# Patient Record
Sex: FEMALE | Race: BLACK OR AFRICAN AMERICAN | NOT HISPANIC OR LATINO
[De-identification: names, ages, dates, MRNs, and addresses within clinical notes are randomized per-mention and may not be internally consistent; named-entity substitution may affect disease eponyms.]

---

## 2017-06-22 ENCOUNTER — APPOINTMENT (OUTPATIENT)
Dept: UROLOGY | Facility: CLINIC | Age: 47
End: 2017-06-22

## 2017-06-22 VITALS
WEIGHT: 200 LBS | BODY MASS INDEX: 30.31 KG/M2 | HEIGHT: 68 IN | SYSTOLIC BLOOD PRESSURE: 122 MMHG | DIASTOLIC BLOOD PRESSURE: 88 MMHG | HEART RATE: 60 BPM

## 2017-06-22 DIAGNOSIS — Z78.9 OTHER SPECIFIED HEALTH STATUS: ICD-10-CM

## 2017-06-22 LAB
BILIRUB UR QL STRIP: NORMAL
CLARITY UR: CLEAR
COLLECTION METHOD: NORMAL
GLUCOSE UR-MCNC: NORMAL
HCG UR QL: 0.2 EU/DL
HGB UR QL STRIP.AUTO: NORMAL
KETONES UR-MCNC: NORMAL
LEUKOCYTE ESTERASE UR QL STRIP: NORMAL
NITRITE UR QL STRIP: NORMAL
PH UR STRIP: 7
PROT UR STRIP-MCNC: NORMAL
SP GR UR STRIP: 1.02

## 2017-06-28 ENCOUNTER — OUTPATIENT (OUTPATIENT)
Dept: OUTPATIENT SERVICES | Facility: HOSPITAL | Age: 47
LOS: 1 days | End: 2017-06-28
Payer: MEDICAID

## 2017-06-28 ENCOUNTER — APPOINTMENT (OUTPATIENT)
Dept: CT IMAGING | Facility: CLINIC | Age: 47
End: 2017-06-28

## 2017-06-28 DIAGNOSIS — Z00.8 ENCOUNTER FOR OTHER GENERAL EXAMINATION: ICD-10-CM

## 2017-06-28 PROCEDURE — 74178 CT ABD&PLV WO CNTR FLWD CNTR: CPT

## 2017-07-10 ENCOUNTER — APPOINTMENT (OUTPATIENT)
Dept: UROLOGY | Facility: CLINIC | Age: 47
End: 2017-07-10

## 2017-07-10 VITALS
HEIGHT: 68 IN | DIASTOLIC BLOOD PRESSURE: 84 MMHG | HEART RATE: 64 BPM | SYSTOLIC BLOOD PRESSURE: 131 MMHG | TEMPERATURE: 98.7 F | WEIGHT: 200 LBS | BODY MASS INDEX: 30.31 KG/M2

## 2017-07-10 DIAGNOSIS — N28.1 CYST OF KIDNEY, ACQUIRED: ICD-10-CM

## 2017-07-10 DIAGNOSIS — R10.9 UNSPECIFIED ABDOMINAL PAIN: ICD-10-CM

## 2017-08-09 ENCOUNTER — OUTPATIENT (OUTPATIENT)
Dept: OUTPATIENT SERVICES | Facility: HOSPITAL | Age: 47
LOS: 1 days | End: 2017-08-09
Payer: COMMERCIAL

## 2017-08-09 VITALS
DIASTOLIC BLOOD PRESSURE: 91 MMHG | SYSTOLIC BLOOD PRESSURE: 133 MMHG | HEIGHT: 68 IN | WEIGHT: 205.03 LBS | TEMPERATURE: 98 F | HEART RATE: 70 BPM | RESPIRATION RATE: 16 BRPM

## 2017-08-09 DIAGNOSIS — Z01.818 ENCOUNTER FOR OTHER PREPROCEDURAL EXAMINATION: ICD-10-CM

## 2017-08-09 DIAGNOSIS — J45.909 UNSPECIFIED ASTHMA, UNCOMPLICATED: ICD-10-CM

## 2017-08-09 DIAGNOSIS — D25.9 LEIOMYOMA OF UTERUS, UNSPECIFIED: ICD-10-CM

## 2017-08-09 DIAGNOSIS — D32.0 BENIGN NEOPLASM OF CEREBRAL MENINGES: Chronic | ICD-10-CM

## 2017-08-09 DIAGNOSIS — N94.89 OTHER SPECIFIED CONDITIONS ASSOCIATED WITH FEMALE GENITAL ORGANS AND MENSTRUAL CYCLE: ICD-10-CM

## 2017-08-09 DIAGNOSIS — Z98.890 OTHER SPECIFIED POSTPROCEDURAL STATES: Chronic | ICD-10-CM

## 2017-08-09 LAB
ANION GAP SERPL CALC-SCNC: 15 MMOL/L — SIGNIFICANT CHANGE UP (ref 5–17)
APTT BLD: 33.1 SEC — SIGNIFICANT CHANGE UP (ref 27.5–37.4)
BASOPHILS # BLD AUTO: 0 K/UL — SIGNIFICANT CHANGE UP (ref 0–0.2)
BASOPHILS NFR BLD AUTO: 0.8 % — SIGNIFICANT CHANGE UP (ref 0–2)
BLD GP AB SCN SERPL QL: SIGNIFICANT CHANGE UP
BUN SERPL-MCNC: 11 MG/DL — SIGNIFICANT CHANGE UP (ref 8–20)
CALCIUM SERPL-MCNC: 8.9 MG/DL — SIGNIFICANT CHANGE UP (ref 8.6–10.2)
CHLORIDE SERPL-SCNC: 103 MMOL/L — SIGNIFICANT CHANGE UP (ref 98–107)
CO2 SERPL-SCNC: 24 MMOL/L — SIGNIFICANT CHANGE UP (ref 22–29)
CREAT SERPL-MCNC: 0.74 MG/DL — SIGNIFICANT CHANGE UP (ref 0.5–1.3)
EOSINOPHIL # BLD AUTO: 0.1 K/UL — SIGNIFICANT CHANGE UP (ref 0–0.5)
EOSINOPHIL NFR BLD AUTO: 2.3 % — SIGNIFICANT CHANGE UP (ref 0–6)
GLUCOSE SERPL-MCNC: 77 MG/DL — SIGNIFICANT CHANGE UP (ref 70–115)
HCT VFR BLD CALC: 37.5 % — SIGNIFICANT CHANGE UP (ref 37–47)
HGB BLD-MCNC: 12.1 G/DL — SIGNIFICANT CHANGE UP (ref 12–16)
INR BLD: 1.03 RATIO — SIGNIFICANT CHANGE UP (ref 0.88–1.16)
LYMPHOCYTES # BLD AUTO: 1 K/UL — SIGNIFICANT CHANGE UP (ref 1–4.8)
LYMPHOCYTES # BLD AUTO: 25.3 % — SIGNIFICANT CHANGE UP (ref 20–55)
MCHC RBC-ENTMCNC: 26.1 PG — LOW (ref 27–31)
MCHC RBC-ENTMCNC: 32.3 G/DL — SIGNIFICANT CHANGE UP (ref 32–36)
MCV RBC AUTO: 80.8 FL — LOW (ref 81–99)
MONOCYTES # BLD AUTO: 0.4 K/UL — SIGNIFICANT CHANGE UP (ref 0–0.8)
MONOCYTES NFR BLD AUTO: 10 % — SIGNIFICANT CHANGE UP (ref 3–10)
NEUTROPHILS # BLD AUTO: 2.5 K/UL — SIGNIFICANT CHANGE UP (ref 1.8–8)
NEUTROPHILS NFR BLD AUTO: 61.6 % — SIGNIFICANT CHANGE UP (ref 37–73)
PLATELET # BLD AUTO: 285 K/UL — SIGNIFICANT CHANGE UP (ref 150–400)
POTASSIUM SERPL-MCNC: 3.8 MMOL/L — SIGNIFICANT CHANGE UP (ref 3.5–5.3)
POTASSIUM SERPL-SCNC: 3.8 MMOL/L — SIGNIFICANT CHANGE UP (ref 3.5–5.3)
PROTHROM AB SERPL-ACNC: 11.3 SEC — SIGNIFICANT CHANGE UP (ref 9.8–12.7)
RBC # BLD: 4.64 M/UL — SIGNIFICANT CHANGE UP (ref 4.4–5.2)
RBC # FLD: 14.4 % — SIGNIFICANT CHANGE UP (ref 11–15.6)
SODIUM SERPL-SCNC: 142 MMOL/L — SIGNIFICANT CHANGE UP (ref 135–145)
TYPE + AB SCN PNL BLD: SIGNIFICANT CHANGE UP
WBC # BLD: 4 K/UL — LOW (ref 4.8–10.8)
WBC # FLD AUTO: 4 K/UL — LOW (ref 4.8–10.8)

## 2017-08-09 PROCEDURE — G0463: CPT

## 2017-08-09 PROCEDURE — 86850 RBC ANTIBODY SCREEN: CPT

## 2017-08-09 PROCEDURE — 36415 COLL VENOUS BLD VENIPUNCTURE: CPT

## 2017-08-09 PROCEDURE — 86900 BLOOD TYPING SEROLOGIC ABO: CPT

## 2017-08-09 PROCEDURE — 85027 COMPLETE CBC AUTOMATED: CPT

## 2017-08-09 PROCEDURE — 85730 THROMBOPLASTIN TIME PARTIAL: CPT

## 2017-08-09 PROCEDURE — 86901 BLOOD TYPING SEROLOGIC RH(D): CPT

## 2017-08-09 PROCEDURE — 85610 PROTHROMBIN TIME: CPT

## 2017-08-09 PROCEDURE — 80048 BASIC METABOLIC PNL TOTAL CA: CPT

## 2017-08-09 RX ORDER — SODIUM CHLORIDE 9 MG/ML
3 INJECTION INTRAMUSCULAR; INTRAVENOUS; SUBCUTANEOUS EVERY 8 HOURS
Qty: 0 | Refills: 0 | Status: DISCONTINUED | OUTPATIENT
Start: 2017-08-22 | End: 2017-08-24

## 2017-08-09 RX ORDER — SODIUM CHLORIDE 9 MG/ML
1000 INJECTION, SOLUTION INTRAVENOUS
Qty: 0 | Refills: 0 | Status: DISCONTINUED | OUTPATIENT
Start: 2017-08-22 | End: 2017-08-24

## 2017-08-09 NOTE — H&P PST ADULT - NSANTHOSAYNRD_GEN_A_CORE
No. BEN screening performed.  STOP BANG Legend: 0-2 = LOW Risk; 3-4 = INTERMEDIATE Risk; 5-8 = HIGH Risk

## 2017-08-22 ENCOUNTER — RESULT REVIEW (OUTPATIENT)
Age: 47
End: 2017-08-22

## 2017-08-22 ENCOUNTER — TRANSCRIPTION ENCOUNTER (OUTPATIENT)
Age: 47
End: 2017-08-22

## 2017-08-22 ENCOUNTER — INPATIENT (INPATIENT)
Facility: HOSPITAL | Age: 47
LOS: 1 days | Discharge: HOME | DRG: 743 | End: 2017-08-24
Attending: OBSTETRICS & GYNECOLOGY | Admitting: OBSTETRICS & GYNECOLOGY
Payer: COMMERCIAL

## 2017-08-22 VITALS — HEIGHT: 68 IN | WEIGHT: 205.03 LBS

## 2017-08-22 DIAGNOSIS — D32.0 BENIGN NEOPLASM OF CEREBRAL MENINGES: Chronic | ICD-10-CM

## 2017-08-22 DIAGNOSIS — Z98.890 OTHER SPECIFIED POSTPROCEDURAL STATES: Chronic | ICD-10-CM

## 2017-08-22 DIAGNOSIS — D25.9 LEIOMYOMA OF UTERUS, UNSPECIFIED: ICD-10-CM

## 2017-08-22 PROCEDURE — 88307 TISSUE EXAM BY PATHOLOGIST: CPT | Mod: 26

## 2017-08-22 PROCEDURE — 88302 TISSUE EXAM BY PATHOLOGIST: CPT | Mod: 26

## 2017-08-22 RX ORDER — OXYCODONE AND ACETAMINOPHEN 5; 325 MG/1; MG/1
2 TABLET ORAL EVERY 6 HOURS
Qty: 0 | Refills: 0 | Status: DISCONTINUED | OUTPATIENT
Start: 2017-08-22 | End: 2017-08-24

## 2017-08-22 RX ORDER — MORPHINE SULFATE 50 MG/1
4 CAPSULE, EXTENDED RELEASE ORAL EVERY 4 HOURS
Qty: 0 | Refills: 0 | Status: DISCONTINUED | OUTPATIENT
Start: 2017-08-22 | End: 2017-08-24

## 2017-08-22 RX ORDER — HYDROMORPHONE HYDROCHLORIDE 2 MG/ML
0.5 INJECTION INTRAMUSCULAR; INTRAVENOUS; SUBCUTANEOUS
Qty: 0 | Refills: 0 | Status: DISCONTINUED | OUTPATIENT
Start: 2017-08-22 | End: 2017-08-22

## 2017-08-22 RX ORDER — CEFAZOLIN SODIUM 1 G
2000 VIAL (EA) INJECTION ONCE
Qty: 0 | Refills: 0 | Status: DISCONTINUED | OUTPATIENT
Start: 2017-08-22 | End: 2017-08-24

## 2017-08-22 RX ORDER — OXYCODONE AND ACETAMINOPHEN 5; 325 MG/1; MG/1
1 TABLET ORAL EVERY 4 HOURS
Qty: 0 | Refills: 0 | Status: DISCONTINUED | OUTPATIENT
Start: 2017-08-22 | End: 2017-08-24

## 2017-08-22 RX ORDER — SODIUM CHLORIDE 9 MG/ML
1000 INJECTION, SOLUTION INTRAVENOUS
Qty: 0 | Refills: 0 | Status: DISCONTINUED | OUTPATIENT
Start: 2017-08-22 | End: 2017-08-24

## 2017-08-22 RX ORDER — PREGABALIN 225 MG/1
1 CAPSULE ORAL
Qty: 0 | Refills: 0 | COMMUNITY

## 2017-08-22 RX ORDER — CEFAZOLIN SODIUM 1 G
3000 VIAL (EA) INJECTION ONCE
Qty: 0 | Refills: 0 | Status: DISCONTINUED | OUTPATIENT
Start: 2017-08-22 | End: 2017-08-22

## 2017-08-22 RX ORDER — ONDANSETRON 8 MG/1
4 TABLET, FILM COATED ORAL ONCE
Qty: 0 | Refills: 0 | Status: DISCONTINUED | OUTPATIENT
Start: 2017-08-22 | End: 2017-08-22

## 2017-08-22 RX ORDER — MORPHINE SULFATE 50 MG/1
2 CAPSULE, EXTENDED RELEASE ORAL EVERY 4 HOURS
Qty: 0 | Refills: 0 | Status: DISCONTINUED | OUTPATIENT
Start: 2017-08-22 | End: 2017-08-24

## 2017-08-22 RX ORDER — SODIUM CHLORIDE 9 MG/ML
1000 INJECTION, SOLUTION INTRAVENOUS
Qty: 0 | Refills: 0 | Status: DISCONTINUED | OUTPATIENT
Start: 2017-08-22 | End: 2017-08-22

## 2017-08-22 RX ADMIN — MORPHINE SULFATE 2 MILLIGRAM(S): 50 CAPSULE, EXTENDED RELEASE ORAL at 15:45

## 2017-08-22 RX ADMIN — SODIUM CHLORIDE 3 MILLILITER(S): 9 INJECTION INTRAMUSCULAR; INTRAVENOUS; SUBCUTANEOUS at 14:04

## 2017-08-22 RX ADMIN — SODIUM CHLORIDE 150 MILLILITER(S): 9 INJECTION, SOLUTION INTRAVENOUS at 14:05

## 2017-08-22 RX ADMIN — OXYCODONE AND ACETAMINOPHEN 2 TABLET(S): 5; 325 TABLET ORAL at 18:10

## 2017-08-22 RX ADMIN — SODIUM CHLORIDE 125 MILLILITER(S): 9 INJECTION, SOLUTION INTRAVENOUS at 15:23

## 2017-08-22 RX ADMIN — HYDROMORPHONE HYDROCHLORIDE 0.5 MILLIGRAM(S): 2 INJECTION INTRAMUSCULAR; INTRAVENOUS; SUBCUTANEOUS at 14:45

## 2017-08-22 RX ADMIN — HYDROMORPHONE HYDROCHLORIDE 0.5 MILLIGRAM(S): 2 INJECTION INTRAMUSCULAR; INTRAVENOUS; SUBCUTANEOUS at 13:45

## 2017-08-22 RX ADMIN — MORPHINE SULFATE 2 MILLIGRAM(S): 50 CAPSULE, EXTENDED RELEASE ORAL at 15:20

## 2017-08-22 RX ADMIN — HYDROMORPHONE HYDROCHLORIDE 0.5 MILLIGRAM(S): 2 INJECTION INTRAMUSCULAR; INTRAVENOUS; SUBCUTANEOUS at 14:00

## 2017-08-22 RX ADMIN — OXYCODONE AND ACETAMINOPHEN 2 TABLET(S): 5; 325 TABLET ORAL at 17:18

## 2017-08-22 RX ADMIN — HYDROMORPHONE HYDROCHLORIDE 0.5 MILLIGRAM(S): 2 INJECTION INTRAMUSCULAR; INTRAVENOUS; SUBCUTANEOUS at 15:00

## 2017-08-22 NOTE — DISCHARGE NOTE ADULT - PATIENT PORTAL LINK FT
“You can access the Follow[employer] Patient Portal, offered by [facility], by registering with the following website: [email or web address]”

## 2017-08-22 NOTE — DISCHARGE NOTE ADULT - PLAN OF CARE
pain free Please maintain regular diet and activity as tolerated. To follow up with Dr. Herrera in 2 weeks, with instruction to call if any concerns.

## 2017-08-22 NOTE — BRIEF OPERATIVE NOTE - PRE-OP DX
Intramural and submucous leiomyoma of uterus  08/22/2017  pelvic adhesions  Active  Bandar Herrera  Pelvic pain in female  08/22/2017    Active  Bandar Herrera

## 2017-08-22 NOTE — DISCHARGE NOTE ADULT - HOSPITAL COURSE
Patient is a 46 yo female s/p total abdominal hysterectomy without complications. Postoperative course was unremarkable. Upon discharge, patient is voiding, tolerating PO, ambulating, and passing gas. She is in stable condition.

## 2017-08-22 NOTE — DISCHARGE NOTE ADULT - NS AS ACTIVITY OBS
Showering allowed/Do not make important decisions/Walking-Indoors allowed/Bathing allowed/Walking-Outdoors allowed/Stairs allowed/Do not drive or operate machinery

## 2017-08-22 NOTE — BRIEF OPERATIVE NOTE - PROCEDURE
Hysterectomy, abdominal, total, with bilateral salpingectomy  08/22/2017  lysis of pelvic adhesions  Active  MICHELLE

## 2017-08-22 NOTE — DISCHARGE NOTE ADULT - CARE PROVIDER_API CALL
Bandar Herrera), Obstetrics and Gynecology  [address] [place] [address]  Phone: [phone]  Fax: [phone] Bandar Herrera), Obstetrics and Gynecology  [address] [place] [address]  Phone: [phone]  Fax: [phone]

## 2017-08-22 NOTE — BRIEF OPERATIVE NOTE - POST-OP DX
Intramural and submucous leiomyoma of uterus  08/22/2017  pelvic adhesions  Active  Bandar Herrera  Pelvic pain in female  08/22/2017    Active  Bandar Herrrea

## 2017-08-23 DIAGNOSIS — Z98.890 OTHER SPECIFIED POSTPROCEDURAL STATES: ICD-10-CM

## 2017-08-23 LAB
ANISOCYTOSIS BLD QL: SLIGHT — SIGNIFICANT CHANGE UP
ELLIPTOCYTES BLD QL SMEAR: SLIGHT — SIGNIFICANT CHANGE UP
HCT VFR BLD CALC: 33 % — LOW (ref 37–47)
HGB BLD-MCNC: 11.1 G/DL — LOW (ref 12–16)
LYMPHOCYTES # BLD AUTO: 6 % — LOW (ref 20–55)
MACROCYTES BLD QL: SLIGHT — SIGNIFICANT CHANGE UP
MCHC RBC-ENTMCNC: 26.3 PG — LOW (ref 27–31)
MCHC RBC-ENTMCNC: 33.6 G/DL — SIGNIFICANT CHANGE UP (ref 32–36)
MCV RBC AUTO: 78.2 FL — LOW (ref 81–99)
MICROCYTES BLD QL: SLIGHT — SIGNIFICANT CHANGE UP
MONOCYTES NFR BLD AUTO: 3 % — SIGNIFICANT CHANGE UP (ref 3–10)
NEUTROPHILS NFR BLD AUTO: 91 % — HIGH (ref 37–73)
OVALOCYTES BLD QL SMEAR: SLIGHT — SIGNIFICANT CHANGE UP
PLAT MORPH BLD: NORMAL — SIGNIFICANT CHANGE UP
PLATELET # BLD AUTO: 207 K/UL — SIGNIFICANT CHANGE UP (ref 150–400)
POIKILOCYTOSIS BLD QL AUTO: SLIGHT — SIGNIFICANT CHANGE UP
POLYCHROMASIA BLD QL SMEAR: SLIGHT — SIGNIFICANT CHANGE UP
RBC # BLD: 4.22 M/UL — LOW (ref 4.4–5.2)
RBC # FLD: 13.9 % — SIGNIFICANT CHANGE UP (ref 11–15.6)
RBC BLD AUTO: ABNORMAL
WBC # BLD: 9.4 K/UL — SIGNIFICANT CHANGE UP (ref 4.8–10.8)
WBC # FLD AUTO: 9.4 K/UL — SIGNIFICANT CHANGE UP (ref 4.8–10.8)

## 2017-08-23 RX ORDER — SIMETHICONE 80 MG/1
80 TABLET, CHEWABLE ORAL ONCE
Qty: 0 | Refills: 0 | Status: COMPLETED | OUTPATIENT
Start: 2017-08-23 | End: 2017-08-23

## 2017-08-23 RX ORDER — ERGOCALCIFEROL 1.25 MG/1
1 CAPSULE ORAL
Qty: 0 | Refills: 0 | COMMUNITY

## 2017-08-23 RX ORDER — PREGABALIN 225 MG/1
1 CAPSULE ORAL
Qty: 0 | Refills: 0 | COMMUNITY

## 2017-08-23 RX ORDER — FOLIC ACID 0.8 MG
1 TABLET ORAL
Qty: 0 | Refills: 0 | COMMUNITY

## 2017-08-23 RX ADMIN — OXYCODONE AND ACETAMINOPHEN 2 TABLET(S): 5; 325 TABLET ORAL at 14:12

## 2017-08-23 RX ADMIN — OXYCODONE AND ACETAMINOPHEN 2 TABLET(S): 5; 325 TABLET ORAL at 09:01

## 2017-08-23 RX ADMIN — OXYCODONE AND ACETAMINOPHEN 2 TABLET(S): 5; 325 TABLET ORAL at 08:15

## 2017-08-23 RX ADMIN — OXYCODONE AND ACETAMINOPHEN 2 TABLET(S): 5; 325 TABLET ORAL at 21:30

## 2017-08-23 RX ADMIN — OXYCODONE AND ACETAMINOPHEN 2 TABLET(S): 5; 325 TABLET ORAL at 20:38

## 2017-08-23 RX ADMIN — SIMETHICONE 80 MILLIGRAM(S): 80 TABLET, CHEWABLE ORAL at 20:35

## 2017-08-23 RX ADMIN — OXYCODONE AND ACETAMINOPHEN 2 TABLET(S): 5; 325 TABLET ORAL at 15:00

## 2017-08-23 RX ADMIN — OXYCODONE AND ACETAMINOPHEN 2 TABLET(S): 5; 325 TABLET ORAL at 00:17

## 2017-08-23 NOTE — PROGRESS NOTE ADULT - SUBJECTIVE AND OBJECTIVE BOX
This is a 47y woman who is s/p: DOMINGUEZ     She is now postoperative day:1    S:   The patient feels well, has no complaints   She is ambulating and tolerating a diet  +flatus/-BM    O:   Patient examined at bedside.   She generally looks and feels well    Vital Signs Last 24 Hrs  T(C): 37.2 (23 Aug 2017 04:41), Max: 37.2 (23 Aug 2017 04:41)  T(F): 99 (23 Aug 2017 04:41), Max: 99 (23 Aug 2017 04:41)  HR: 76 (23 Aug 2017 04:41) (62 - 105)  BP: 103/63 (23 Aug 2017 04:41) (99/52 - 155/89)  RR: 18 (23 Aug 2017 04:41) (9 - 20)  SpO2: 98% (22 Aug 2017 18:00) (98% - 100%)    08-22-17 @ 07:01  -  08-23-17 @ 06:55  --------------------------------------------------------  IN: 625 mL / OUT: 3300 mL / NET: -2675 mL      Lungs: Normal  Heart: Regular rate and rhythm  Abdomen: Soft, nontender, no distension  Incision is clean, dry, and intact  Pelvic: Normal vaginal spotting noted  Ext: No calf tenderness    LABS:                        11.1   9.4   )-----------( 207      ( 23 Aug 2017 00:48 )             33.0     Allergies    No Known Allergies      MEDICATIONS  (STANDING):  sodium chloride 0.9% lock flush 3 milliLiter(s) IV Push every 8 hours  lactated ringers. 1000 milliLiter(s) (150 mL/Hr) IV Continuous <Continuous>  ceFAZolin   IVPB 2000 milliGRAM(s) IV Intermittent once  lactated ringers. 1000 milliLiter(s) (125 mL/Hr) IV Continuous <Continuous>    MEDICATIONS  (PRN):  oxyCODONE    5 mG/acetaminophen 325 mG 1 Tablet(s) Oral every 4 hours PRN Moderate Pain  oxyCODONE    5 mG/acetaminophen 325 mG 2 Tablet(s) Oral every 6 hours PRN Severe Pain  morphine  - Injectable 2 milliGRAM(s) IV Push every 4 hours PRN Moderate Pain  morphine  - Injectable 4 milliGRAM(s) IV Push every 4 hours PRN Severe Pain

## 2017-08-23 NOTE — PROGRESS NOTE ADULT - ASSESSMENT
A/P:   Day: 1  -She is stable, tolerates a diet and has normal flatus and bowel movements  -Continue the current pain medication  -Encourage ambulation and regular diet  -DVT ppx: SCDs when not ambulating    She will be discharged on Day 2  according to the normal criteria.

## 2017-08-24 VITALS
TEMPERATURE: 98 F | SYSTOLIC BLOOD PRESSURE: 120 MMHG | DIASTOLIC BLOOD PRESSURE: 79 MMHG | HEART RATE: 92 BPM | RESPIRATION RATE: 18 BRPM

## 2017-08-24 LAB — SURGICAL PATHOLOGY FINAL REPORT - CH: SIGNIFICANT CHANGE UP

## 2017-08-24 PROCEDURE — 88302 TISSUE EXAM BY PATHOLOGIST: CPT

## 2017-08-24 PROCEDURE — 88307 TISSUE EXAM BY PATHOLOGIST: CPT

## 2017-08-24 PROCEDURE — 85027 COMPLETE CBC AUTOMATED: CPT

## 2017-08-24 RX ADMIN — OXYCODONE AND ACETAMINOPHEN 2 TABLET(S): 5; 325 TABLET ORAL at 10:37

## 2017-08-24 RX ADMIN — OXYCODONE AND ACETAMINOPHEN 2 TABLET(S): 5; 325 TABLET ORAL at 03:40

## 2017-08-24 RX ADMIN — OXYCODONE AND ACETAMINOPHEN 2 TABLET(S): 5; 325 TABLET ORAL at 09:54

## 2017-08-24 RX ADMIN — OXYCODONE AND ACETAMINOPHEN 2 TABLET(S): 5; 325 TABLET ORAL at 02:46

## 2017-08-24 NOTE — PROGRESS NOTE ADULT - ASSESSMENT
A/P: This is a 47y woman who is s/p: total abdominal hysterectomy   POD#2  -She is stable, tolerates a diet and has normal flatus and bowel movements  -Continue the current pain medication  -Encourage ambulation and regular diet  -DVT ppx: SCDs when not ambulating    She will be discharged today according to the normal criteria.

## 2017-12-07 ENCOUNTER — RECORD ABSTRACTING (OUTPATIENT)
Age: 47
End: 2017-12-07

## 2017-12-07 DIAGNOSIS — Z92.89 PERSONAL HISTORY OF OTHER MEDICAL TREATMENT: ICD-10-CM

## 2017-12-07 DIAGNOSIS — Z87.09 PERSONAL HISTORY OF OTHER DISEASES OF THE RESPIRATORY SYSTEM: ICD-10-CM

## 2018-01-04 ENCOUNTER — APPOINTMENT (OUTPATIENT)
Dept: OBGYN | Facility: CLINIC | Age: 48
End: 2018-01-04

## 2018-02-06 ENCOUNTER — OUTPATIENT (OUTPATIENT)
Dept: OUTPATIENT SERVICES | Facility: HOSPITAL | Age: 48
LOS: 1 days | End: 2018-02-06
Payer: COMMERCIAL

## 2018-02-06 DIAGNOSIS — Z98.890 OTHER SPECIFIED POSTPROCEDURAL STATES: Chronic | ICD-10-CM

## 2018-02-06 DIAGNOSIS — R93.1 ABNORMAL FINDINGS ON DIAGNOSTIC IMAGING OF HEART AND CORONARY CIRCULATION: ICD-10-CM

## 2018-02-06 DIAGNOSIS — D32.0 BENIGN NEOPLASM OF CEREBRAL MENINGES: Chronic | ICD-10-CM

## 2018-02-06 PROCEDURE — 93016 CV STRESS TEST SUPVJ ONLY: CPT

## 2018-02-06 PROCEDURE — 93017 CV STRESS TEST TRACING ONLY: CPT

## 2018-02-06 PROCEDURE — 93018 CV STRESS TEST I&R ONLY: CPT

## 2018-03-05 ENCOUNTER — NON-APPOINTMENT (OUTPATIENT)
Age: 48
End: 2018-03-05

## 2018-03-05 ENCOUNTER — APPOINTMENT (OUTPATIENT)
Dept: ELECTROPHYSIOLOGY | Facility: CLINIC | Age: 48
End: 2018-03-05
Payer: MEDICAID

## 2018-03-05 VITALS — SYSTOLIC BLOOD PRESSURE: 118 MMHG | WEIGHT: 193 LBS | DIASTOLIC BLOOD PRESSURE: 80 MMHG | BODY MASS INDEX: 29.35 KG/M2

## 2018-03-05 VITALS — HEART RATE: 51 BPM | OXYGEN SATURATION: 98 % | SYSTOLIC BLOOD PRESSURE: 120 MMHG | DIASTOLIC BLOOD PRESSURE: 80 MMHG

## 2018-03-05 PROCEDURE — 93000 ELECTROCARDIOGRAM COMPLETE: CPT

## 2018-03-05 PROCEDURE — 99204 OFFICE O/P NEW MOD 45 MIN: CPT | Mod: 25

## 2018-03-05 RX ORDER — FOLIC ACID 20 MG
CAPSULE ORAL
Refills: 0 | Status: DISCONTINUED | COMMUNITY

## 2018-03-05 RX ORDER — UBIDECARENONE/VIT E ACET 100MG-5
CAPSULE ORAL
Refills: 0 | Status: DISCONTINUED | COMMUNITY

## 2018-03-05 RX ORDER — B-COMPLEX WITH VITAMIN C
TABLET ORAL
Refills: 0 | Status: DISCONTINUED | COMMUNITY

## 2018-06-20 ENCOUNTER — APPOINTMENT (OUTPATIENT)
Dept: OBGYN | Facility: CLINIC | Age: 48
End: 2018-06-20
Payer: MEDICAID

## 2018-06-20 VITALS — BODY MASS INDEX: 29.25 KG/M2 | HEIGHT: 68 IN | WEIGHT: 193 LBS

## 2018-06-20 DIAGNOSIS — Z00.00 ENCOUNTER FOR GENERAL ADULT MEDICAL EXAMINATION W/OUT ABNORMAL FINDINGS: ICD-10-CM

## 2018-06-20 PROCEDURE — 99214 OFFICE O/P EST MOD 30 MIN: CPT

## 2018-06-21 LAB
C TRACH RRNA SPEC QL NAA+PROBE: NOT DETECTED
HPV HIGH+LOW RISK DNA PNL CVX: NOT DETECTED
N GONORRHOEA RRNA SPEC QL NAA+PROBE: NOT DETECTED
SOURCE TP AMPLIFICATION: NORMAL

## 2018-06-25 LAB — CYTOLOGY CVX/VAG DOC THIN PREP: NORMAL

## 2018-11-05 ENCOUNTER — APPOINTMENT (OUTPATIENT)
Dept: ELECTROPHYSIOLOGY | Facility: CLINIC | Age: 48
End: 2018-11-05

## 2018-11-26 ENCOUNTER — APPOINTMENT (OUTPATIENT)
Dept: ELECTROPHYSIOLOGY | Facility: CLINIC | Age: 48
End: 2018-11-26
Payer: COMMERCIAL

## 2018-11-26 ENCOUNTER — NON-APPOINTMENT (OUTPATIENT)
Age: 48
End: 2018-11-26

## 2018-11-26 VITALS
WEIGHT: 190 LBS | HEART RATE: 59 BPM | DIASTOLIC BLOOD PRESSURE: 68 MMHG | BODY MASS INDEX: 28.89 KG/M2 | OXYGEN SATURATION: 100 % | SYSTOLIC BLOOD PRESSURE: 107 MMHG

## 2018-11-26 PROCEDURE — 93000 ELECTROCARDIOGRAM COMPLETE: CPT

## 2018-11-26 PROCEDURE — 99214 OFFICE O/P EST MOD 30 MIN: CPT

## 2018-11-26 NOTE — PHYSICAL EXAM
[General Appearance - Well Developed] : well developed [General Appearance - In No Acute Distress] : no acute distress [Normal Conjunctiva] : the conjunctiva exhibited no abnormalities [No Oral Pallor] : no oral pallor [Normal Jugular Venous V Waves Present] : normal jugular venous V waves present [Respiration, Rhythm And Depth] : normal respiratory rhythm and effort [Auscultation Breath Sounds / Voice Sounds] : lungs were clear to auscultation bilaterally [Heart Rate And Rhythm] : heart rate and rhythm were normal [Heart Sounds] : normal S1 and S2 [Murmurs] : no murmurs present [Arterial Pulses Normal] : the arterial pulses were normal [Edema] : no peripheral edema present [Abdomen Tenderness] : non-tender [Abnormal Walk] : normal gait [Nail Clubbing] : no clubbing of the fingernails [Cyanosis, Localized] : no localized cyanosis [Impaired Insight] : insight and judgment were intact

## 2018-12-03 NOTE — H&P PST ADULT - VENOUS THROMBOEMBOLISM
"Telephone Encounter by Jah English RN at 09/10/18 02:22 PM     Author:  Jah English RN Service:  (none) Author Type:  Registered Nurse     Filed:  09/10/18 02:26 PM Encounter Date:  9/10/2018 Status:  Signed     :  Jah English RN (Registered Nurse)              Robert Friday    Patient Age: 33year old   Refill request by:[KR1.1T] Pharmacy fax[KR1.1M]  Refill to be:[KR1.1T] ePrescribed [id number] [address] [place] [id number]    Phone: [phone][KR1.2T]          Medication requested to be refilled:[KR1.1T]   Requested Prescriptions     Pending Prescriptions Disp Refills   â¢ Norethin Ace-Eth Estrad-FE 1-20 MG-MCG(24) TABS 3 Each 3     Sig: Take 1 Tab by mouth daily. [KR1.2T]           Next and Last Visit with Provider and Department  Next visit with Maria Isabel Ayala. is on No match found  Next visit with OBSTETRICS/GYNECOLOGY is on No match found   Last visit with Maria Isabel Ayala. was on 03/10/2018 at 10:00 AM in OBSTETRICS/GYN [facility]  Last visit with OBSTETRICS/GYNECOLOGY was on 03/10/2018 at 10:00 AM in OBSTETRICS/GYN [facility]      WEIGHT AND HEIGHT: As of 08/17/2018 weight is 150 lbs. (68.040 kg). Height is 5' 5""(1.651 m). BMI is 24.96 kg/(m^2) calculated from:     Height 5' 5\"" (1.651 m) as of 8/17/18     Weight 150 lb (68.04 kg) as of 8/17/18[KR1.1T]      Allergies      Allergen   Reactions   â¢ Sulfa Antibiotics       HIVES[KR1.2T]      Current outpatient prescriptions       Medication  Sig Dispense Refill   â¢ metoprolol (TOPROL-XL) 25 MG 24 hr tablet TAKE 1 TABLET BY MOUTH DAILY 90 Tab 3   â¢ Sertraline HCl (ZOLOFT) 25 MG tablet Take 1 Tab by mouth daily. 30 Tab 5   â¢ Norethin Ace-Eth Estrad-FE 1-20 MG-MCG(24) TABS Take 1 Tab by mouth daily. 3 Each 3        ROUTING:[KR1.1T] Patient's physician/staff[KR1.1M]        PCP: Marla Medellin MD         INS: Payor: [address] [place] [address]  Prakash / Plan: *No Plan* / Product Type: *No Product type* /    ADDRESS:  [facility] " [address] [facility] [address][BZ5.3U]         Revision History        User Key Date/Time User Provider Type Action    > KR1.2 09/10/18 02:26 PM Karla Vasquez RN Registered Nurse Sign     KR1.1 09/10/18 02:22 PM Karla Vasquez RN Registered Nurse     M - Manual, T - Template no

## 2018-12-06 ENCOUNTER — TRANSCRIPTION ENCOUNTER (OUTPATIENT)
Age: 48
End: 2018-12-06

## 2018-12-06 ENCOUNTER — OUTPATIENT (OUTPATIENT)
Dept: OUTPATIENT SERVICES | Facility: HOSPITAL | Age: 48
LOS: 1 days | Discharge: HOME | End: 2018-12-06
Payer: COMMERCIAL

## 2018-12-06 VITALS
TEMPERATURE: 98 F | RESPIRATION RATE: 16 BRPM | SYSTOLIC BLOOD PRESSURE: 135 MMHG | OXYGEN SATURATION: 100 % | HEART RATE: 55 BPM | DIASTOLIC BLOOD PRESSURE: 73 MMHG

## 2018-12-06 DIAGNOSIS — D32.0 BENIGN NEOPLASM OF CEREBRAL MENINGES: Chronic | ICD-10-CM

## 2018-12-06 DIAGNOSIS — Z98.890 OTHER SPECIFIED POSTPROCEDURAL STATES: Chronic | ICD-10-CM

## 2018-12-06 DIAGNOSIS — R00.2 PALPITATIONS: ICD-10-CM

## 2018-12-06 PROCEDURE — C1764: CPT

## 2018-12-06 PROCEDURE — 33282: CPT

## 2018-12-06 NOTE — DISCHARGE NOTE ADULT - MEDICATION SUMMARY - MEDICATIONS TO TAKE
I will START or STAY ON the medications listed below when I get home from the hospital:    aspirin 325 mg oral tablet  -- 1 tab(s) by mouth every other day  -- Indication: For blood clot ("thrombus") prevention    metoprolol succinate 50 mg oral tablet, extended release  -- 1 tab(s) by mouth once a day  -- Indication: For heart rate management

## 2018-12-06 NOTE — H&P PST ADULT - HISTORY OF PRESENT ILLNESS
47 y/o female w/ recurrent palpitations x 3-4 years despite beta blocker therapy; reportedly [place] [facility] [place] ([place] strips available for review). Episodes described as sudden onset and offset, often triggered by stress. She wore a holter monitor, but did not have symptoms during the monitoring period. She presents for elective ILR implant to facilitate longitudinal rhythm monitoring. Patient currently denies chest pain, shortness of breath at rest or with exertion, near/true syncope, fevers/chills, N/V/D, or other cardiac or constitutional symptoms.     Exercise stress test 2/6/18: 10 METS, no ST abnormalities, no arrhythmia seen. 98% MPHR  Echo - per verbal report: structurally normal heart w/ normal EF.

## 2018-12-06 NOTE — DISCHARGE NOTE ADULT - PATIENT PORTAL LINK FT
You can access the [facility] Patient Portal, offered by [facility], by registering with the following website: [email or web address]

## 2018-12-06 NOTE — DISCHARGE NOTE ADULT - PLAN OF CARE
optimize cardiac health Loop Recorder Incision Care:     - Do not touch the incision until it is completely healed.   - There is Dermabond (skin glue) on your incision, which will start to flake off on its own over the next 2-3 weeks. Do not pick at or peal off the Dermabond.   - Do not apply soaps, creams, lotions, ointments or powders to the incision until it is completely healed.  - You should call the doctor if you notice redness, drainage, swelling, increased tenderness, hot sensation around the  incision, bleeding or incision edges pulling apart.

## 2018-12-06 NOTE — DISCHARGE NOTE ADULT - CARE PLAN
Principal Discharge DX:	Palpitation  Goal:	optimize cardiac health  Assessment and plan of treatment:	Loop Recorder Incision Care:     - Do not touch the incision until it is completely healed.   - There is Dermabond (skin glue) on your incision, which will start to flake off on its own over the next 2-3 weeks. Do not pick at or peal off the Dermabond.   - Do not apply soaps, creams, lotions, ointments or powders to the incision until it is completely healed.  - You should call the doctor if you notice redness, drainage, swelling, increased tenderness, hot sensation around the  incision, bleeding or incision edges pulling apart.

## 2018-12-06 NOTE — DISCHARGE NOTE ADULT - HOSPITAL COURSE
47 y/o female w/ recurrent palpitations x 3-4 years despite beta blocker therapy; reportedly found to have AF at [facility] (no strips available for review). Episodes described as sudden onset and offset, often triggered by stress. She wore a holter monitor, but did not have symptoms during the monitoring period. She presented electively 12/6/18 and underwent ILR implant to facilitate longitudinal rhythm monitoring. The patient was observed per post procedure protocol, then discharged home with a plan for outpatient follow up.

## 2018-12-06 NOTE — H&P PST ADULT - ASSESSMENT
49 y/o female w/ recurrent palpitations x 3-4 years despite beta blocker therapy; [place] [facility] [place] for review). Episodes described as sudden onset and offset, often triggered by stress. She wore a holter monitor, but did not have symptoms during the monitoring period. She presents for elective ILR implant to facilitate longitudinal rhythm monitoring.    Plan:   Consent w/ Dr. Masterson.   Chest prep & abx per protocol.

## 2018-12-06 NOTE — DISCHARGE NOTE ADULT - PROVIDER TOKENS
FREE:[LAST:[Zelalem],FIRST:[Julito],PHONE:[phone]],FAX:[(   )    -],ADDRESS:[[address] [place] [address]]]

## 2018-12-06 NOTE — DISCHARGE NOTE ADULT - ADDITIONAL INSTRUCTIONS
Follow up with Dr. Masterson in 2-3 weeks. Our office will call you in 3-5 days to schedule an appointment. Please call [phone] with questions or concerns.

## 2018-12-19 ENCOUNTER — APPOINTMENT (OUTPATIENT)
Dept: ELECTROPHYSIOLOGY | Facility: CLINIC | Age: 48
End: 2018-12-19
Payer: COMMERCIAL

## 2018-12-19 VITALS
SYSTOLIC BLOOD PRESSURE: 93 MMHG | HEART RATE: 66 BPM | OXYGEN SATURATION: 100 % | DIASTOLIC BLOOD PRESSURE: 55 MMHG | HEIGHT: 68 IN

## 2018-12-19 VITALS — DIASTOLIC BLOOD PRESSURE: 64 MMHG | SYSTOLIC BLOOD PRESSURE: 100 MMHG

## 2018-12-19 PROCEDURE — 99024 POSTOP FOLLOW-UP VISIT: CPT

## 2018-12-19 NOTE — PHYSICAL EXAM
[Clean] : clean [Dry] : dry [Healing Well] : healing well [Bleeding] : no active bleeding [Foul Odor] : no foul smell [Purulent Drainage] : no purulent drainage [Serosanguineous Drainage] : no serosanquineous drainage [Serous Drainage] : no serous drainage [Erythema] : not erythematous [Warm] : not warm [Tender] : not tender [Indurated] : not indurated [Fluctuant] : not fluctuant [FreeTextEntry1] : parasternal

## 2018-12-19 NOTE — PROCEDURE
[Medtronic] : Medtronic [Normal] : The battery status is normal. [None] : none [de-identified] : Reveal Linq [id number] [de-identified] : [id number] [de-identified] : 12/6/2018 [de-identified] : NO AT/AF tachy/rosa pause episodes\par 8 symptom episodes - episode #13 EGM c/w NSR with 4 beat WCT to 206bpm\par episode #12 c/w NSR with 5 beat NCT\par

## 2018-12-19 NOTE — DISCUSSION/SUMMARY
[FreeTextEntry1] : 47 y/o female w/ recurrent palpitations x 3-4 years despite beta blocker therapy; [place] [facility] [place] for [place]). Episodes described as sudden onset and offset, often triggered by stress. She wore a holter monitor, but did not have symptoms during the monitoring period. She presents s/p ILR implant to facilitate longitudinal rhythm monitoring. \par \par EST 2/7/18 Normal  no ST abnormalities, no arrhythmia seen. 98% MPHR\par Parasternal implant site clean dry and healing well.\par Patient with c/o brief intermittent palpations - utilizing patient symptom button. \par ILR interrogation NO AT/AF tachy/rosa pause episodes\par 8 symptom episodes - episode #13 EGM c/w NSR with 4 beat WCT to 206bpm\par episode #12 c/w NSR with 5 beat NCT\par Will continue close ILR remote monitoring for prolonged events.\par EP follow up in 6 months or sooner if needed.\par \par Sharona Matos ANP-C

## 2018-12-19 NOTE — HISTORY OF PRESENT ILLNESS
[de-identified] : 47 y/o female w/ recurrent palpitations x 3-4 years despite beta blocker therapy; [place] [facility] [place] for [place]). Episodes described as sudden onset and offset, often triggered by stress. She wore a holter monitor, but did not have symptoms during the monitoring period. She presents s/p ILR implant to facilitate longitudinal rhythm monitoring.

## 2018-12-21 ENCOUNTER — APPOINTMENT (OUTPATIENT)
Dept: ELECTROPHYSIOLOGY | Facility: CLINIC | Age: 48
End: 2018-12-21

## 2019-01-01 NOTE — DISCUSSION/SUMMARY
[FreeTextEntry1] : She has recurrent Rapid Heart beat episodes - ? AF\par Holter monitor was unrevealing\par Recommend ILR. \par Continue on Beta blocker and ASA\par

## 2019-01-01 NOTE — HISTORY OF PRESENT ILLNESS
[FreeTextEntry1] : She had a recent rapid HB episode associated with lightheadedness - lasted about 25 mins. - when to ER via EMS - got better by time she was in ER. \par Had event monitor August 2018 - don’t have monitor. \par 46 yo woman seen for palpitations. She had prior  episode Jan 2018 - lasted a few hours - went to [place] ER - noted to be in AF. She was treated IV medications - went back to normal the next day. It has not recurred since then. She had a similar episode about a year ago but did not go to the hospital. She gets episodes on the average every 5 months and last about 10-15 mins. She denies any associated dizziness, chest pain, SOB. No syncope or presyncope. Stress seems to trigger the episodes. She under tremendous amount of stress from a custody lantigua. Denies alcohol or caffeine intake. Nonsmoker. \par History of asthma - not requiring treatment\par No HTN or DM\par ? Thyroid - hypo\par No sleep issues\par She is on metoprolol 25 mg qd and ASA.\par Evaluation:\par Exercise stress test 2/6/18: 10 METS, no ST abnormalities, no arrhythmia seen. 98% MPHR\par Echo done - verbal report : nl\par \par

## 2019-01-07 ENCOUNTER — APPOINTMENT (OUTPATIENT)
Dept: ELECTROPHYSIOLOGY | Facility: CLINIC | Age: 49
End: 2019-01-07
Payer: COMMERCIAL

## 2019-01-07 PROCEDURE — 93299: CPT

## 2019-01-07 PROCEDURE — 93298 REM INTERROG DEV EVAL SCRMS: CPT

## 2019-01-16 ENCOUNTER — APPOINTMENT (OUTPATIENT)
Dept: ELECTROPHYSIOLOGY | Facility: CLINIC | Age: 49
End: 2019-01-16
Payer: COMMERCIAL

## 2019-01-16 VITALS
HEART RATE: 56 BPM | HEIGHT: 68 IN | DIASTOLIC BLOOD PRESSURE: 66 MMHG | WEIGHT: 185 LBS | BODY MASS INDEX: 28.04 KG/M2 | OXYGEN SATURATION: 99 % | SYSTOLIC BLOOD PRESSURE: 111 MMHG

## 2019-01-16 PROCEDURE — 99214 OFFICE O/P EST MOD 30 MIN: CPT | Mod: 25

## 2019-01-16 PROCEDURE — 93000 ELECTROCARDIOGRAM COMPLETE: CPT

## 2019-02-03 NOTE — DISCUSSION/SUMMARY
[FreeTextEntry1] : ILR interrrogated and showed 4 beats WCT - VT and short episodes of AT\par She has nl LV and no ischemia.\par Continue on Beta blocker and ASA.\par follow up ILR - if longer episodes would consider ablation. \par She has had improvement in lifestyle - less stress - improvement in symptoms. \par

## 2019-02-03 NOTE — HISTORY OF PRESENT ILLNESS
[FreeTextEntry1] : \par 48 yo woman with prior palpitations and ? AF - s/p ILR - noted to have short episode of NCT and 4 beat WCT. Her palpitation has improved - shorter and infrequent and less symptomatic. .She denies any associated dizziness, chest pain, SOB. No syncope or presyncope. \par History of asthma - not requiring treatment\par No HTN or DM\par ? Thyroid - hypo\par No sleep issues\par She is on metoprolol 25 mg qd and ASA.\par Evaluation:\par Exercise stress test 2/6/18: 10 METS, no ST abnormalities, no arrhythmia seen. 98% MPHR\par Echo done - verbal report : nl\par \par

## 2019-02-11 ENCOUNTER — APPOINTMENT (OUTPATIENT)
Dept: ELECTROPHYSIOLOGY | Facility: CLINIC | Age: 49
End: 2019-02-11
Payer: COMMERCIAL

## 2019-02-11 PROCEDURE — 93298 REM INTERROG DEV EVAL SCRMS: CPT

## 2019-02-11 PROCEDURE — 93299: CPT

## 2019-03-18 ENCOUNTER — APPOINTMENT (OUTPATIENT)
Dept: ELECTROPHYSIOLOGY | Facility: CLINIC | Age: 49
End: 2019-03-18
Payer: COMMERCIAL

## 2019-03-18 PROCEDURE — 93298 REM INTERROG DEV EVAL SCRMS: CPT

## 2019-03-18 PROCEDURE — 93299: CPT

## 2019-04-11 NOTE — PATIENT PROFILE ADULT. - NS PRO ABUSE SCREEN SUSPICION NEGLECT YN
Latex:  Patient denies allergy to latex.  Medications reviewed with patient.  Tobacco use verified.  Allergies verified.    REFERRING MD:  Jerson Fields MD  Primary Medical Doctor: Andre Kurtz MD   DATE OF INJURY/SURGERY:  2/27/19  WORK RELATED: No  OCCUPATION: Customer Service    Patient is here for R knee DOI 2/27/19 follow up. Aspirated and injected 3/5/19 which seemed to help a little while. Symptoms have returned along with buckling. Pain is worse at night. He is taking OTC Ibuprofen for pain.             no

## 2019-04-19 ENCOUNTER — APPOINTMENT (OUTPATIENT)
Dept: ELECTROPHYSIOLOGY | Facility: CLINIC | Age: 49
End: 2019-04-19
Payer: COMMERCIAL

## 2019-04-19 PROCEDURE — 93299: CPT

## 2019-04-19 PROCEDURE — 93298 REM INTERROG DEV EVAL SCRMS: CPT

## 2019-05-02 NOTE — PATIENT PROFILE ADULT. - PSH
#1. Ordered MRI brain and EEG. Call office for results.  #2. Ordered neuropsychometric testing for memory.  #3. Referral placed for psychiatry given with worsening anxiety.  #4. Decrease Topamax to 50 mg nightly. Call in two weeks to get further directions.  #5. Continue Keppra 750 mg twice daily for seizure control.  #6. Continue Wellbutrin 100 mg daily for anxiety.  #7. Follow up after testing. Call with questions or concerns.  
Benign meningioma  excision 2011  H/O lumpectomy  left  1986

## 2019-05-14 ENCOUNTER — APPOINTMENT (OUTPATIENT)
Dept: ELECTROPHYSIOLOGY | Facility: CLINIC | Age: 49
End: 2019-05-14

## 2019-05-14 ENCOUNTER — APPOINTMENT (OUTPATIENT)
Dept: ELECTROPHYSIOLOGY | Facility: CLINIC | Age: 49
End: 2019-05-14
Payer: COMMERCIAL

## 2019-05-14 VITALS
OXYGEN SATURATION: 100 % | BODY MASS INDEX: 29.4 KG/M2 | WEIGHT: 194 LBS | HEIGHT: 68 IN | HEART RATE: 50 BPM | SYSTOLIC BLOOD PRESSURE: 125 MMHG | DIASTOLIC BLOOD PRESSURE: 79 MMHG

## 2019-05-14 PROCEDURE — 99214 OFFICE O/P EST MOD 30 MIN: CPT | Mod: 25

## 2019-05-14 PROCEDURE — 93000 ELECTROCARDIOGRAM COMPLETE: CPT

## 2019-05-14 NOTE — HISTORY OF PRESENT ILLNESS
[FreeTextEntry1] : beta blocker was increased to 50 mg/d\par \par `\par 48 yo woman with prior palpitations and ? AF - s/p ILR - noted to have short episode of NCT and 4 beat WCT. Her palpitation has improved - shorter and infrequent and less symptomatic. .She denies any associated dizziness, chest pain, SOB. No syncope or presyncope. \par History of asthma - not requiring treatment\par No HTN or DM\par ? Thyroid - hypo\par No sleep issues\par She is on metoprolol 25 mg qd and ASA.\par Evaluation:\par Exercise stress test 2/6/18: 10 METS, no ST abnormalities, no arrhythmia seen. 98% MPHR\par Echo done - verbal report : nl\par \par

## 2019-05-14 NOTE — PHYSICAL EXAM
[General Appearance - Well Developed] : well developed [General Appearance - In No Acute Distress] : no acute distress [Normal Conjunctiva] : the conjunctiva exhibited no abnormalities [Normal Jugular Venous V Waves Present] : normal jugular venous V waves present [No Oral Pallor] : no oral pallor [Respiration, Rhythm And Depth] : normal respiratory rhythm and effort [Heart Rate And Rhythm] : heart rate and rhythm were normal [Auscultation Breath Sounds / Voice Sounds] : lungs were clear to auscultation bilaterally [Heart Sounds] : normal S1 and S2 [Arterial Pulses Normal] : the arterial pulses were normal [Edema] : no peripheral edema present [Murmurs] : no murmurs present [Abdomen Tenderness] : non-tender [Abnormal Walk] : normal gait [Nail Clubbing] : no clubbing of the fingernails [Cyanosis, Localized] : no localized cyanosis [Impaired Insight] : insight and judgment were intact

## 2019-05-20 ENCOUNTER — APPOINTMENT (OUTPATIENT)
Dept: ELECTROPHYSIOLOGY | Facility: CLINIC | Age: 49
End: 2019-05-20
Payer: COMMERCIAL

## 2019-05-20 PROCEDURE — 93298 REM INTERROG DEV EVAL SCRMS: CPT

## 2019-05-20 PROCEDURE — 93299: CPT

## 2019-06-09 NOTE — HISTORY OF PRESENT ILLNESS
[FreeTextEntry1] : \par 48 yo woman with prior palpitations and ? AF - s/p ILR -previously noted to have short episode of NCT and 4 beat WCT - has been on metoprolol 25 mg/d. She had symptom episode on 4/28/19-  SVt 176 bpm. Beta blocker dosage was increased to 50 mg/d. She is feeling well now and has not had recurrent palpitation. She denies dizziness, chest pain, SOB. No syncope or presyncope. \par History of asthma - not requiring treatment\par No HTN or DM\par ? Thyroid - hypo\par No sleep issues\par She is on metoprolol 50 mg qd and ASA.\par Evaluation:\par Exercise stress test 2/6/18: 10 METS, no ST abnormalities, no arrhythmia seen. 98% MPHR\par Echo done - verbal report : nl\par \par

## 2019-06-09 NOTE — PHYSICAL EXAM
[Normal Conjunctiva] : the conjunctiva exhibited no abnormalities [General Appearance - In No Acute Distress] : no acute distress [General Appearance - Well Developed] : well developed [Normal Jugular Venous V Waves Present] : normal jugular venous V waves present [Respiration, Rhythm And Depth] : normal respiratory rhythm and effort [No Oral Pallor] : no oral pallor [Auscultation Breath Sounds / Voice Sounds] : lungs were clear to auscultation bilaterally [Heart Sounds] : normal S1 and S2 [Heart Rate And Rhythm] : heart rate and rhythm were normal [Murmurs] : no murmurs present [Edema] : no peripheral edema present [Arterial Pulses Normal] : the arterial pulses were normal [Nail Clubbing] : no clubbing of the fingernails [Abnormal Walk] : normal gait [Abdomen Tenderness] : non-tender [Impaired Insight] : insight and judgment were intact [Cyanosis, Localized] : no localized cyanosis

## 2019-06-19 ENCOUNTER — APPOINTMENT (OUTPATIENT)
Dept: ELECTROPHYSIOLOGY | Facility: CLINIC | Age: 49
End: 2019-06-19

## 2019-06-21 ENCOUNTER — APPOINTMENT (OUTPATIENT)
Dept: ELECTROPHYSIOLOGY | Facility: CLINIC | Age: 49
End: 2019-06-21
Payer: COMMERCIAL

## 2019-06-21 PROCEDURE — 93298 REM INTERROG DEV EVAL SCRMS: CPT

## 2019-06-21 PROCEDURE — 93299: CPT

## 2019-07-16 ENCOUNTER — APPOINTMENT (OUTPATIENT)
Dept: OBGYN | Facility: CLINIC | Age: 49
End: 2019-07-16

## 2019-07-22 ENCOUNTER — APPOINTMENT (OUTPATIENT)
Dept: ELECTROPHYSIOLOGY | Facility: CLINIC | Age: 49
End: 2019-07-22
Payer: COMMERCIAL

## 2019-07-22 PROCEDURE — 93299: CPT

## 2019-07-22 PROCEDURE — 93298 REM INTERROG DEV EVAL SCRMS: CPT

## 2019-07-26 ENCOUNTER — APPOINTMENT (OUTPATIENT)
Dept: OBGYN | Facility: CLINIC | Age: 49
End: 2019-07-26
Payer: COMMERCIAL

## 2019-07-26 VITALS
DIASTOLIC BLOOD PRESSURE: 92 MMHG | SYSTOLIC BLOOD PRESSURE: 147 MMHG | HEART RATE: 65 BPM | HEIGHT: 68 IN | WEIGHT: 195.13 LBS | BODY MASS INDEX: 29.57 KG/M2

## 2019-07-26 DIAGNOSIS — N39.3 STRESS INCONTINENCE (FEMALE) (MALE): ICD-10-CM

## 2019-07-26 PROCEDURE — 99214 OFFICE O/P EST MOD 30 MIN: CPT

## 2019-07-26 NOTE — REVIEW OF SYSTEMS
[Chills] : no chills [Discharge] : no discharge [Dyspnea] : no shortness of breath [Dec Hearing] : no hearing loss [Chest Pain] : no chest pain [Pelvic Pain] : no pelvic pain [Vomiting] : no vomiting [Abdominal Pain] : no abdominal pain [Arthralgias] : no arthralgias [Frequency] : no frequency [Breast Pain] : no breast pain [Headache] : no headache [Anxiety] : no anxiety [Deepening Voice] : no deepening of the voice [Easy Bleeding] : does not bleed easily [Nl] : Endocrine

## 2019-07-26 NOTE — PHYSICAL EXAM
[Awake] : awake [Alert] : alert [Acute Distress] : no acute distress [Mass] : no breast mass [Nipple Discharge] : no nipple discharge [Axillary LAD] : no axillary lymphadenopathy [Oriented x3] : oriented to person, place, and time [Tender] : non tender [Soft] : soft [Normal] : vagina [No Bleeding] : there was no active vaginal bleeding [Absent] : absent [Uterine Adnexae] : were not tender and not enlarged

## 2019-07-26 NOTE — COUNSELING
[Breast Self Exam] : breast self exam [Exercise] : exercise [Vitamins/Supplements] : vitamins/supplements [Nutrition] : nutrition [STD (testing, results, tx)] : STD (testing, results, tx) [Safe Sexual Practices] : safe sexual practices

## 2019-08-01 LAB — CYTOLOGY CVX/VAG DOC THIN PREP: NORMAL

## 2019-08-02 ENCOUNTER — APPOINTMENT (OUTPATIENT)
Dept: ORTHOPEDIC SURGERY | Facility: CLINIC | Age: 49
End: 2019-08-02

## 2019-08-23 ENCOUNTER — APPOINTMENT (OUTPATIENT)
Dept: ELECTROPHYSIOLOGY | Facility: CLINIC | Age: 49
End: 2019-08-23

## 2019-09-10 ENCOUNTER — APPOINTMENT (OUTPATIENT)
Dept: ELECTROPHYSIOLOGY | Facility: CLINIC | Age: 49
End: 2019-09-10
Payer: COMMERCIAL

## 2019-09-10 ENCOUNTER — NON-APPOINTMENT (OUTPATIENT)
Age: 49
End: 2019-09-10

## 2019-09-10 VITALS
HEIGHT: 68 IN | OXYGEN SATURATION: 100 % | DIASTOLIC BLOOD PRESSURE: 81 MMHG | BODY MASS INDEX: 29.55 KG/M2 | SYSTOLIC BLOOD PRESSURE: 117 MMHG | HEART RATE: 52 BPM | WEIGHT: 195 LBS

## 2019-09-10 PROCEDURE — 99214 OFFICE O/P EST MOD 30 MIN: CPT

## 2019-09-10 PROCEDURE — 93000 ELECTROCARDIOGRAM COMPLETE: CPT

## 2019-09-10 NOTE — DISCUSSION/SUMMARY
[FreeTextEntry1] : ILR interrrogated. Showed episode of SVT - ? AT. \par She has nl LV and no ischemia.\par The options were discussed with the patient this would include catheter ablation procedure versus increased dose of beta-blocker versus starting a calcium channel blocker.  She has had prior PVC as well as nonsustained VT 4 beats.  I would prefer to continue on a beta-blocker for now at a higher dosage.  She will take the metoprolol 50 mg/day and have an additional dose of 25 mg nightly appears of high stress.  The patient was seen in follow-up in approximately 4 months so we can repeat evaluate her episodes and decide whether we should proceed with catheter ablation.

## 2019-09-10 NOTE — PHYSICAL EXAM
[General Appearance - Well Developed] : well developed [General Appearance - In No Acute Distress] : no acute distress [No Oral Pallor] : no oral pallor [Normal Conjunctiva] : the conjunctiva exhibited no abnormalities [Respiration, Rhythm And Depth] : normal respiratory rhythm and effort [Normal Jugular Venous V Waves Present] : normal jugular venous V waves present [Auscultation Breath Sounds / Voice Sounds] : lungs were clear to auscultation bilaterally [Heart Rate And Rhythm] : heart rate and rhythm were normal [Murmurs] : no murmurs present [Heart Sounds] : normal S1 and S2 [Arterial Pulses Normal] : the arterial pulses were normal [Abdomen Tenderness] : non-tender [Edema] : no peripheral edema present [Abnormal Walk] : normal gait [Cyanosis, Localized] : no localized cyanosis [Nail Clubbing] : no clubbing of the fingernails [Impaired Insight] : insight and judgment were intact

## 2019-09-10 NOTE — HISTORY OF PRESENT ILLNESS
[FreeTextEntry1] : \par Patient is a 49-year-old woman who was seen in follow-up because of tachycardia episodes.  As noted previously she has had palpitations and previous recordings have shown both nonsustained supraventricular tachycardia and she had prior 4 beats of wide-complex tachycardia.  The patient has been on metoprolol 25 mg a day time she takes it twice daily.  She has been under recent increased stress.\par \par The implantable loop monitor was interrogated and it showed that she had a few episodes of nonsustained supraventricular tachycardia with rates varying between 150 to 180 bpm lasting up to 20 seconds.  The onset of this arrhythmia appears to be a premature beat t without WI prolongation.  Most likely represents an atrial tachycardia.\par \par She does not have any new symptoms other than increased stress.  She does feel palpitations during these episodes.   She denies dizziness, chest pain, SOB. No syncope or presyncope. \par History of asthma - not requiring treatment\par No HTN or DM\par ? Thyroid - hypo\par No sleep issues\par She is on metoprolol 50 mg qd and ASA.\par Evaluation:\par Exercise stress test 2/6/18: 10 METS, no ST abnormalities, no arrhythmia seen. 98% MPHR\par Echo done - verbal report : nl\par \par

## 2019-09-17 NOTE — DISCHARGE NOTE ADULT - HAS THE PATIENT RECEIVED THE INFLUENZA VACCINE THIS SEASON?
no... I have reviewed and confirmed nurses' notes for patient's medications, allergies, medical history, and surgical history.

## 2019-09-23 ENCOUNTER — APPOINTMENT (OUTPATIENT)
Dept: ELECTROPHYSIOLOGY | Facility: CLINIC | Age: 49
End: 2019-09-23
Payer: COMMERCIAL

## 2019-09-23 PROCEDURE — 93299: CPT

## 2019-09-23 PROCEDURE — 93298 REM INTERROG DEV EVAL SCRMS: CPT

## 2019-10-02 ENCOUNTER — MEDICATION RENEWAL (OUTPATIENT)
Age: 49
End: 2019-10-02

## 2019-10-03 ENCOUNTER — RX CHANGE (OUTPATIENT)
Age: 49
End: 2019-10-03

## 2019-10-25 ENCOUNTER — APPOINTMENT (OUTPATIENT)
Dept: ELECTROPHYSIOLOGY | Facility: CLINIC | Age: 49
End: 2019-10-25
Payer: COMMERCIAL

## 2019-10-25 PROCEDURE — 93298 REM INTERROG DEV EVAL SCRMS: CPT

## 2019-10-25 PROCEDURE — 93299: CPT

## 2019-11-04 ENCOUNTER — APPOINTMENT (OUTPATIENT)
Dept: UROLOGY | Facility: CLINIC | Age: 49
End: 2019-11-04
Payer: COMMERCIAL

## 2019-11-04 VITALS
BODY MASS INDEX: 29.55 KG/M2 | SYSTOLIC BLOOD PRESSURE: 117 MMHG | HEART RATE: 67 BPM | HEIGHT: 68 IN | DIASTOLIC BLOOD PRESSURE: 78 MMHG | WEIGHT: 195 LBS | RESPIRATION RATE: 15 BRPM

## 2019-11-04 PROCEDURE — 99213 OFFICE O/P EST LOW 20 MIN: CPT

## 2019-11-04 NOTE — REVIEW OF SYSTEMS
[see HPI] : see HPI [Negative] : Heme/Lymph [Feeling Tired] : feeling tired [Palpitations] : palpitations [Wheezing] : wheezing [Joint Pain] : joint pain

## 2019-11-04 NOTE — ASSESSMENT
[FreeTextEntry1] : impression:\par left renal cyst.\par It is increasing in size\par \par Plan:\par \par refer to see Dr. Gillespie for consideration of Laparascopic unroofing of cyst.  \par \par \par

## 2019-11-04 NOTE — LETTER BODY
[Dear  ___] : Dear  [unfilled], [Courtesy Letter:] : I had the pleasure of seeing your patient, [unfilled], in my office today. [Please see my note below.] : Please see my note below. [Sincerely,] : Sincerely, [FreeTextEntry3] : Ed\par \par Ifeanyi Botello MD\par [facility]\par  of Urology\par [employer] [facility]\par

## 2019-11-04 NOTE — HISTORY OF PRESENT ILLNESS
[FreeTextEntry1] : patient noted to have large cyst measuring about 9 cm. no hematuria  She feels her appetite is good but has noted easy satiety.  She has felt her abdomen is "tight.". no diarrhea or constipation.

## 2019-11-04 NOTE — PHYSICAL EXAM
[Edema] : no peripheral edema [Respiration, Rhythm And Depth] : normal respiratory rhythm and effort [Exaggerated Use Of Accessory Muscles For Inspiration] : no accessory muscle use [Abdomen Soft] : soft [Abdomen Tenderness] : non-tender [Costovertebral Angle Tenderness] : no ~M costovertebral angle tenderness [Normal Station and Gait] : the gait and station were normal for the patient's age [] : no rash [No Focal Deficits] : no focal deficits [Oriented To Time, Place, And Person] : oriented to person, place, and time [Affect] : the affect was normal [Mood] : the mood was normal [Not Anxious] : not anxious [General Appearance - Well Developed] : well developed [General Appearance - Well Nourished] : well nourished [Normal Appearance] : normal appearance [Well Groomed] : well groomed [General Appearance - In No Acute Distress] : no acute distress

## 2019-11-07 ENCOUNTER — CLINICAL ADVICE (OUTPATIENT)
Age: 49
End: 2019-11-07

## 2019-11-07 DIAGNOSIS — R31.0 GROSS HEMATURIA: ICD-10-CM

## 2019-11-11 ENCOUNTER — RESULT REVIEW (OUTPATIENT)
Age: 49
End: 2019-11-11

## 2019-11-11 LAB
APPEARANCE: CLEAR
BACTERIA UR CULT: NORMAL
BACTERIA: NEGATIVE
BILIRUBIN URINE: NEGATIVE
BLOOD URINE: NEGATIVE
COLOR: YELLOW
GLUCOSE QUALITATIVE U: NEGATIVE
HYALINE CASTS: 1 /LPF
KETONES URINE: NEGATIVE
LEUKOCYTE ESTERASE URINE: NEGATIVE
MICROSCOPIC-UA: NORMAL
NITRITE URINE: NEGATIVE
PH URINE: 6.5
PROTEIN URINE: NEGATIVE
RED BLOOD CELLS URINE: 4 /HPF
SPECIFIC GRAVITY URINE: 1.02
SQUAMOUS EPITHELIAL CELLS: 1 /HPF
UROBILINOGEN URINE: NORMAL
WHITE BLOOD CELLS URINE: 1 /HPF

## 2019-11-12 ENCOUNTER — RESULT REVIEW (OUTPATIENT)
Age: 49
End: 2019-11-12

## 2019-11-12 LAB — URINE CYTOLOGY: NORMAL

## 2019-11-14 ENCOUNTER — FORM ENCOUNTER (OUTPATIENT)
Age: 49
End: 2019-11-14

## 2019-11-15 ENCOUNTER — APPOINTMENT (OUTPATIENT)
Dept: CT IMAGING | Facility: CLINIC | Age: 49
End: 2019-11-15
Payer: COMMERCIAL

## 2019-11-15 ENCOUNTER — OUTPATIENT (OUTPATIENT)
Dept: OUTPATIENT SERVICES | Facility: HOSPITAL | Age: 49
LOS: 1 days | End: 2019-11-15
Payer: MEDICARE

## 2019-11-15 DIAGNOSIS — Z98.890 OTHER SPECIFIED POSTPROCEDURAL STATES: Chronic | ICD-10-CM

## 2019-11-15 DIAGNOSIS — D32.0 BENIGN NEOPLASM OF CEREBRAL MENINGES: Chronic | ICD-10-CM

## 2019-11-15 DIAGNOSIS — R31.0 GROSS HEMATURIA: ICD-10-CM

## 2019-11-15 PROCEDURE — 74178 CT ABD&PLV WO CNTR FLWD CNTR: CPT

## 2019-11-15 PROCEDURE — 74178 CT ABD&PLV WO CNTR FLWD CNTR: CPT | Mod: 26

## 2019-11-18 ENCOUNTER — APPOINTMENT (OUTPATIENT)
Dept: UROLOGY | Facility: CLINIC | Age: 49
End: 2019-11-18
Payer: COMMERCIAL

## 2019-11-18 VITALS — HEART RATE: 53 BPM | SYSTOLIC BLOOD PRESSURE: 123 MMHG | DIASTOLIC BLOOD PRESSURE: 57 MMHG

## 2019-11-18 DIAGNOSIS — N28.1 CYST OF KIDNEY, ACQUIRED: ICD-10-CM

## 2019-11-18 PROCEDURE — 99214 OFFICE O/P EST MOD 30 MIN: CPT

## 2019-11-24 PROBLEM — N28.1 RENAL CYST: Status: ACTIVE | Noted: 2017-06-22

## 2019-11-24 NOTE — ASSESSMENT
[FreeTextEntry1] : CT from 11/15/2019: the formal report states the cyst is suspected to be of mesenteric origin\par \par looking at the CT images the cyst looks filled with clear fluid, with no signs of infection, septae, calcification or wall thickening. the cyst touches the side of the kidney but does not change its contour and is suspected to not being of renal origin\par \par The findings described were explained to the patient.\par the patient understands there is a possibility of the cyst not being of renal origin, and even if yes, as long as it is not causing any debilitating pain - there is no reason for unroofing it\par the plan:\par 1. repeat Ct scan in 6 months to follow-up growth of the cyst\par 2. schedule a cystoscopy with Dr. Botello to complete the gross hematuria work up

## 2019-11-24 NOTE — PHYSICAL EXAM
[General Appearance - Well Developed] : well developed [General Appearance - Well Nourished] : well nourished [Normal Appearance] : normal appearance [Well Groomed] : well groomed [General Appearance - In No Acute Distress] : no acute distress [] : no respiratory distress [Exaggerated Use Of Accessory Muscles For Inspiration] : no accessory muscle use [Normal Station and Gait] : the gait and station were normal for the patient's age [Skin Color & Pigmentation] : normal skin color and pigmentation [Affect] : the affect was normal [Oriented To Time, Place, And Person] : oriented to person, place, and time [Mood] : the mood was normal [Not Anxious] : not anxious [Abdomen Tenderness] : non-tender [Abdomen Soft] : soft [Costovertebral Angle Tenderness] : no ~M costovertebral angle tenderness [Urinary Bladder Findings] : the bladder was normal on palpation

## 2019-11-24 NOTE — HISTORY OF PRESENT ILLNESS
[None] : None [FreeTextEntry1] : 49 year old healthy patient\par referred for consultation on unroofing a renal cyst that enlarged in the last CT\par \par patient descries no pain or tenderness,\par the only urologic complain is of hematuria that is now being assessed, urine cytology was negative, and awaiting a cystoscopy\par \par

## 2019-11-25 ENCOUNTER — APPOINTMENT (OUTPATIENT)
Dept: ELECTROPHYSIOLOGY | Facility: CLINIC | Age: 49
End: 2019-11-25
Payer: COMMERCIAL

## 2019-11-25 PROCEDURE — 93299: CPT

## 2019-11-25 PROCEDURE — 93298 REM INTERROG DEV EVAL SCRMS: CPT

## 2019-12-02 ENCOUNTER — APPOINTMENT (OUTPATIENT)
Dept: UROLOGY | Facility: CLINIC | Age: 49
End: 2019-12-02

## 2019-12-09 ENCOUNTER — APPOINTMENT (OUTPATIENT)
Dept: UROLOGY | Facility: CLINIC | Age: 49
End: 2019-12-09
Payer: COMMERCIAL

## 2019-12-09 VITALS
DIASTOLIC BLOOD PRESSURE: 74 MMHG | WEIGHT: 195 LBS | HEIGHT: 68 IN | SYSTOLIC BLOOD PRESSURE: 105 MMHG | HEART RATE: 63 BPM | BODY MASS INDEX: 29.55 KG/M2

## 2019-12-09 PROCEDURE — 52000 CYSTOURETHROSCOPY: CPT

## 2019-12-27 ENCOUNTER — APPOINTMENT (OUTPATIENT)
Dept: ELECTROPHYSIOLOGY | Facility: CLINIC | Age: 49
End: 2019-12-27

## 2020-01-01 NOTE — DISCHARGE NOTE ADULT - CARE PLAN
Statement Selected
Principal Discharge DX:	Postoperative state  Goal:	pain free  Instructions for follow-up, activity and diet:	Please maintain regular diet and activity as tolerated. To follow up with Dr. Herrera in 2 weeks, with instruction to call if any concerns.

## 2020-01-15 ENCOUNTER — APPOINTMENT (OUTPATIENT)
Dept: ELECTROPHYSIOLOGY | Facility: CLINIC | Age: 50
End: 2020-01-15

## 2020-01-22 ENCOUNTER — APPOINTMENT (OUTPATIENT)
Dept: ELECTROPHYSIOLOGY | Facility: CLINIC | Age: 50
End: 2020-01-22
Payer: COMMERCIAL

## 2020-01-22 PROCEDURE — G2066: CPT

## 2020-01-22 PROCEDURE — 93298 REM INTERROG DEV EVAL SCRMS: CPT

## 2020-02-26 ENCOUNTER — APPOINTMENT (OUTPATIENT)
Dept: ELECTROPHYSIOLOGY | Facility: CLINIC | Age: 50
End: 2020-02-26
Payer: COMMERCIAL

## 2020-02-26 PROCEDURE — G2066: CPT

## 2020-02-26 PROCEDURE — 93298 REM INTERROG DEV EVAL SCRMS: CPT

## 2020-04-01 ENCOUNTER — APPOINTMENT (OUTPATIENT)
Dept: ELECTROPHYSIOLOGY | Facility: CLINIC | Age: 50
End: 2020-04-01
Payer: COMMERCIAL

## 2020-04-01 PROCEDURE — 93298 REM INTERROG DEV EVAL SCRMS: CPT

## 2020-04-01 PROCEDURE — G2066: CPT

## 2020-05-06 ENCOUNTER — APPOINTMENT (OUTPATIENT)
Dept: ELECTROPHYSIOLOGY | Facility: CLINIC | Age: 50
End: 2020-05-06
Payer: COMMERCIAL

## 2020-05-06 PROCEDURE — G2066: CPT

## 2020-05-06 PROCEDURE — 93298 REM INTERROG DEV EVAL SCRMS: CPT

## 2020-05-29 ENCOUNTER — APPOINTMENT (OUTPATIENT)
Dept: ORTHOPEDIC SURGERY | Facility: CLINIC | Age: 50
End: 2020-05-29
Payer: COMMERCIAL

## 2020-05-29 VITALS
SYSTOLIC BLOOD PRESSURE: 120 MMHG | HEIGHT: 68 IN | WEIGHT: 210 LBS | BODY MASS INDEX: 31.83 KG/M2 | HEART RATE: 90 BPM | DIASTOLIC BLOOD PRESSURE: 86 MMHG

## 2020-05-29 DIAGNOSIS — M16.0 BILATERAL PRIMARY OSTEOARTHRITIS OF HIP: ICD-10-CM

## 2020-05-29 PROCEDURE — 73521 X-RAY EXAM HIPS BI 2 VIEWS: CPT

## 2020-05-29 PROCEDURE — 99205 OFFICE O/P NEW HI 60 MIN: CPT

## 2020-05-29 RX ORDER — ALBUTEROL SULFATE 90 UG/1
108 AEROSOL, METERED RESPIRATORY (INHALATION)
Refills: 0 | Status: ACTIVE | COMMUNITY

## 2020-05-29 NOTE — CONSULT LETTER
[Consult Letter:] : I had the pleasure of evaluating your patient, [unfilled]. [Dear  ___] : Dear  [unfilled], [Sincerely,] : Sincerely, [Consult Closing:] : Thank you very much for allowing me to participate in the care of this patient.  If you have any questions, please do not hesitate to contact me. [Please see my note below.] : Please see my note below. [FreeTextEntry3] : Jacinto Castaneda MD\par Chief of Joint Replacement\par Primary & Revision Hip and Knee Replacement \par [facility]\par \par  [FreeTextEntry2] : JM BELL MD\par

## 2020-05-29 NOTE — ADDENDUM
[FreeTextEntry1] : This note was authored by Martin Emanuel working as a medical scribe for Dr. Jacinto Castaneda. The note was reviewed, edited, and revised by Dr. Jacinto Castaneda whom is in agreement with the exam findings, imaging findings, and treatment plan. 05/29/2020.

## 2020-05-29 NOTE — DISCUSSION/SUMMARY
[de-identified] : The patient is a 50 year old female with moderate arthritis of the right hip and severe arthritis of the left hip. Based upon the patients continued symptoms and failure to respond to conservative treatment I have recommended a left total hip replacement for the patient. A discussion was had with the patient regarding a left total hip replacement. Anterior and posterior exposures were discussed. The patient would like to proceed with an anterior approach. A long discussion was had with the patient as what the total joint replacement would entail. A model was used to demonstrate the operation and to discuss bearing surfaces of the implants. The hospitalization and rehabilitation were discussed. The use of perioperative antibiotics and DVT prophylaxis were discussed. The risks, benefits and alternatives to surgical intervention were discussed at length with the patient. Specific risks discussed included: infection, wound breakdown, numbness and damage to nerves, tendon, muscle, arteries or other blood vessels, and the possibility of leg length discrepancy. The possibility of recurrent pain, no improvement in pain and actual worsening of the pain were also mentioned in conversation with the patient. Medical complications related to the patient's general medical health including deep vein thrombosis, pulmonary embolus, heart attack, stroke, death and other complications from anesthesia were discussed as well. The patient was told that we will take steps to minimize these risks by using sterile technique, antibiotics and DVT prophylaxis when appropriate and following the patient postoperatively in the clinic setting to monitor progress. The benefits of surgery were discussed with the patient including the potential to improve the current clinical condition through operative intervention. Alternatives to surgical intervention include continued conservative management which may yield less than optimal results in this particular patient. All questions were answered to the satisfaction of the patient. Models were used as an educational tool. We did discuss implant choices and fixation, with shared decision making with the patient.\par \par I had a long discussion with the patient regarding increased risk of perioperative complications with joint replacement in patients with obesity.  We discussed that this may include but is not limited to wound healing complications, DVT and PE, and increased infection rate.  At this point the patient's pain is debilitating and it is significantly limiting their daily function and quality of life.  The patient acknowledges this and is willing to accept this increased risk to proceed with joint replacement surgery.\par

## 2020-05-29 NOTE — HISTORY OF PRESENT ILLNESS
[de-identified] : The patient is a 50 year old female being seen for evaluation of her bilateral hips. She denies injury, trauma, or change of activity. She reports a 2 year duration of intermittent hip pain. She reports pain has become severe over the last few months and is now debilitating. She is ambulating and transferring with pain and stiffness. She reports limping at times. She reports pain is centered in the lateral hip with radiation into the groin and down the leg anterolaterally. She has difficulty donning/doffing shoes and socks and difficulty with transitioning in and out of the car. Pain is worse with transferring and weightbearing activities, most especially stair climbing. She reports using anti-inflammatories with mild relief of her symptoms. She reports loss of sleep due to bilateral hip pain. She reports her bilateral hip pain is severely limiting her quality of life at this time.  Left side is worse than the right.  She comes in today for evaluation of her bilateral hips and for treatment options.

## 2020-05-29 NOTE — PHYSICAL EXAM
[de-identified] : The patient appears well nourished  and in no apparent distress.  The patient is alert and oriented to person, place, and time.   Affect and mood appear normal. The head is normocephalic and atraumatic.  The eyes reveal normal sclera and extra ocular muscles are intact. The tongue is midline with no apparent lesions.  Skin shows normal turgor with no evidence of eczema or psoriasis.  No respiratory distress noted.  Sensation grossly intact.\par   [de-identified] : Exam of the left hip shows hip flexion of 90 degrees with groin pain, hip external rotation of 50 degrees, internal rotation is neutral and painful.\par Exam of the right hip shows SLR without difficulty, internal rotation of 10 degrees, hip external rotation of 40 degrees. 5/5 motor strength bilaterally distally. Sensation intact distally. Difficulty getting legs onto the exam table. [de-identified] : Xray- AP pelvis and 2 views bilateral hips shows moderate arthritis of the right hip and severe arthritis of the left hip.

## 2020-06-10 ENCOUNTER — APPOINTMENT (OUTPATIENT)
Dept: ELECTROPHYSIOLOGY | Facility: CLINIC | Age: 50
End: 2020-06-10
Payer: COMMERCIAL

## 2020-06-10 PROCEDURE — 93298 REM INTERROG DEV EVAL SCRMS: CPT

## 2020-06-10 PROCEDURE — G2066: CPT

## 2020-06-17 ENCOUNTER — APPOINTMENT (OUTPATIENT)
Dept: ELECTROPHYSIOLOGY | Facility: CLINIC | Age: 50
End: 2020-06-17
Payer: COMMERCIAL

## 2020-06-17 VITALS
SYSTOLIC BLOOD PRESSURE: 104 MMHG | HEART RATE: 54 BPM | OXYGEN SATURATION: 99 % | BODY MASS INDEX: 32.84 KG/M2 | DIASTOLIC BLOOD PRESSURE: 62 MMHG | TEMPERATURE: 98.3 F | WEIGHT: 216 LBS

## 2020-06-17 PROCEDURE — 93000 ELECTROCARDIOGRAM COMPLETE: CPT

## 2020-06-17 PROCEDURE — 99213 OFFICE O/P EST LOW 20 MIN: CPT

## 2020-06-17 NOTE — PHYSICAL EXAM
[General Appearance - In No Acute Distress] : no acute distress [Normal Conjunctiva] : the conjunctiva exhibited no abnormalities [General Appearance - Well Developed] : well developed [No Oral Pallor] : no oral pallor [Normal Jugular Venous V Waves Present] : normal jugular venous V waves present [Auscultation Breath Sounds / Voice Sounds] : lungs were clear to auscultation bilaterally [Respiration, Rhythm And Depth] : normal respiratory rhythm and effort [Heart Rate And Rhythm] : heart rate and rhythm were normal [Heart Sounds] : normal S1 and S2 [Murmurs] : no murmurs present [Abdomen Tenderness] : non-tender [Edema] : no peripheral edema present [Arterial Pulses Normal] : the arterial pulses were normal [Nail Clubbing] : no clubbing of the fingernails [Abnormal Walk] : normal gait [Impaired Insight] : insight and judgment were intact [Cyanosis, Localized] : no localized cyanosis

## 2020-06-18 ENCOUNTER — OUTPATIENT (OUTPATIENT)
Dept: OUTPATIENT SERVICES | Facility: HOSPITAL | Age: 50
LOS: 1 days | End: 2020-06-18
Payer: COMMERCIAL

## 2020-06-18 VITALS
TEMPERATURE: 99 F | SYSTOLIC BLOOD PRESSURE: 114 MMHG | WEIGHT: 216.71 LBS | HEART RATE: 56 BPM | DIASTOLIC BLOOD PRESSURE: 72 MMHG | HEIGHT: 68 IN | RESPIRATION RATE: 20 BRPM

## 2020-06-18 DIAGNOSIS — J45.909 UNSPECIFIED ASTHMA, UNCOMPLICATED: ICD-10-CM

## 2020-06-18 DIAGNOSIS — Z98.890 OTHER SPECIFIED POSTPROCEDURAL STATES: Chronic | ICD-10-CM

## 2020-06-18 DIAGNOSIS — Z29.9 ENCOUNTER FOR PROPHYLACTIC MEASURES, UNSPECIFIED: ICD-10-CM

## 2020-06-18 DIAGNOSIS — Z90.710 ACQUIRED ABSENCE OF BOTH CERVIX AND UTERUS: Chronic | ICD-10-CM

## 2020-06-18 DIAGNOSIS — Z01.818 ENCOUNTER FOR OTHER PREPROCEDURAL EXAMINATION: ICD-10-CM

## 2020-06-18 DIAGNOSIS — M19.90 UNSPECIFIED OSTEOARTHRITIS, UNSPECIFIED SITE: ICD-10-CM

## 2020-06-18 DIAGNOSIS — Z98.891 HISTORY OF UTERINE SCAR FROM PREVIOUS SURGERY: Chronic | ICD-10-CM

## 2020-06-18 DIAGNOSIS — Z98.890 OTHER SPECIFIED POSTPROCEDURAL STATES: ICD-10-CM

## 2020-06-18 DIAGNOSIS — D32.0 BENIGN NEOPLASM OF CEREBRAL MENINGES: Chronic | ICD-10-CM

## 2020-06-18 LAB
A1C WITH ESTIMATED AVERAGE GLUCOSE RESULT: 5.3 % — SIGNIFICANT CHANGE UP (ref 4–5.6)
ANION GAP SERPL CALC-SCNC: 12 MMOL/L — SIGNIFICANT CHANGE UP (ref 5–17)
APTT BLD: 32.7 SEC — SIGNIFICANT CHANGE UP (ref 27.5–36.3)
BASOPHILS # BLD AUTO: 0.02 K/UL — SIGNIFICANT CHANGE UP (ref 0–0.2)
BASOPHILS NFR BLD AUTO: 0.6 % — SIGNIFICANT CHANGE UP (ref 0–2)
BLD GP AB SCN SERPL QL: SIGNIFICANT CHANGE UP
BUN SERPL-MCNC: 13 MG/DL — SIGNIFICANT CHANGE UP (ref 8–20)
CALCIUM SERPL-MCNC: 9.1 MG/DL — SIGNIFICANT CHANGE UP (ref 8.6–10.2)
CHLORIDE SERPL-SCNC: 103 MMOL/L — SIGNIFICANT CHANGE UP (ref 98–107)
CO2 SERPL-SCNC: 24 MMOL/L — SIGNIFICANT CHANGE UP (ref 22–29)
CREAT SERPL-MCNC: 0.73 MG/DL — SIGNIFICANT CHANGE UP (ref 0.5–1.3)
EOSINOPHIL # BLD AUTO: 0.06 K/UL — SIGNIFICANT CHANGE UP (ref 0–0.5)
EOSINOPHIL NFR BLD AUTO: 1.8 % — SIGNIFICANT CHANGE UP (ref 0–6)
ESTIMATED AVERAGE GLUCOSE: 105 MG/DL — SIGNIFICANT CHANGE UP (ref 68–114)
GLUCOSE SERPL-MCNC: 84 MG/DL — SIGNIFICANT CHANGE UP (ref 70–99)
HCT VFR BLD CALC: 39.3 % — SIGNIFICANT CHANGE UP (ref 34.5–45)
HGB BLD-MCNC: 12.6 G/DL — SIGNIFICANT CHANGE UP (ref 11.5–15.5)
IMM GRANULOCYTES NFR BLD AUTO: 0.3 % — SIGNIFICANT CHANGE UP (ref 0–1.5)
INR BLD: 0.99 RATIO — SIGNIFICANT CHANGE UP (ref 0.88–1.16)
LYMPHOCYTES # BLD AUTO: 0.78 K/UL — LOW (ref 1–3.3)
LYMPHOCYTES # BLD AUTO: 23.4 % — SIGNIFICANT CHANGE UP (ref 13–44)
MCHC RBC-ENTMCNC: 27.2 PG — SIGNIFICANT CHANGE UP (ref 27–34)
MCHC RBC-ENTMCNC: 32.1 GM/DL — SIGNIFICANT CHANGE UP (ref 32–36)
MCV RBC AUTO: 84.9 FL — SIGNIFICANT CHANGE UP (ref 80–100)
MONOCYTES # BLD AUTO: 0.36 K/UL — SIGNIFICANT CHANGE UP (ref 0–0.9)
MONOCYTES NFR BLD AUTO: 10.8 % — SIGNIFICANT CHANGE UP (ref 2–14)
MRSA PCR RESULT.: SIGNIFICANT CHANGE UP
NEUTROPHILS # BLD AUTO: 2.1 K/UL — SIGNIFICANT CHANGE UP (ref 1.8–7.4)
NEUTROPHILS NFR BLD AUTO: 63.1 % — SIGNIFICANT CHANGE UP (ref 43–77)
PLATELET # BLD AUTO: 278 K/UL — SIGNIFICANT CHANGE UP (ref 150–400)
POTASSIUM SERPL-MCNC: 4.7 MMOL/L — SIGNIFICANT CHANGE UP (ref 3.5–5.3)
POTASSIUM SERPL-SCNC: 4.7 MMOL/L — SIGNIFICANT CHANGE UP (ref 3.5–5.3)
PROTHROM AB SERPL-ACNC: 11.2 SEC — SIGNIFICANT CHANGE UP (ref 10–12.9)
RBC # BLD: 4.63 M/UL — SIGNIFICANT CHANGE UP (ref 3.8–5.2)
RBC # FLD: 13.3 % — SIGNIFICANT CHANGE UP (ref 10.3–14.5)
S AUREUS DNA NOSE QL NAA+PROBE: SIGNIFICANT CHANGE UP
SODIUM SERPL-SCNC: 138 MMOL/L — SIGNIFICANT CHANGE UP (ref 135–145)
WBC # BLD: 3.33 K/UL — LOW (ref 3.8–10.5)
WBC # FLD AUTO: 3.33 K/UL — LOW (ref 3.8–10.5)

## 2020-06-18 PROCEDURE — 93010 ELECTROCARDIOGRAM REPORT: CPT

## 2020-06-18 PROCEDURE — G0463: CPT

## 2020-06-18 PROCEDURE — 93005 ELECTROCARDIOGRAM TRACING: CPT

## 2020-06-18 RX ORDER — ASPIRIN/CALCIUM CARB/MAGNESIUM 324 MG
1 TABLET ORAL
Qty: 0 | Refills: 0 | DISCHARGE

## 2020-06-18 RX ORDER — METOPROLOL TARTRATE 50 MG
1 TABLET ORAL
Qty: 0 | Refills: 0 | DISCHARGE

## 2020-06-18 NOTE — ASU PATIENT PROFILE, ADULT - LEARNING ASSESSMENT (PATIENT) ADDITIONAL COMMENTS
Pre op teaching surgical scrub pain management instructions given to pt Covid swab to be dine July 5 Hip replacement book given to pt Pre op teaching surgical scrub pain management instructions given to pt Covid swab to be done July 5 Hip replacement book given to pt

## 2020-06-18 NOTE — H&P PST ADULT - HISTORY OF PRESENT ILLNESS
50 yr old female presents with a  2 yr history of left hip pain , getting worse. Pain with sleep and activity, ambulates without assistance. Xray done and hip replacement recommended. Takes advil occasionally for pain with little relief.

## 2020-06-18 NOTE — H&P PST ADULT - NSANTHBMIRD_ENT_A_CORE
Pt discharged home, advised patient to rest, elevate and ice extremity, to follow up with ortho if symptoms not improving, take ibuprofen/tylenol for pain. Pt agreeable.
No

## 2020-06-18 NOTE — H&P PST ADULT - NSICDXPROBLEM_GEN_ALL_CORE_FT
PROBLEM DIAGNOSES  Problem: Asthma  Assessment and Plan: medical clearance     Problem: History of loop recorder  Assessment and Plan: cardiac clearance       Problem: OA (osteoarthritis)  Assessment and Plan: left hip replacement  anterior approach with DR. Castaneda     Problem: Need for prophylactic measure  Assessment and Plan: caprini scor e7   need for prophylactic measure
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
## 2020-06-18 NOTE — H&P PST ADULT - ASSESSMENT
50 yr old female in NAD presents with c/o left hip pain and is scheduled for left hip replacement . Pt has history of anxiety , palp , poss afib and loop recorder in place .   OPIOID RISK TOOL    RAMU EACH BOX THAT APPLIES AND ADD TOTALS AT THE END    FAMILY HISTORY OF SUBSTANCE ABUSE                 FEMALE         MALE                                                Alcohol                             [  ]1 pt          [  ]3pts                                               Illegal Durgs                     [  ]2 pts        [  ]3pts                                               Rx Drugs                           [  ]4 pts        [  ]4 pts    PERSONAL HISTORY OF SUBSTANCE ABUSE                                                                                          Alcohol                             [  ]3 pts       [  ]3 pts                                               Illegal Durgs                     [  ]4 pts        [  ]4 pts                                               Rx Drugs                           [  ]5 pts        [  ]5 pts    AGE BETWEEN 16-45 YEARS                                      [  ]1 pt         [  ]1 pt    HISTORY OF PREADOLESCENT   SEXUAL ABUSE                                                             [  ]3 pts        [  ]0pts    PSYCHOLOGICAL DISEASE                     ADD, OCD, Bipolar, Schizophrenia        [  ]2 pts         [  ]2 pts                      Depression                                               [  ]1 pt           [  ]1 pt           SCORING TOTAL   (add numbers and type here)              (0***)                                     A score of 3 or lower indicated LOW risk for future opiod abuse  A score of 4 to 7 indicated moderate risk for future opiod abuse  A score of 8 or higher indicates a high risk for opiod abuse  CAPRINI VTE 2.0 SCORE [CLOT updated 2019]    AGE RELATED RISK FACTORS                                                       MOBILITY RELATED FACTORS  [X ] Age 41-60 years                                            (1 Point)                    [ ] Bed rest                                                        (1 Point)  [ ] Age: 61-74 years                                           (2 Points)                  [ ] Plaster cast                                                   (2 Points)  [ ] Age= 75 years                                              (3 Points)                    [ ] Bed bound for more than 72 hours                 (2 Points)    DISEASE RELATED RISK FACTORS                                               GENDER SPECIFIC FACTORS  [ ] Edema in the lower extremities                       (1 Point)              [ ] Pregnancy                                                     (1 Point)  [ ] Varicose veins                                               (1 Point)                     [ ] Post-partum < 6 weeks                                   (1 Point)             [ x] BMI > 25 Kg/m2                                            (1 Point)                     [ ] Hormonal therapy  or oral contraception          (1 Point)                 [ ] Sepsis (in the previous month)                        (1 Point)               [ ] History of pregnancy complications                 (1 point)  [ ] Pneumonia or serious lung disease                                               [ ] Unexplained or recurrent                     (1 Point)           (in the previous month)                               (1 Point)  [ ] Abnormal pulmonary function test                     (1 Point)                 SURGERY RELATED RISK FACTORS  [ ] Acute myocardial infarction                              (1 Point)               [ ]  Section                                             (1 Point)  [ ] Congestive heart failure (in the previous month)  (1 Point)      [ ] Minor surgery                                                  (1 Point)   [ ] Inflammatory bowel disease                             (1 Point)               [ ] Arthroscopic surgery                                        (2 Points)  [ ] Central venous access                                      (2 Points)                [ ] General surgery lasting more than 45 minutes (2 points)  [ ] Malignancy- Present or previous                   (2 Points)                [X ] Elective arthroplasty                                         (5 points)    [ ] Stroke (in the previous month)                          (5 Points)                                                                                                                                                           HEMATOLOGY RELATED FACTORS                                                 TRAUMA RELATED RISK FACTORS  [ ] Prior episodes of VTE                                     (3 Points)                [ ] Fracture of the hip, pelvis, or leg                       (5 Points)  [ ] Positive family history for VTE                         (3 Points)             [ ] Acute spinal cord injury (in the previous month)  (5 Points)  [ ] Prothrombin 09962 A                                     (3 Points)               [ ] Paralysis  (less than 1 month)                             (5 Points)  [ ] Factor V Leiden                                             (3 Points)                  [ ] Multiple Trauma within 1 month                        (5 Points)  [ ] Lupus anticoagulants                                     (3 Points)                                                           [ ] Anticardiolipin antibodies                               (3 Points)                                                       [ ] High homocysteine in the blood                      (3 Points)                                             [ ] Other congenital or acquired thrombophilia      (3 Points)                                                [ ] Heparin induced thrombocytopenia                  (3 Points)                                     Total Score [  7        ]

## 2020-06-18 NOTE — H&P PST ADULT - NSICDXPASTSURGICALHX_GEN_ALL_CORE_FT
PAST SURGICAL HISTORY:  Benign meningioma excision 2011    H/O  section X1    H/O lumpectomy left  1986 PAST SURGICAL HISTORY:  Benign meningioma excision     H/O  section X1    H/O lumpectomy left      H/O: hysterectomy

## 2020-06-23 PROBLEM — D32.9 BENIGN NEOPLASM OF MENINGES, UNSPECIFIED: Chronic | Status: ACTIVE | Noted: 2017-08-09

## 2020-06-23 PROBLEM — R00.2 PALPITATIONS: Chronic | Status: ACTIVE | Noted: 2020-06-18

## 2020-06-23 PROBLEM — F41.9 ANXIETY DISORDER, UNSPECIFIED: Chronic | Status: ACTIVE | Noted: 2020-06-18
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
## 2020-06-25 NOTE — HISTORY OF PRESENT ILLNESS
[FreeTextEntry1] : July 8th [place] - Hip Surgery needs clearance. \par 49 yo woman with prior palpitations and ? AF - s/p ILR -previously noted to have short episode of NCT and 4 beat WCT - has been on metoprolol 25 mg/d. \par She had symptomatic episode on 4/28/19-   bpm. Beta blocker dosage was increased to 50 mg/d. She recently had two similar SVT episodes to 180 - likely AT. She is feeling well now and  denies dizziness, chest pain, SOB. No syncope or presyncope. \par History of asthma - not requiring treatment\par No HTN or DM\par ? Thyroid - hypo\par No sleep issues\par She is on metoprolol 50 mg qd and ASA.\par Evaluation:\par Exercise stress test 2/6/18: 10 METS, no ST abnormalities, no arrhythmia seen. 98% MPHR\par Echo done - verbal report : nl\par \par

## 2020-07-04 DIAGNOSIS — Z01.818 ENCOUNTER FOR OTHER PREPROCEDURAL EXAMINATION: ICD-10-CM

## 2020-07-05 ENCOUNTER — APPOINTMENT (OUTPATIENT)
Dept: DISASTER EMERGENCY | Facility: CLINIC | Age: 50
End: 2020-07-05

## 2020-07-06 LAB — SARS-COV-2 N GENE NPH QL NAA+PROBE: NOT DETECTED

## 2020-07-07 ENCOUNTER — TRANSCRIPTION ENCOUNTER (OUTPATIENT)
Age: 50
End: 2020-07-07

## 2020-07-08 ENCOUNTER — TRANSCRIPTION ENCOUNTER (OUTPATIENT)
Age: 50
End: 2020-07-08

## 2020-07-08 ENCOUNTER — APPOINTMENT (OUTPATIENT)
Dept: ORTHOPEDIC SURGERY | Facility: HOSPITAL | Age: 50
End: 2020-07-08

## 2020-07-08 ENCOUNTER — INPATIENT (INPATIENT)
Facility: HOSPITAL | Age: 50
LOS: 0 days | Discharge: HOME | DRG: 470 | End: 2020-07-09
Attending: ORTHOPAEDIC SURGERY | Admitting: ORTHOPAEDIC SURGERY
Payer: COMMERCIAL

## 2020-07-08 VITALS
TEMPERATURE: 98 F | HEART RATE: 64 BPM | OXYGEN SATURATION: 100 % | HEIGHT: 68 IN | WEIGHT: 216.71 LBS | DIASTOLIC BLOOD PRESSURE: 66 MMHG | SYSTOLIC BLOOD PRESSURE: 112 MMHG | RESPIRATION RATE: 16 BRPM

## 2020-07-08 DIAGNOSIS — D32.0 BENIGN NEOPLASM OF CEREBRAL MENINGES: Chronic | ICD-10-CM

## 2020-07-08 DIAGNOSIS — M16.12 UNILATERAL PRIMARY OSTEOARTHRITIS, LEFT HIP: ICD-10-CM

## 2020-07-08 DIAGNOSIS — Z98.890 OTHER SPECIFIED POSTPROCEDURAL STATES: Chronic | ICD-10-CM

## 2020-07-08 DIAGNOSIS — Z98.891 HISTORY OF UTERINE SCAR FROM PREVIOUS SURGERY: Chronic | ICD-10-CM

## 2020-07-08 DIAGNOSIS — Z90.710 ACQUIRED ABSENCE OF BOTH CERVIX AND UTERUS: Chronic | ICD-10-CM

## 2020-07-08 LAB
GLUCOSE BLDC GLUCOMTR-MCNC: 77 MG/DL — SIGNIFICANT CHANGE UP (ref 70–99)
GLUCOSE BLDC GLUCOMTR-MCNC: 83 MG/DL — SIGNIFICANT CHANGE UP (ref 70–99)
GLUCOSE BLDC GLUCOMTR-MCNC: 90 MG/DL — SIGNIFICANT CHANGE UP (ref 70–99)
HCT VFR BLD CALC: 32.4 % — LOW (ref 34.5–45)
HGB BLD-MCNC: 10.2 G/DL — LOW (ref 11.5–15.5)
MCHC RBC-ENTMCNC: 27 PG — SIGNIFICANT CHANGE UP (ref 27–34)
MCHC RBC-ENTMCNC: 31.5 GM/DL — LOW (ref 32–36)
MCV RBC AUTO: 85.7 FL — SIGNIFICANT CHANGE UP (ref 80–100)
PLATELET # BLD AUTO: 222 K/UL — SIGNIFICANT CHANGE UP (ref 150–400)
RBC # BLD: 3.78 M/UL — LOW (ref 3.8–5.2)
RBC # FLD: 13.3 % — SIGNIFICANT CHANGE UP (ref 10.3–14.5)
WBC # BLD: 5.34 K/UL — SIGNIFICANT CHANGE UP (ref 3.8–10.5)
WBC # FLD AUTO: 5.34 K/UL — SIGNIFICANT CHANGE UP (ref 3.8–10.5)

## 2020-07-08 PROCEDURE — 27130 TOTAL HIP ARTHROPLASTY: CPT | Mod: AS,LT

## 2020-07-08 PROCEDURE — 27130 TOTAL HIP ARTHROPLASTY: CPT | Mod: LT

## 2020-07-08 PROCEDURE — 73502 X-RAY EXAM HIP UNI 2-3 VIEWS: CPT | Mod: 26,LT

## 2020-07-08 RX ORDER — OXYCODONE HYDROCHLORIDE 5 MG/1
10 TABLET ORAL
Refills: 0 | Status: DISCONTINUED | OUTPATIENT
Start: 2020-07-08 | End: 2020-07-09

## 2020-07-08 RX ORDER — SODIUM CHLORIDE 9 MG/ML
1000 INJECTION, SOLUTION INTRAVENOUS
Refills: 0 | Status: DISCONTINUED | OUTPATIENT
Start: 2020-07-08 | End: 2020-07-09

## 2020-07-08 RX ORDER — ONDANSETRON 8 MG/1
4 TABLET, FILM COATED ORAL ONCE
Refills: 0 | Status: DISCONTINUED | OUTPATIENT
Start: 2020-07-08 | End: 2020-07-09

## 2020-07-08 RX ORDER — ACETAMINOPHEN 500 MG
975 TABLET ORAL ONCE
Refills: 0 | Status: COMPLETED | OUTPATIENT
Start: 2020-07-08 | End: 2020-07-08

## 2020-07-08 RX ORDER — HYDROMORPHONE HYDROCHLORIDE 2 MG/ML
0.5 INJECTION INTRAMUSCULAR; INTRAVENOUS; SUBCUTANEOUS
Refills: 0 | Status: DISCONTINUED | OUTPATIENT
Start: 2020-07-08 | End: 2020-07-09

## 2020-07-08 RX ORDER — CELECOXIB 200 MG/1
400 CAPSULE ORAL ONCE
Refills: 0 | Status: COMPLETED | OUTPATIENT
Start: 2020-07-08 | End: 2020-07-08

## 2020-07-08 RX ORDER — APREPITANT 80 MG/1
40 CAPSULE ORAL ONCE
Refills: 0 | Status: COMPLETED | OUTPATIENT
Start: 2020-07-08 | End: 2020-07-08

## 2020-07-08 RX ORDER — KETOROLAC TROMETHAMINE 30 MG/ML
15 SYRINGE (ML) INJECTION EVERY 6 HOURS
Refills: 0 | Status: COMPLETED | OUTPATIENT
Start: 2020-07-08 | End: 2020-07-09

## 2020-07-08 RX ORDER — CEFAZOLIN SODIUM 1 G
2000 VIAL (EA) INJECTION
Refills: 0 | Status: COMPLETED | OUTPATIENT
Start: 2020-07-08 | End: 2020-07-08

## 2020-07-08 RX ORDER — CEFAZOLIN SODIUM 1 G
2000 VIAL (EA) INJECTION ONCE
Refills: 0 | Status: DISCONTINUED | OUTPATIENT
Start: 2020-07-08 | End: 2020-07-08

## 2020-07-08 RX ORDER — TRANEXAMIC ACID 100 MG/ML
1000 INJECTION, SOLUTION INTRAVENOUS ONCE
Refills: 0 | Status: DISCONTINUED | OUTPATIENT
Start: 2020-07-08 | End: 2020-07-08

## 2020-07-08 RX ORDER — FENTANYL CITRATE 50 UG/ML
25 INJECTION INTRAVENOUS
Refills: 0 | Status: DISCONTINUED | OUTPATIENT
Start: 2020-07-08 | End: 2020-07-09

## 2020-07-08 RX ORDER — SODIUM CHLORIDE 9 MG/ML
500 INJECTION INTRAMUSCULAR; INTRAVENOUS; SUBCUTANEOUS ONCE
Refills: 0 | Status: COMPLETED | OUTPATIENT
Start: 2020-07-08 | End: 2020-07-08

## 2020-07-08 RX ORDER — METOPROLOL TARTRATE 50 MG
25 TABLET ORAL
Refills: 0 | Status: DISCONTINUED | OUTPATIENT
Start: 2020-07-09 | End: 2020-07-09

## 2020-07-08 RX ADMIN — SODIUM CHLORIDE 125 MILLILITER(S): 9 INJECTION, SOLUTION INTRAVENOUS at 21:41

## 2020-07-08 RX ADMIN — CELECOXIB 400 MILLIGRAM(S): 200 CAPSULE ORAL at 12:48

## 2020-07-08 RX ADMIN — OXYCODONE HYDROCHLORIDE 10 MILLIGRAM(S): 5 TABLET ORAL at 23:13

## 2020-07-08 RX ADMIN — Medication 975 MILLIGRAM(S): at 12:48

## 2020-07-08 RX ADMIN — APREPITANT 40 MILLIGRAM(S): 80 CAPSULE ORAL at 12:49

## 2020-07-08 RX ADMIN — Medication 25 MILLIGRAM(S): at 23:56

## 2020-07-08 RX ADMIN — SODIUM CHLORIDE 500 MILLILITER(S): 9 INJECTION INTRAMUSCULAR; INTRAVENOUS; SUBCUTANEOUS at 18:23

## 2020-07-08 RX ADMIN — Medication 15 MILLIGRAM(S): at 21:40

## 2020-07-08 RX ADMIN — Medication 100 MILLIGRAM(S): at 21:41

## 2020-07-08 NOTE — PROGRESS NOTE ADULT - SUBJECTIVE AND OBJECTIVE BOX
History: Patient is status post LEFT anterior total hip arthroplasty on 7/8 POD # 0  Patient is doing well and is comfortable.   The patient's pain is controlled using the prescribed pain medications.   Denies nausea, vomiting, chest pain, shortness of breath, abdominal pain or fever. No new complaints.    Vital Signs Last 24 Hrs  T(C): 36.4 (08 Jul 2020 22:00), Max: 36.8 (08 Jul 2020 12:12)  T(F): 97.5 (08 Jul 2020 22:00), Max: 98.2 (08 Jul 2020 12:12)  HR: 79 (08 Jul 2020 23:00) (54 - 554)  BP: 115/72 (08 Jul 2020 23:00) (96/52 - 125/71)  BP(mean): --  RR: 14 (08 Jul 2020 23:00) (11 - 18)  SpO2: 100% (08 Jul 2020 23:00) (100% - 100%)    Physical exam:   The right hip dressing is clean, dry and intact. No drainage or discharge.   The calf is supple nontender. Passive range of motion is acceptable to due postoperative pain. No calf tenderness.   Sensation to light touch is grossly intact distally.   Motor function distally is 5/5. No foot drop.   2+ dorsalis pedis pulse. Capillary refill is less than 2 seconds. No cyanosis.    Primary Orthopedic Assessment:  • s/p LEFT ANTERIOR total hip replacement    Plan:   • Post op CBC stable  • DVT prophylaxis as prescribed, including use of compression devices and ankle pumps  • Continue physical therapy  • Weightbearing as tolerated of the LEFT lower extremity with assistance of a walker  • Incentive spirometry encouraged  • Pain control as clinically indicated  • Anterior hip precautions reviewed with patient  • ANCEF per scip

## 2020-07-08 NOTE — DISCHARGE NOTE PROVIDER - NSDCMRMEDTOKEN_GEN_ALL_CORE_FT
Advil 200 mg oral tablet: 1 tab(s) orally every 6 hours  Aspirin Low Strength 81 mg oral tablet: orally 3 times a day  metoprolol tartrate 25 mg oral tablet: 1 tab(s) orally 2 times a day acetaminophen 325 mg oral tablet: 3 tab(s) orally every 8 hours  aspirin 325 mg oral delayed release tablet: 1 tab(s) orally 2 times a day  celecoxib 200 mg oral capsule: 1 cap(s) orally 2 times a day  metoprolol tartrate 25 mg oral tablet: 1 tab(s) orally 2 times a day  oxyCODONE 10 mg oral tablet: 1 tab(s) orally every 4 to 6 hours, As Needed -Moderate Pain (4 - 6) MDD:6   pantoprazole 40 mg oral delayed release tablet: 1 tab(s) orally once a day (before a meal)  Senna S 50 mg-8.6 mg oral tablet: 2 tab(s) orally once a day (at bedtime)

## 2020-07-08 NOTE — BRIEF OPERATIVE NOTE - NSICDXBRIEFPROCEDURE_GEN_ALL_CORE_FT
PROCEDURES:  Total replacement of left hip joint by anterior approach 08-Jul-2020 16:47:17  Yuri Horne

## 2020-07-08 NOTE — DISCHARGE NOTE PROVIDER - CARE PROVIDER_API CALL
Jacinto Castaneda  ORTHOPAEDIC SURGERY  [address]  [place] [address]  Phone: [phone]  Fax: [phone]  Follow Up Time:

## 2020-07-08 NOTE — DISCHARGE NOTE PROVIDER - NSDCFUSCHEDAPPT_GEN_ALL_CORE_FT
LIZ ELAM ; 07/28/2020 ; [facility] [address]  LIZ ELAM ; 07/30/2020 ; [facility] [address] ; 08/20/2020 ; [facility] [address] ; 09/08/2020 ; [facility]PP Cardio [address]

## 2020-07-08 NOTE — DISCHARGE NOTE PROVIDER - NSDCCPTREATMENT_GEN_ALL_CORE_FT
PRINCIPAL PROCEDURE  Procedure: Total replacement of left hip joint by anterior approach  Findings and Treatment:

## 2020-07-08 NOTE — DISCHARGE NOTE PROVIDER - HOSPITAL COURSE
The patient underwent a Left ANTERIOR TOTAL HIP REPLACEMENT on 7/8. The patient received antibiotics consistent with SCIP guidelines. The patient underwent the procedure and had no intra-operative complications. Post-operatively, the patient was seen by medicine and PT. The patient received ASPIRIN for DVTP. The patient received pain medications per orthopedic pain management pathway and the pain was appropriately controlled. Patient was instructed on anterior total hip precautions by PT. The patient did not have any post-operative medical complications. The patient was discharged in stable condition. The patient underwent a Left ANTERIOR TOTAL HIP REPLACEMENT on 7/8/20. The patient received antibiotics consistent with SCIP guidelines. The patient underwent the procedure and had no intra-operative complications. Post-operatively, the patient was seen by medicine and PT. The patient received ASPIRIN for DVTP. The patient received pain medications per orthopedic pain management pathway and the pain was appropriately controlled. Patient was instructed on anterior total hip precautions by PT. The patient did not have any post-operative medical complications. The patient was discharged in stable condition.

## 2020-07-08 NOTE — DISCHARGE NOTE PROVIDER - NSDCFUADDINST_GEN_ALL_CORE_FT
The patient will be seen in the office between 2-3 weeks for wound check.   **Your first post-operative visit has been scheduled prior to your admission. PLEASE CONTACT OFFICE TO CONFIRM THE APPOINTMENT DATE.   **  The silver based dressing is to be removed 7 days from the date of surgery (7/15).   ** CONTACT THE OFFICE IF THE FOLLOWING DEVELOP:  - the dressing becomes soiled or saturated  - you develop a fever greater that 101F  - the wound becomes red or you develop blistering around the wound  * Patient may shower after post-op day #3 (7/11).   * The patient will continue home PT consistent with anterior total hip replacement protocol and will continue to practice anterior total hip precautions for a minimum of 6 week. Transition to outpatient PT will occur at the time of the first office visit.   * The patient is FULL weight bearing.  * The patient will continue ASPIRIN for 6 weeks after surgery for blood clot prevention.  * While on aspirin, the patient will take daily omeprazole or other similar medication to protect the stomach from irritation.   * The patient will take OXYCODONE AND TYLENOL for pain control and adjust according to prescription and patient needs. Contact the office if pain increases while taking prescribed pain medications or related concerns develop.   * Celebrex will be taken twice daily for 3 weeks for pain control and prevention of excessive bone growth. Additional prescription may be requested at your office follow-up visit.  * The patient will take Senna S while taking oxycodone to prevent narcotic associated constipation.  Additionally, increase water intake (drink at least 8 glasses of water daily) and try adding fiber to the diet by eating fruits, vegetables and foods that are rich in grains. If constipation is experienced, contact the medical/primary care provider to discuss further treatment options.  * To avoid injury at home:  - continue use of rolling walker until cleared by physical therapist  - have family or friend remove all throw rug or objects in hallways that may present a trip hazard.  - if you experience any dizziness or medical concerns, call your medical doctor or  911.  * The implant may activate metal detection devices.

## 2020-07-09 ENCOUNTER — TRANSCRIPTION ENCOUNTER (OUTPATIENT)
Age: 50
End: 2020-07-09

## 2020-07-09 VITALS — TEMPERATURE: 98 F | DIASTOLIC BLOOD PRESSURE: 60 MMHG | SYSTOLIC BLOOD PRESSURE: 100 MMHG | HEART RATE: 67 BPM

## 2020-07-09 LAB
ANION GAP SERPL CALC-SCNC: 13 MMOL/L — SIGNIFICANT CHANGE UP (ref 5–17)
BUN SERPL-MCNC: 11 MG/DL — SIGNIFICANT CHANGE UP (ref 8–20)
CALCIUM SERPL-MCNC: 7.8 MG/DL — LOW (ref 8.6–10.2)
CHLORIDE SERPL-SCNC: 104 MMOL/L — SIGNIFICANT CHANGE UP (ref 98–107)
CO2 SERPL-SCNC: 21 MMOL/L — LOW (ref 22–29)
CREAT SERPL-MCNC: 0.66 MG/DL — SIGNIFICANT CHANGE UP (ref 0.5–1.3)
GLUCOSE SERPL-MCNC: 127 MG/DL — HIGH (ref 70–99)
HCT VFR BLD CALC: 30.2 % — LOW (ref 34.5–45)
HGB BLD-MCNC: 9.7 G/DL — LOW (ref 11.5–15.5)
MCHC RBC-ENTMCNC: 27.2 PG — SIGNIFICANT CHANGE UP (ref 27–34)
MCHC RBC-ENTMCNC: 32.1 GM/DL — SIGNIFICANT CHANGE UP (ref 32–36)
MCV RBC AUTO: 84.8 FL — SIGNIFICANT CHANGE UP (ref 80–100)
PLATELET # BLD AUTO: 232 K/UL — SIGNIFICANT CHANGE UP (ref 150–400)
POTASSIUM SERPL-MCNC: 4.4 MMOL/L — SIGNIFICANT CHANGE UP (ref 3.5–5.3)
POTASSIUM SERPL-SCNC: 4.4 MMOL/L — SIGNIFICANT CHANGE UP (ref 3.5–5.3)
RBC # BLD: 3.56 M/UL — LOW (ref 3.8–5.2)
RBC # FLD: 12.9 % — SIGNIFICANT CHANGE UP (ref 10.3–14.5)
SODIUM SERPL-SCNC: 138 MMOL/L — SIGNIFICANT CHANGE UP (ref 135–145)
WBC # BLD: 11.22 K/UL — HIGH (ref 3.8–10.5)
WBC # FLD AUTO: 11.22 K/UL — HIGH (ref 3.8–10.5)

## 2020-07-09 PROCEDURE — C1776: CPT

## 2020-07-09 PROCEDURE — C1713: CPT

## 2020-07-09 PROCEDURE — 76000 FLUOROSCOPY <1 HR PHYS/QHP: CPT

## 2020-07-09 PROCEDURE — 97163 PT EVAL HIGH COMPLEX 45 MIN: CPT

## 2020-07-09 PROCEDURE — 82962 GLUCOSE BLOOD TEST: CPT

## 2020-07-09 PROCEDURE — 99222 1ST HOSP IP/OBS MODERATE 55: CPT

## 2020-07-09 PROCEDURE — C1889: CPT

## 2020-07-09 PROCEDURE — 80048 BASIC METABOLIC PNL TOTAL CA: CPT

## 2020-07-09 PROCEDURE — 36415 COLL VENOUS BLD VENIPUNCTURE: CPT

## 2020-07-09 PROCEDURE — 85027 COMPLETE CBC AUTOMATED: CPT

## 2020-07-09 PROCEDURE — 73502 X-RAY EXAM HIP UNI 2-3 VIEWS: CPT

## 2020-07-09 RX ORDER — OXYCODONE HYDROCHLORIDE 5 MG/1
5 TABLET ORAL
Refills: 0 | Status: DISCONTINUED | OUTPATIENT
Start: 2020-07-09 | End: 2020-07-09

## 2020-07-09 RX ORDER — SODIUM CHLORIDE 9 MG/ML
1000 INJECTION, SOLUTION INTRAVENOUS
Refills: 0 | Status: DISCONTINUED | OUTPATIENT
Start: 2020-07-09 | End: 2020-07-09

## 2020-07-09 RX ORDER — SODIUM CHLORIDE 9 MG/ML
3 INJECTION INTRAMUSCULAR; INTRAVENOUS; SUBCUTANEOUS EVERY 8 HOURS
Refills: 0 | Status: DISCONTINUED | OUTPATIENT
Start: 2020-07-09 | End: 2020-07-09

## 2020-07-09 RX ORDER — POLYETHYLENE GLYCOL 3350 17 G/17G
17 POWDER, FOR SOLUTION ORAL DAILY
Refills: 0 | Status: DISCONTINUED | OUTPATIENT
Start: 2020-07-09 | End: 2020-07-09

## 2020-07-09 RX ORDER — HYDROMORPHONE HYDROCHLORIDE 2 MG/ML
0.5 INJECTION INTRAMUSCULAR; INTRAVENOUS; SUBCUTANEOUS
Refills: 0 | Status: DISCONTINUED | OUTPATIENT
Start: 2020-07-09 | End: 2020-07-09

## 2020-07-09 RX ORDER — PANTOPRAZOLE SODIUM 20 MG/1
1 TABLET, DELAYED RELEASE ORAL
Qty: 45 | Refills: 0
Start: 2020-07-09 | End: 2020-08-22

## 2020-07-09 RX ORDER — MAGNESIUM HYDROXIDE 400 MG/1
30 TABLET, CHEWABLE ORAL AT BEDTIME
Refills: 0 | Status: DISCONTINUED | OUTPATIENT
Start: 2020-07-09 | End: 2020-07-09

## 2020-07-09 RX ORDER — PANTOPRAZOLE SODIUM 20 MG/1
40 TABLET, DELAYED RELEASE ORAL
Refills: 0 | Status: DISCONTINUED | OUTPATIENT
Start: 2020-07-09 | End: 2020-07-09

## 2020-07-09 RX ORDER — ACETAMINOPHEN 500 MG
3 TABLET ORAL
Qty: 0 | Refills: 0 | DISCHARGE
Start: 2020-07-09

## 2020-07-09 RX ORDER — IBUPROFEN 200 MG
1 TABLET ORAL
Qty: 0 | Refills: 0 | DISCHARGE

## 2020-07-09 RX ORDER — HYDROMORPHONE HYDROCHLORIDE 2 MG/ML
4 INJECTION INTRAMUSCULAR; INTRAVENOUS; SUBCUTANEOUS
Refills: 0 | Status: DISCONTINUED | OUTPATIENT
Start: 2020-07-09 | End: 2020-07-09

## 2020-07-09 RX ORDER — CELECOXIB 200 MG/1
200 CAPSULE ORAL
Refills: 0 | Status: DISCONTINUED | OUTPATIENT
Start: 2020-07-10 | End: 2020-07-09

## 2020-07-09 RX ORDER — ACETAMINOPHEN 500 MG
975 TABLET ORAL EVERY 8 HOURS
Refills: 0 | Status: DISCONTINUED | OUTPATIENT
Start: 2020-07-09 | End: 2020-07-09

## 2020-07-09 RX ORDER — SENNOSIDES/DOCUSATE SODIUM 8.6MG-50MG
2 TABLET ORAL
Qty: 14 | Refills: 0
Start: 2020-07-09 | End: 2020-07-15

## 2020-07-09 RX ORDER — CELECOXIB 200 MG/1
1 CAPSULE ORAL
Qty: 42 | Refills: 0
Start: 2020-07-09 | End: 2020-07-29

## 2020-07-09 RX ORDER — OXYCODONE HYDROCHLORIDE 5 MG/1
1 TABLET ORAL
Qty: 42 | Refills: 0
Start: 2020-07-09 | End: 2020-07-15

## 2020-07-09 RX ORDER — ASPIRIN/CALCIUM CARB/MAGNESIUM 324 MG
1 TABLET ORAL
Qty: 90 | Refills: 0
Start: 2020-07-09 | End: 2020-08-22

## 2020-07-09 RX ORDER — ASPIRIN/CALCIUM CARB/MAGNESIUM 324 MG
0 TABLET ORAL
Qty: 0 | Refills: 0 | DISCHARGE

## 2020-07-09 RX ORDER — ASPIRIN/CALCIUM CARB/MAGNESIUM 324 MG
325 TABLET ORAL
Refills: 0 | Status: DISCONTINUED | OUTPATIENT
Start: 2020-07-09 | End: 2020-07-09

## 2020-07-09 RX ORDER — ONDANSETRON 8 MG/1
4 TABLET, FILM COATED ORAL EVERY 6 HOURS
Refills: 0 | Status: DISCONTINUED | OUTPATIENT
Start: 2020-07-09 | End: 2020-07-09

## 2020-07-09 RX ADMIN — Medication 15 MILLIGRAM(S): at 09:47

## 2020-07-09 RX ADMIN — SODIUM CHLORIDE 3 MILLILITER(S): 9 INJECTION INTRAMUSCULAR; INTRAVENOUS; SUBCUTANEOUS at 05:27

## 2020-07-09 RX ADMIN — POLYETHYLENE GLYCOL 3350 17 GRAM(S): 17 POWDER, FOR SOLUTION ORAL at 12:52

## 2020-07-09 RX ADMIN — ONDANSETRON 4 MILLIGRAM(S): 8 TABLET, FILM COATED ORAL at 07:48

## 2020-07-09 RX ADMIN — Medication 25 MILLIGRAM(S): at 05:21

## 2020-07-09 RX ADMIN — Medication 325 MILLIGRAM(S): at 05:21

## 2020-07-09 RX ADMIN — Medication 15 MILLIGRAM(S): at 05:21

## 2020-07-09 RX ADMIN — PANTOPRAZOLE SODIUM 40 MILLIGRAM(S): 20 TABLET, DELAYED RELEASE ORAL at 05:21

## 2020-07-09 RX ADMIN — Medication 975 MILLIGRAM(S): at 05:21

## 2020-07-09 RX ADMIN — OXYCODONE HYDROCHLORIDE 10 MILLIGRAM(S): 5 TABLET ORAL at 05:23

## 2020-07-09 RX ADMIN — SODIUM CHLORIDE 125 MILLILITER(S): 9 INJECTION, SOLUTION INTRAVENOUS at 05:21

## 2020-07-09 NOTE — PHYSICAL THERAPY INITIAL EVALUATION ADULT - GAIT PATTERN USED, PT EVAL
2-point gait 2-point gait/Pt functionally independent however, supervision provided 2*2 c/o nausea and need for standing rest breaks. RN aware.
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
## 2020-07-09 NOTE — PHYSICAL THERAPY INITIAL EVALUATION ADULT - LEVEL OF INDEPENDENCE: SUPINE/SIT, REHAB EVAL
supervision supervision/Pt functionally independent however, supervision provided 2*2 c/o nausea and need for standing rest breaks. RN aware.

## 2020-07-09 NOTE — PHYSICAL THERAPY INITIAL EVALUATION ADULT - ADDITIONAL COMMENTS
Pt lives in a private home with her son. 3 steps to enter with handrails, no steps inside. Pt was independent PTA without assist device. Pt owns a RW and commode.

## 2020-07-09 NOTE — DISCHARGE NOTE NURSING/CASE MANAGEMENT/SOCIAL WORK - PATIENT PORTAL LINK FT
You can access the FollowMyHealth Patient Portal offered by [facility] by registering at the following website: [email or web address]. By joining [facility]’s FollowMyHealth portal, you will also be able to view your health information using other applications (apps) compatible with our system.

## 2020-07-09 NOTE — PHYSICAL THERAPY INITIAL EVALUATION ADULT - LEVEL OF INDEPENDENCE: STAIR NEGOTIATION, REHAB EVAL
supervision Pt functionally independent however, supervision provided 2*2 c/o nausea and need for standing rest breaks. RN aware./supervision

## 2020-07-09 NOTE — CONSULT NOTE ADULT - ASSESSMENT
50yr female with PMH of Asthma, palpitations s/p loop recorder, with  chronic lt hip pain limiting daily activities with failed conservative medical therapies presented for an elective left hip replacement. He is now s/p Total Lt Hip arthroplasty POD#0. Doing well. Denies any pain or nausea.    # lt hip OA – s/p Rt JESS  #  Lt total hip replacement - Pain control   Bowel regimen   Incentive spirometry  DVT px - per ortho   PT    # acute blood loss anemia 2/2 postoperative state - HD stable  # Asthma – Mild intermittent – not in acute flare  # hx palpitations s/p loop recorder – 50yr female with PMH of Asthma, palpitations s/p loop recorder, with  chronic lt hip pain limiting daily activities with failed conservative medical therapies presented for an elective left hip replacement. He is now s/p Total Lt Hip arthroplasty POD#0. Doing well. Denies any pain or nausea.    # lt hip OA – s/p Rt JESS  #  Lt total hip replacement - Pain control   Bowel regimen   Incentive spirometry  DVT px - per ortho   PT    # acute blood loss anemia 2/2 postoperative state on chronic anemia of unknown cause - HD stable, was advised po iron pre-operatively, advised to resume once nausea resolves, moving her bowels. Wll need f/u with PMD. h/o hysterectomy, denies any bleeding, has been anemic since childhood. advised to consider hematology eval as outpatient.  # Asthma – Mild intermittent – not in acute flare  # hx palpitations s/p loop recorder – on metoprolol- resume   # post operative nausea - zofran prn     Medically stable for discharge if cleared by PT, pain is well controlled and tolerating po. No new changes to home medications for chronic medical illnesses recommended.

## 2020-07-09 NOTE — PHYSICAL THERAPY INITIAL EVALUATION ADULT - RANGE OF MOTION EXAMINATION, REHAB EVAL
bilateral lower extremity ROM was WFL (within functional limits)/LLE tested within THP/bilateral upper extremity ROM was WFL (within functional limits)

## 2020-07-09 NOTE — PHYSICAL THERAPY INITIAL EVALUATION ADULT - LEVEL OF INDEPENDENCE: SIT/STAND, REHAB EVAL
contact guard contact guard/Pt functionally independent however, supervision provided 2*2 c/o nausea and need for standing rest breaks. RN aware.

## 2020-07-09 NOTE — PHYSICAL THERAPY INITIAL EVALUATION ADULT - GENERAL OBSERVATIONS, REHAB EVAL
Pt received supine in bed + telemetry + IV + Venous Compression Boots, c/o "ok" pain, pt agreeable to PT

## 2020-07-15 ENCOUNTER — APPOINTMENT (OUTPATIENT)
Dept: ELECTROPHYSIOLOGY | Facility: CLINIC | Age: 50
End: 2020-07-15

## 2020-07-28 ENCOUNTER — APPOINTMENT (OUTPATIENT)
Dept: CARDIOLOGY | Facility: CLINIC | Age: 50
End: 2020-07-28
Payer: COMMERCIAL

## 2020-07-28 PROCEDURE — 93298 REM INTERROG DEV EVAL SCRMS: CPT

## 2020-07-28 PROCEDURE — G2066: CPT

## 2020-07-28 NOTE — ASSESSMENT
[FreeTextEntry1] : Remote Lnq Summary\par \par 7-28-20 \par Viewed lnq summary 6-19-20 to 7-11-20 (we took over loop monitor 6-19-20). \par No events were noted. \par Pt saw Rj on 6-17-20. (per note, showed episode of SVT - ? AT. She has also had prior NSVT 4 beats, she has nl LV and no ischemia. Options d/w patient: catheter ablation vs medications. Does not want ablation. Would increase beta blocker dosage further or consider CCB if recurrent episodes.), follow up ILR - if frequent episodes would reconsider ablation. \par Next summary on pa schedule 32days-cv

## 2020-07-30 ENCOUNTER — APPOINTMENT (OUTPATIENT)
Dept: ORTHOPEDIC SURGERY | Facility: CLINIC | Age: 50
End: 2020-07-30
Payer: COMMERCIAL

## 2020-07-30 VITALS — WEIGHT: 216 LBS | BODY MASS INDEX: 32.74 KG/M2 | HEIGHT: 68 IN

## 2020-07-30 PROCEDURE — 73502 X-RAY EXAM HIP UNI 2-3 VIEWS: CPT | Mod: LT

## 2020-07-30 PROCEDURE — 99024 POSTOP FOLLOW-UP VISIT: CPT

## 2020-07-30 NOTE — ADDENDUM
[FreeTextEntry1] : This note was authored by Martin Emanuel working as a medical scribe for Dr. Jacinto Castaneda. The note was reviewed, edited, and revised by Dr. Jacinto Castaneda whom is in agreement with the exam findings, imaging findings, and treatment plan. 07/30/2020.

## 2020-07-30 NOTE — HISTORY OF PRESENT ILLNESS
[Doing Well] : is doing well [Excellent Pain Control] : has excellent pain control [No Sign of Infection] : is showing no signs of infection [Erythema] : not erythematous [Discharge] : absent of discharge [Dehiscence] : not dehisced [de-identified] : Exam of the left hip shows a well healing incision. SLR with weakness, hip external rotation of 50 degrees, hip flexion to 90 degrees. 5/5 motor strength bilaterally distally. Sensation intact distally. LFCN intact.  [de-identified] : The patient is a 50 year old female 3 weeks s/p left anterior THR. She is ambulating and transferring well with a cane. For DVT prophylaxis she is on aspirin twice per day. She has completed home physical therapy at this time. She is on Celebrex for prevention of heterotopic ossification. Pain is controlled well with oxycodone and Tylenol. She denies systemic symptoms of fever or chills. She denies drainage from the incision site.  [de-identified] : S/P Left DAA THR, DOS: 7/8/20. [de-identified] : Xray- AP pelvis and 2 views of the left hip shows a hip replacement in stable position, without sign of fracture or dislocation.  [de-identified] : The patient is doing very well 3 weeks after left anterior total hip replacement. The patient will be transitioned to outpatient physical therapy and a prescription was given for that. The patient will continue aspirin 325 mg twice per day for DVT prophylaxis for the next three weeks. Anterior hip precautions were reinforced. Overall the patient is very happy with their outcome so far. Followup in 3 weeks with repeat x-rays.

## 2020-08-19 ENCOUNTER — APPOINTMENT (OUTPATIENT)
Dept: ELECTROPHYSIOLOGY | Facility: CLINIC | Age: 50
End: 2020-08-19

## 2020-08-21 ENCOUNTER — APPOINTMENT (OUTPATIENT)
Dept: ORTHOPEDIC SURGERY | Facility: CLINIC | Age: 50
End: 2020-08-21
Payer: COMMERCIAL

## 2020-08-21 VITALS — BODY MASS INDEX: 32.74 KG/M2 | WEIGHT: 216 LBS | HEIGHT: 68 IN

## 2020-08-21 PROCEDURE — 73522 X-RAY EXAM HIPS BI 3-4 VIEWS: CPT

## 2020-08-21 PROCEDURE — 99024 POSTOP FOLLOW-UP VISIT: CPT

## 2020-08-21 NOTE — HISTORY OF PRESENT ILLNESS
[Neuro Intact] : an unremarkable neurological exam [Negative Curtis's] : maneuvers demonstrated a negative Curtis's sign [Doing Well] : is doing well [Vascular Intact] : ~T peripheral vascular exam normal [No Sign of Infection] : is showing no signs of infection [Excellent Pain Control] : has excellent pain control [Erythema] : not erythematous [Discharge] : absent of discharge [Dehiscence] : not dehisced [de-identified] : The patient is a 50 year old female 6 weeks s/p left anterior THR. She is ambulating without assistive device. For DVT prophylaxis she is on aspirin twice per day. She is currently participating in out patient PT. She is on Celebrex for prevention of heterotopic ossification. Pain is controlled well with Tylenol. She denies systemic symptoms of fever or chills. She denies incisional issues.  [de-identified] : S/P Left DAA THR, DOS: 7/8/20. [de-identified] : The patient is doing very well 6 weeks after left anterior total hip replacement. Patient denies need for refill prescription of PT or Celebrex at this time.  She no longer requires aspirin 325mg BID for DVT ppx, she will go back to her regular dose aspirin as per PCP.  Anterior hip precautions were reinforced. Overall the patient is very happy with their outcome so far. Followup in 4 weeks with repeat x-rays.  [de-identified] : Xray- AP pelvis and 2 views of the left hip reveals stable left total hip replacement without sign of fracture or dislocation.  [de-identified] : Exam of the left hip shows a well healing incision. SLR with weakness, hip external rotation of 50 degrees, hip internal rotation to 15 degrees, hip flexion to 100 degrees. 5/5 motor strength bilaterally distally. Sensation intact. LFCN intact.

## 2020-09-10 ENCOUNTER — APPOINTMENT (OUTPATIENT)
Dept: CARDIOLOGY | Facility: CLINIC | Age: 50
End: 2020-09-10
Payer: COMMERCIAL

## 2020-09-10 PROCEDURE — 93298 REM INTERROG DEV EVAL SCRMS: CPT

## 2020-09-10 PROCEDURE — G2066: CPT

## 2020-09-10 NOTE — ASSESSMENT
[FreeTextEntry1] : Remote Lnq summary\par \par 9-10-20 \par Viewed lnq summary 7-11-20 to 8-15-20. \par No events were noted.\par Saw EP 6-17-20\par  Next summary on pa schedule 32d-cv

## 2020-09-17 ENCOUNTER — APPOINTMENT (OUTPATIENT)
Dept: ORTHOPEDIC SURGERY | Facility: CLINIC | Age: 50
End: 2020-09-17
Payer: COMMERCIAL

## 2020-09-17 VITALS
BODY MASS INDEX: 32.74 KG/M2 | WEIGHT: 216 LBS | SYSTOLIC BLOOD PRESSURE: 131 MMHG | HEIGHT: 68 IN | DIASTOLIC BLOOD PRESSURE: 84 MMHG | HEART RATE: 60 BPM

## 2020-09-17 PROCEDURE — 99024 POSTOP FOLLOW-UP VISIT: CPT

## 2020-09-17 PROCEDURE — 73502 X-RAY EXAM HIP UNI 2-3 VIEWS: CPT | Mod: LT

## 2020-09-17 RX ORDER — ASPIRIN 325 MG/1
325 TABLET ORAL
Qty: 90 | Refills: 0 | Status: DISCONTINUED | COMMUNITY
Start: 2020-07-09 | End: 2020-09-17

## 2020-09-17 RX ORDER — ASPIRIN 325 MG/1
325 TABLET, FILM COATED ORAL DAILY
Qty: 30 | Refills: 3 | Status: DISCONTINUED | COMMUNITY
End: 2020-09-17

## 2020-09-17 RX ORDER — OXYCODONE 10 MG/1
10 TABLET ORAL
Qty: 42 | Refills: 0 | Status: DISCONTINUED | COMMUNITY
Start: 2020-07-09 | End: 2020-09-17

## 2020-09-17 RX ORDER — HYDROCODONE BITARTRATE AND ACETAMINOPHEN 5; 325 MG/1; MG/1
5-325 TABLET ORAL
Qty: 30 | Refills: 0 | Status: DISCONTINUED | COMMUNITY
Start: 2020-03-28 | End: 2020-09-17

## 2020-09-17 RX ORDER — CELECOXIB 200 MG/1
200 CAPSULE ORAL TWICE DAILY
Qty: 42 | Refills: 0 | Status: DISCONTINUED | COMMUNITY
Start: 2020-07-30 | End: 2020-09-17

## 2020-09-17 NOTE — HISTORY OF PRESENT ILLNESS
[Doing Well] : is doing well [Excellent Pain Control] : has excellent pain control [No Sign of Infection] : is showing no signs of infection [Erythema] : not erythematous [Discharge] : absent of discharge [Dehiscence] : not dehisced [de-identified] : S/P Left DAA THR, DOS: 7/8/20.  [de-identified] : The patient is a 50 year old female 10 weeks s/p left anterior THR. She is ambulating and transferring well without assistive devices. She reports attending physical therapy 3 times weekly with good improvement of strength. She reports returning to daily activities of life without significant pain. Overall, she is very happy with the results of the surgery.  [de-identified] : Exam of the left hip shows a well healed incision. SLR without difficulty, hip flexion of 90 degrees, hip external rotation of 40 degrees. 5/5 motor strength bilaterally distally. Sensation intact distally. LFCN intact.  [de-identified] : Xray- AP pelvis and 2 views of the left hip shows a hip replacement in stable position, without sign of fracture or dislocation.  [de-identified] : The patient is a 50 year old female 10 weeks s/p left anterior THR. She will conclude physical therapy and gradually transition to home exercises. Overall, she is very happy with the results of the surgery. Dental prophylaxis was advised. Follow up one year post op for radiographic surveillance.

## 2020-09-17 NOTE — ADDENDUM
[FreeTextEntry1] : This note was authored by Martin Emanuel working as a medical scribe for Dr. Jacinto Castaneda. The note was reviewed, edited, and revised by Dr. Jacinto Castaneda whom is in agreement with the exam findings, imaging findings, and treatment plan. 09/17/2020.

## 2020-09-23 ENCOUNTER — APPOINTMENT (OUTPATIENT)
Dept: ELECTROPHYSIOLOGY | Facility: CLINIC | Age: 50
End: 2020-09-23

## 2020-10-06 ENCOUNTER — FORM ENCOUNTER (OUTPATIENT)
Age: 50
End: 2020-10-06

## 2020-10-13 ENCOUNTER — APPOINTMENT (OUTPATIENT)
Dept: CARDIOLOGY | Facility: CLINIC | Age: 50
End: 2020-10-13
Payer: COMMERCIAL

## 2020-10-13 PROCEDURE — G2066: CPT

## 2020-10-13 PROCEDURE — 93298 REM INTERROG DEV EVAL SCRMS: CPT

## 2020-10-13 NOTE — ASSESSMENT
[FreeTextEntry1] : Remote Lnq summary\par \par \par 10-13-20\par Viewed lnq summary 8-15-20 to 9-19-20. \par No events were noted. \par Seen by EP 6-17-20. Given h/o SVT-?AT, EP discussed catheter ablation vs medications. It was noted that the patient did not want ablation. He would increase beta blocker dosage further or consider CCB if recurrent episodes. It was noted that if the patient had frequent episodes he would reconsider ablation.\par Next summary on pa schedule 32d-cv

## 2020-10-20 ENCOUNTER — NON-APPOINTMENT (OUTPATIENT)
Age: 50
End: 2020-10-20

## 2020-11-23 ENCOUNTER — APPOINTMENT (OUTPATIENT)
Dept: CARDIOLOGY | Facility: CLINIC | Age: 50
End: 2020-11-23
Payer: COMMERCIAL

## 2020-11-23 PROCEDURE — 93298 REM INTERROG DEV EVAL SCRMS: CPT

## 2020-11-23 PROCEDURE — G2066: CPT

## 2020-11-23 NOTE — ASSESSMENT
[FreeTextEntry1] : Remote LNq SUmmary\par \par 11-23-20 \par Viewed lnq summary 9-19-20 to 10-24-20. \par Event labeled tachy was read as SVT x 20sec avg v [id number] on 10/19/20. \par This correlated to patient feeling, rapid HR/dizziness and had to sit down/take deep breaths.\par  This was r/w RJ, no changes, keep f/u 12/21 unless more events.\par  On BB.  \par Next summary on pa schedule 32d-cv

## 2020-11-24 ENCOUNTER — APPOINTMENT (OUTPATIENT)
Dept: ORTHOPEDIC SURGERY | Facility: CLINIC | Age: 50
End: 2020-11-24
Payer: COMMERCIAL

## 2020-11-24 VITALS
WEIGHT: 216 LBS | SYSTOLIC BLOOD PRESSURE: 152 MMHG | DIASTOLIC BLOOD PRESSURE: 90 MMHG | HEART RATE: 55 BPM | BODY MASS INDEX: 32.74 KG/M2 | HEIGHT: 68 IN

## 2020-11-24 DIAGNOSIS — M25.551 PAIN IN RIGHT HIP: ICD-10-CM

## 2020-11-24 DIAGNOSIS — M16.11 UNILATERAL PRIMARY OSTEOARTHRITIS, RIGHT HIP: ICD-10-CM

## 2020-11-24 PROCEDURE — 73502 X-RAY EXAM HIP UNI 2-3 VIEWS: CPT | Mod: RT

## 2020-11-24 PROCEDURE — 99215 OFFICE O/P EST HI 40 MIN: CPT

## 2020-11-24 NOTE — HISTORY OF PRESENT ILLNESS
[de-identified] : The patient is a 50 year old female being seen for evaluation of her right hip. She has advanced osteoarthritis of the right hip. She reports pain has been severe over the last few months and is now debilitating. She is ambulating and transferring with pain and stiffness. She reports limping at times. She reports pain is centered in groin and down the leg anterolaterally. She has difficulty donning/doffing shoes and socks and difficulty with transitioning in and out of the car. Pain is worse with transferring and weightbearing activities, most especially stair climbing. She reports using anti-inflammatories with mild relief of her symptoms. She reports loss of sleep due to hip pain. She reports her hip pain is severely limiting her quality of life at this time. She comes in today for evaluation of her right hip and for treatment options.

## 2020-11-24 NOTE — PHYSICAL EXAM
[de-identified] : The patient appears well nourished  and in no apparent distress.  The patient is alert and oriented to person, place, and time.   Affect and mood appear normal. The head is normocephalic and atraumatic.  The eyes reveal normal sclera and extra ocular muscles are intact. The tongue is midline with no apparent lesions.  Skin shows normal turgor with no evidence of eczema or psoriasis.  No respiratory distress noted.  Sensation grossly intact.\par   [de-identified] : Exam of the right hip shows hip flexion of 100 degrees, hip external rotation of 60 degrees, internal rotation is neutral and painful. 5/5 motor strength bilaterally distally. Sensation intact distally. Leg lengths are equal. [de-identified] : Xray- AP pelvis and 2 views right hip shows severe arthritis of the right hip.

## 2020-11-24 NOTE — ADDENDUM
[FreeTextEntry1] : This note was authored by Martin Emanuel working as a medical scribe for Dr. Jacinto Castaneda. The note was reviewed, edited, and revised by Dr. Jacinto Castaneda whom is in agreement with the exam findings, imaging findings, and treatment plan. 11/24/2020.

## 2020-11-24 NOTE — REASON FOR VISIT
[Follow-Up Visit] : a follow-up visit for [Other: ____] : [unfilled] [FreeTextEntry2] : S/P Left DAA THR, DOS: 7/8/20. \par \par

## 2020-11-24 NOTE — DISCUSSION/SUMMARY
[de-identified] : The patient is a 50 year old female with severe arthritis of the right hip. Based upon the patients continued symptoms and failure to respond to conservative treatment I have recommended a right total hip replacement for the patient. A discussion was had with the patient regarding a right total hip replacement. Anterior and posterior exposures were discussed. The patient would like to proceed with an anterior approach. A long discussion was had with the patient as what the total joint replacement would entail. A model was used to demonstrate the operation and to discuss bearing surfaces of the implants. The hospitalization and rehabilitation were discussed. The use of perioperative antibiotics and DVT prophylaxis were discussed. The risks, benefits and alternatives to surgical intervention were discussed at length with the patient. Specific risks discussed included: infection, wound breakdown, numbness and damage to nerves, tendon, muscle, arteries or other blood vessels, and the possibility of leg length discrepancy. The possibility of recurrent pain, no improvement in pain and actual worsening of the pain were also mentioned in conversation with the patient. Medical complications related to the patient's general medical health including deep vein thrombosis, pulmonary embolus, heart attack, stroke, death and other complications from anesthesia were discussed as well. The patient was told that we will take steps to minimize these risks by using sterile technique, antibiotics and DVT prophylaxis when appropriate and following the patient postoperatively in the clinic setting to monitor progress. The benefits of surgery were discussed with the patient including the potential to improve the current clinical condition through operative intervention. Alternatives to surgical intervention include continued conservative management which may yield less than optimal results in this particular patient. All questions were answered to the satisfaction of the patient. Models were used as an educational tool. We did discuss implant choices and fixation, with shared decision making with the patient.\par

## 2020-12-01 ENCOUNTER — APPOINTMENT (OUTPATIENT)
Dept: CARDIOLOGY | Facility: CLINIC | Age: 50
End: 2020-12-01

## 2020-12-01 ENCOUNTER — OUTPATIENT (OUTPATIENT)
Dept: OUTPATIENT SERVICES | Facility: HOSPITAL | Age: 50
LOS: 1 days | End: 2020-12-01
Payer: COMMERCIAL

## 2020-12-01 VITALS
WEIGHT: 224.65 LBS | TEMPERATURE: 98 F | HEART RATE: 54 BPM | RESPIRATION RATE: 20 BRPM | HEIGHT: 68 IN | SYSTOLIC BLOOD PRESSURE: 140 MMHG | DIASTOLIC BLOOD PRESSURE: 80 MMHG

## 2020-12-01 DIAGNOSIS — Z98.890 OTHER SPECIFIED POSTPROCEDURAL STATES: Chronic | ICD-10-CM

## 2020-12-01 DIAGNOSIS — D32.0 BENIGN NEOPLASM OF CEREBRAL MENINGES: Chronic | ICD-10-CM

## 2020-12-01 DIAGNOSIS — I10 ESSENTIAL (PRIMARY) HYPERTENSION: ICD-10-CM

## 2020-12-01 DIAGNOSIS — Z96.642 PRESENCE OF LEFT ARTIFICIAL HIP JOINT: Chronic | ICD-10-CM

## 2020-12-01 DIAGNOSIS — Z98.891 HISTORY OF UTERINE SCAR FROM PREVIOUS SURGERY: Chronic | ICD-10-CM

## 2020-12-01 DIAGNOSIS — Z01.818 ENCOUNTER FOR OTHER PREPROCEDURAL EXAMINATION: ICD-10-CM

## 2020-12-01 DIAGNOSIS — Z29.9 ENCOUNTER FOR PROPHYLACTIC MEASURES, UNSPECIFIED: ICD-10-CM

## 2020-12-01 DIAGNOSIS — M16.11 UNILATERAL PRIMARY OSTEOARTHRITIS, RIGHT HIP: ICD-10-CM

## 2020-12-01 DIAGNOSIS — Z90.710 ACQUIRED ABSENCE OF BOTH CERVIX AND UTERUS: Chronic | ICD-10-CM

## 2020-12-01 LAB
A1C WITH ESTIMATED AVERAGE GLUCOSE RESULT: 5.7 % — HIGH (ref 4–5.6)
ANION GAP SERPL CALC-SCNC: 12 MMOL/L — SIGNIFICANT CHANGE UP (ref 5–17)
APTT BLD: 32.9 SEC — SIGNIFICANT CHANGE UP (ref 27.5–35.5)
BASOPHILS # BLD AUTO: 0.01 K/UL — SIGNIFICANT CHANGE UP (ref 0–0.2)
BASOPHILS NFR BLD AUTO: 0.2 % — SIGNIFICANT CHANGE UP (ref 0–2)
BLD GP AB SCN SERPL QL: SIGNIFICANT CHANGE UP
BUN SERPL-MCNC: 9 MG/DL — SIGNIFICANT CHANGE UP (ref 8–20)
CALCIUM SERPL-MCNC: 9.1 MG/DL — SIGNIFICANT CHANGE UP (ref 8.6–10.2)
CHLORIDE SERPL-SCNC: 103 MMOL/L — SIGNIFICANT CHANGE UP (ref 98–107)
CO2 SERPL-SCNC: 25 MMOL/L — SIGNIFICANT CHANGE UP (ref 22–29)
CREAT SERPL-MCNC: 0.73 MG/DL — SIGNIFICANT CHANGE UP (ref 0.5–1.3)
EOSINOPHIL # BLD AUTO: 0.02 K/UL — SIGNIFICANT CHANGE UP (ref 0–0.5)
EOSINOPHIL NFR BLD AUTO: 0.3 % — SIGNIFICANT CHANGE UP (ref 0–6)
ESTIMATED AVERAGE GLUCOSE: 117 MG/DL — HIGH (ref 68–114)
GLUCOSE SERPL-MCNC: 74 MG/DL — SIGNIFICANT CHANGE UP (ref 70–99)
HCT VFR BLD CALC: 37.1 % — SIGNIFICANT CHANGE UP (ref 34.5–45)
HGB BLD-MCNC: 11.5 G/DL — SIGNIFICANT CHANGE UP (ref 11.5–15.5)
IMM GRANULOCYTES NFR BLD AUTO: 0.2 % — SIGNIFICANT CHANGE UP (ref 0–1.5)
INR BLD: 1.05 RATIO — SIGNIFICANT CHANGE UP (ref 0.88–1.16)
LYMPHOCYTES # BLD AUTO: 1.35 K/UL — SIGNIFICANT CHANGE UP (ref 1–3.3)
LYMPHOCYTES # BLD AUTO: 22.8 % — SIGNIFICANT CHANGE UP (ref 13–44)
MCHC RBC-ENTMCNC: 24.3 PG — LOW (ref 27–34)
MCHC RBC-ENTMCNC: 31 GM/DL — LOW (ref 32–36)
MCV RBC AUTO: 78.4 FL — LOW (ref 80–100)
MONOCYTES # BLD AUTO: 0.54 K/UL — SIGNIFICANT CHANGE UP (ref 0–0.9)
MONOCYTES NFR BLD AUTO: 9.1 % — SIGNIFICANT CHANGE UP (ref 2–14)
NEUTROPHILS # BLD AUTO: 4 K/UL — SIGNIFICANT CHANGE UP (ref 1.8–7.4)
NEUTROPHILS NFR BLD AUTO: 67.4 % — SIGNIFICANT CHANGE UP (ref 43–77)
PLATELET # BLD AUTO: 303 K/UL — SIGNIFICANT CHANGE UP (ref 150–400)
POTASSIUM SERPL-MCNC: 4.6 MMOL/L — SIGNIFICANT CHANGE UP (ref 3.5–5.3)
POTASSIUM SERPL-SCNC: 4.6 MMOL/L — SIGNIFICANT CHANGE UP (ref 3.5–5.3)
PROTHROM AB SERPL-ACNC: 12.2 SEC — SIGNIFICANT CHANGE UP (ref 10.6–13.6)
RBC # BLD: 4.73 M/UL — SIGNIFICANT CHANGE UP (ref 3.8–5.2)
RBC # FLD: 18.2 % — HIGH (ref 10.3–14.5)
SODIUM SERPL-SCNC: 140 MMOL/L — SIGNIFICANT CHANGE UP (ref 135–145)
WBC # BLD: 5.93 K/UL — SIGNIFICANT CHANGE UP (ref 3.8–10.5)
WBC # FLD AUTO: 5.93 K/UL — SIGNIFICANT CHANGE UP (ref 3.8–10.5)

## 2020-12-01 PROCEDURE — 93005 ELECTROCARDIOGRAM TRACING: CPT

## 2020-12-01 PROCEDURE — G0463: CPT

## 2020-12-01 PROCEDURE — 93010 ELECTROCARDIOGRAM REPORT: CPT

## 2020-12-01 NOTE — H&P PST ADULT - MUSCULOSKELETAL
details… detailed exam joint swelling/decreased ROM due to pain right hip/decreased ROM due to pain/joint swelling

## 2020-12-01 NOTE — H&P PST ADULT - NSICDXPASTSURGICALHX_GEN_ALL_CORE_FT
PAST SURGICAL HISTORY:  Benign meningioma excision     H/O  section X1    H/O lumpectomy left      H/O: hysterectomy PAST SURGICAL HISTORY:  Benign meningioma excision     H/O  section X1    H/O lumpectomy left   benign tumor    H/O: hysterectomy     History of total hip replacement, left 2020

## 2020-12-01 NOTE — H&P PST ADULT - ENERGY EXPENDITURE (METS)
"MD Balderrama at bedside to see patient. Patient B/P 81/55 at this time. Per MD Balderrama" I will write to prepare 1 unit PRBC". Pending lab results    " >4

## 2020-12-01 NOTE — H&P PST ADULT - NSICDXFAMILYHX_GEN_ALL_CORE_FT
FAMILY HISTORY:  FH: HTN (hypertension), aunt  FHx: diabetes mellitus, grandmother FAMILY HISTORY:  FH: HTN (hypertension), aunt  FHx: diabetes mellitus, grandmother

## 2020-12-01 NOTE — H&P PST ADULT - NSICDXPROBLEM_GEN_ALL_CORE_FT
PROBLEM DIAGNOSES  Problem: Unilateral primary osteoarthritis, right hip  Assessment and Plan: Right total hip replacement, direct anterior approach. F/u with PCP for medical clearance     Problem: Need for prophylactic measure  Assessment and Plan: high risk surgical team to determine prophylactic intervention     Problem: Hypertension  Assessment and Plan: f/u with cardiologist for clearance

## 2020-12-01 NOTE — H&P PST ADULT - HISTORY OF PRESENT ILLNESS
49 y/o female with HTN, OA, Asthma, loop recorder, seen today pre-op for right total hip replacement, direct anterior approach for unilateral primary OA, right hip. Pt recently underwent left total hip replacement(July, 2020 ) by Dr. Castaneda. Report right hip pain started about 2+ years ago, report pain has progressively gotten worse within the past few months, report pain ranges between 5/10 to 6/10, right hip pain worse on ambulation, weight bearing activities, report right hip pain radiates to right groin, right lower back, denies any trauma  to site . Relieved with Ibuprofen, Tylenol and rest. Seen today for a scheduled surgery with Dr. Castaneda.

## 2020-12-01 NOTE — PATIENT PROFILE ADULT - NSPREOP1_ABLETOREACHPT_GEN_A_NUR
[phone]    denies domestic or international travel in the past 3 weeks [phone]    denies domestic or international travel in the past 3 weeks/yes
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
## 2020-12-01 NOTE — H&P PST ADULT - ASSESSMENT
49 y/o female with HTN, OA, Asthma, loop recorder, seen today pre-op for right total hip replacement, direct anterior approach for unilateral primary OA, right hip. Pt recently underwent left total hip replacement( ) by Dr. Castaneda. Report right hip pain started about 2+ years ago, report pain has progressively gotten worse within the past few months, report pain ranges between 5/10 to 6/10, right hip pain worse on ambulation, denies  any trauma to site . Relieved with Ibuprofen, Tylenol and rest. Seen today for a scheduled surgery with Dr. Castaneda. Surgery protocol reviewed with pt today. Pt to follow-up with cardiologist and PCP for clearances  CAPRINI VTE 2.0 SCORE [CLOT updated 2019]    AGE RELATED RISK FACTORS                                                       MOBILITY RELATED FACTORS  [x ] Age 41-60 years                                            (1 Point)                    [ ] Bed rest                                                        (1 Point)  [ ] Age: 61-74 years                                           (2 Points)                  [ ] Plaster cast                                                   (2 Points)  [ ] Age= 75 years                                              (3 Points)                    [ ] Bed bound for more than 72 hours                 (2 Points)    DISEASE RELATED RISK FACTORS                                               GENDER SPECIFIC FACTORS  [x] Edema in the lower extremities                       (1 Point)              [ ] Pregnancy                                                     (1 Point)  [ ] Varicose veins                                               (1 Point)                     [ ] Post-partum < 6 weeks                                   (1 Point)             [x ] BMI > 25 Kg/m2                                            (1 Point)                     [ ] Hormonal therapy  or oral contraception          (1 Point)                 [ ] Sepsis (in the previous month)                        (1 Point)               [ ] History of pregnancy complications                 (1 point)  [ ] Pneumonia or serious lung disease                                               [ ] Unexplained or recurrent                     (1 Point)           (in the previous month)                               (1 Point)  [ ] Abnormal pulmonary function test                     (1 Point)                 SURGERY RELATED RISK FACTORS  [ ] Acute myocardial infarction                              (1 Point)               [ ]  Section                                             (1 Point)  [ ] Congestive heart failure (in the previous month)  (1 Point)      [ ] Minor surgery                                                  (1 Point)   [ ] Inflammatory bowel disease                             (1 Point)               [ ] Arthroscopic surgery                                        (2 Points)  [ ] Central venous access                                      (2 Points)                [ ] General surgery lasting more than 45 minutes (2 points)  [ ] Malignancy- Present or previous                   (2 Points)                [ x] Elective arthroplasty                                         (5 points)    [ ] Stroke (in the previous month)                          (5 Points)                                                                                                                                                           HEMATOLOGY RELATED FACTORS                                                 TRAUMA RELATED RISK FACTORS  [ ] Prior episodes of VTE                                     (3 Points)                [ ] Fracture of the hip, pelvis, or leg                       (5 Points)  [ ] Positive family history for VTE                         (3 Points)             [ ] Acute spinal cord injury (in the previous month)  (5 Points)  [ ] Prothrombin 65732 A                                     (3 Points)               [ ] Paralysis  (less than 1 month)                             (5 Points)  [ ] Factor V Leiden                                             (3 Points)                  [ ] Multiple Trauma within 1 month                        (5 Points)  [ ] Lupus anticoagulants                                     (3 Points)                                                           [ ] Anticardiolipin antibodies                               (3 Points)                                                       [ ] High homocysteine in the blood                      (3 Points)                                             [ ] Other congenital or acquired thrombophilia      (3 Points)                                                [ ] Heparin induced thrombocytopenia                  (3 Points)                                     Total Score [   8       ]  OPIOID RISK TOOL    RAMU EACH BOX THAT APPLIES AND ADD TOTALS AT THE END    FAMILY HISTORY OF SUBSTANCE ABUSE                 FEMALE         MALE                                                Alcohol                             [  ]1 pt          [  ]3pts                                               Illegal Durgs                     [  ]2 pts        [  ]3pts                                               Rx Drugs                           [  ]4 pts        [  ]4 pts    PERSONAL HISTORY OF SUBSTANCE ABUSE                                                                                          Alcohol                             [  ]3 pts       [  ]3 pts                                               Illegal Drugs                     [  ]4 pts        [  ]4 pts                                               Rx Drugs                           [  ]5 pts        [  ]5 pts    AGE BETWEEN 16-45 YEARS                                      [  ]1 pt         [  ]1 pt    HISTORY OF PREADOLESCENT   SEXUAL ABUSE                                                             [  ]3 pts        [  ]0pts    PSYCHOLOGICAL DISEASE                     ADD, OCD, Bipolar, Schizophrenia        [  ]2 pts         [  ]2 pts                      Depression                                               [  ]1 pt           [  ]1 pt           SCORING TOTAL   (add numbers and type here)              (**0*)                                     A score of 3 or lower indicated LOW risk for future opioid abuse  A score of 4 to 7 indicated moderate risk for future opioid abuse  A score of 8 or higher indicates a high risk for opioid abuse

## 2020-12-01 NOTE — PATIENT PROFILE ADULT - NSPROHMSYMPCOND_GEN_A_NUR
Pre op teaching surgical scrub pain management instructions given to pt    Covid swab to be done Dec 8

## 2020-12-02 LAB
MRSA PCR RESULT.: SIGNIFICANT CHANGE UP
S AUREUS DNA NOSE QL NAA+PROBE: SIGNIFICANT CHANGE UP

## 2020-12-03 ENCOUNTER — APPOINTMENT (OUTPATIENT)
Dept: CARDIOLOGY | Facility: CLINIC | Age: 50
End: 2020-12-03

## 2020-12-04 ENCOUNTER — NON-APPOINTMENT (OUTPATIENT)
Age: 50
End: 2020-12-04

## 2020-12-08 ENCOUNTER — APPOINTMENT (OUTPATIENT)
Dept: DISASTER EMERGENCY | Facility: CLINIC | Age: 50
End: 2020-12-08

## 2020-12-10 ENCOUNTER — TRANSCRIPTION ENCOUNTER (OUTPATIENT)
Age: 50
End: 2020-12-10

## 2020-12-11 ENCOUNTER — TRANSCRIPTION ENCOUNTER (OUTPATIENT)
Age: 50
End: 2020-12-11

## 2020-12-11 ENCOUNTER — APPOINTMENT (OUTPATIENT)
Dept: ORTHOPEDIC SURGERY | Facility: HOSPITAL | Age: 50
End: 2020-12-11

## 2020-12-11 ENCOUNTER — INPATIENT (INPATIENT)
Facility: HOSPITAL | Age: 50
LOS: 0 days | Discharge: HOME | DRG: 470 | End: 2020-12-12
Attending: ORTHOPAEDIC SURGERY | Admitting: ORTHOPAEDIC SURGERY
Payer: COMMERCIAL

## 2020-12-11 VITALS
RESPIRATION RATE: 16 BRPM | DIASTOLIC BLOOD PRESSURE: 83 MMHG | OXYGEN SATURATION: 100 % | WEIGHT: 220.02 LBS | HEIGHT: 68 IN | HEART RATE: 61 BPM | TEMPERATURE: 98 F | SYSTOLIC BLOOD PRESSURE: 122 MMHG

## 2020-12-11 DIAGNOSIS — Z98.891 HISTORY OF UTERINE SCAR FROM PREVIOUS SURGERY: Chronic | ICD-10-CM

## 2020-12-11 DIAGNOSIS — Z96.642 PRESENCE OF LEFT ARTIFICIAL HIP JOINT: Chronic | ICD-10-CM

## 2020-12-11 DIAGNOSIS — Z98.890 OTHER SPECIFIED POSTPROCEDURAL STATES: Chronic | ICD-10-CM

## 2020-12-11 DIAGNOSIS — Z90.710 ACQUIRED ABSENCE OF BOTH CERVIX AND UTERUS: Chronic | ICD-10-CM

## 2020-12-11 DIAGNOSIS — D32.0 BENIGN NEOPLASM OF CEREBRAL MENINGES: Chronic | ICD-10-CM

## 2020-12-11 DIAGNOSIS — M16.11 UNILATERAL PRIMARY OSTEOARTHRITIS, RIGHT HIP: ICD-10-CM

## 2020-12-11 LAB
GLUCOSE BLDC GLUCOMTR-MCNC: 68 MG/DL — LOW (ref 70–99)
GLUCOSE BLDC GLUCOMTR-MCNC: 77 MG/DL — SIGNIFICANT CHANGE UP (ref 70–99)
GLUCOSE BLDC GLUCOMTR-MCNC: 89 MG/DL — SIGNIFICANT CHANGE UP (ref 70–99)

## 2020-12-11 PROCEDURE — 27130 TOTAL HIP ARTHROPLASTY: CPT | Mod: AS,RT

## 2020-12-11 PROCEDURE — 99222 1ST HOSP IP/OBS MODERATE 55: CPT

## 2020-12-11 PROCEDURE — 27130 TOTAL HIP ARTHROPLASTY: CPT | Mod: RT

## 2020-12-11 PROCEDURE — 73501 X-RAY EXAM HIP UNI 1 VIEW: CPT | Mod: 26,RT

## 2020-12-11 RX ORDER — SENNA PLUS 8.6 MG/1
2 TABLET ORAL AT BEDTIME
Refills: 0 | Status: DISCONTINUED | OUTPATIENT
Start: 2020-12-11 | End: 2020-12-12

## 2020-12-11 RX ORDER — SODIUM CHLORIDE 9 MG/ML
3 INJECTION INTRAMUSCULAR; INTRAVENOUS; SUBCUTANEOUS EVERY 8 HOURS
Refills: 0 | Status: DISCONTINUED | OUTPATIENT
Start: 2020-12-11 | End: 2020-12-11

## 2020-12-11 RX ORDER — HYDROMORPHONE HYDROCHLORIDE 2 MG/ML
4 INJECTION INTRAMUSCULAR; INTRAVENOUS; SUBCUTANEOUS
Refills: 0 | Status: DISCONTINUED | OUTPATIENT
Start: 2020-12-11 | End: 2020-12-12

## 2020-12-11 RX ORDER — MAGNESIUM HYDROXIDE 400 MG/1
30 TABLET, CHEWABLE ORAL AT BEDTIME
Refills: 0 | Status: DISCONTINUED | OUTPATIENT
Start: 2020-12-11 | End: 2020-12-12

## 2020-12-11 RX ORDER — SODIUM CHLORIDE 9 MG/ML
500 INJECTION INTRAMUSCULAR; INTRAVENOUS; SUBCUTANEOUS ONCE
Refills: 0 | Status: COMPLETED | OUTPATIENT
Start: 2020-12-11 | End: 2020-12-11

## 2020-12-11 RX ORDER — ASPIRIN/CALCIUM CARB/MAGNESIUM 324 MG
325 TABLET ORAL
Refills: 0 | Status: DISCONTINUED | OUTPATIENT
Start: 2020-12-12 | End: 2020-12-12

## 2020-12-11 RX ORDER — CELECOXIB 200 MG/1
200 CAPSULE ORAL
Refills: 0 | Status: DISCONTINUED | OUTPATIENT
Start: 2020-12-13 | End: 2020-12-12

## 2020-12-11 RX ORDER — OXYCODONE HYDROCHLORIDE 5 MG/1
10 TABLET ORAL
Refills: 0 | Status: DISCONTINUED | OUTPATIENT
Start: 2020-12-11 | End: 2020-12-12

## 2020-12-11 RX ORDER — TRANEXAMIC ACID 100 MG/ML
1000 INJECTION, SOLUTION INTRAVENOUS ONCE
Refills: 0 | Status: DISCONTINUED | OUTPATIENT
Start: 2020-12-11 | End: 2020-12-11

## 2020-12-11 RX ORDER — HYDROMORPHONE HYDROCHLORIDE 2 MG/ML
0.5 INJECTION INTRAMUSCULAR; INTRAVENOUS; SUBCUTANEOUS
Refills: 0 | Status: DISCONTINUED | OUTPATIENT
Start: 2020-12-11 | End: 2020-12-11

## 2020-12-11 RX ORDER — CELECOXIB 200 MG/1
400 CAPSULE ORAL ONCE
Refills: 0 | Status: COMPLETED | OUTPATIENT
Start: 2020-12-11 | End: 2020-12-11

## 2020-12-11 RX ORDER — SODIUM CHLORIDE 9 MG/ML
1000 INJECTION, SOLUTION INTRAVENOUS
Refills: 0 | Status: DISCONTINUED | OUTPATIENT
Start: 2020-12-11 | End: 2020-12-12

## 2020-12-11 RX ORDER — APREPITANT 80 MG/1
40 CAPSULE ORAL ONCE
Refills: 0 | Status: COMPLETED | OUTPATIENT
Start: 2020-12-11 | End: 2020-12-11

## 2020-12-11 RX ORDER — OXYCODONE HYDROCHLORIDE 5 MG/1
5 TABLET ORAL
Refills: 0 | Status: DISCONTINUED | OUTPATIENT
Start: 2020-12-11 | End: 2020-12-12

## 2020-12-11 RX ORDER — SODIUM CHLORIDE 9 MG/ML
1000 INJECTION, SOLUTION INTRAVENOUS
Refills: 0 | Status: DISCONTINUED | OUTPATIENT
Start: 2020-12-11 | End: 2020-12-11

## 2020-12-11 RX ORDER — METOPROLOL TARTRATE 50 MG
25 TABLET ORAL
Refills: 0 | Status: DISCONTINUED | OUTPATIENT
Start: 2020-12-12 | End: 2020-12-12

## 2020-12-11 RX ORDER — CEFAZOLIN SODIUM 1 G
2000 VIAL (EA) INJECTION ONCE
Refills: 0 | Status: DISCONTINUED | OUTPATIENT
Start: 2020-12-11 | End: 2020-12-11

## 2020-12-11 RX ORDER — ACETAMINOPHEN 500 MG
975 TABLET ORAL EVERY 8 HOURS
Refills: 0 | Status: DISCONTINUED | OUTPATIENT
Start: 2020-12-11 | End: 2020-12-12

## 2020-12-11 RX ORDER — ACETAMINOPHEN 500 MG
975 TABLET ORAL ONCE
Refills: 0 | Status: COMPLETED | OUTPATIENT
Start: 2020-12-11 | End: 2020-12-11

## 2020-12-11 RX ORDER — POLYETHYLENE GLYCOL 3350 17 G/17G
17 POWDER, FOR SOLUTION ORAL DAILY
Refills: 0 | Status: DISCONTINUED | OUTPATIENT
Start: 2020-12-11 | End: 2020-12-12

## 2020-12-11 RX ORDER — FENTANYL CITRATE 50 UG/ML
25 INJECTION INTRAVENOUS
Refills: 0 | Status: DISCONTINUED | OUTPATIENT
Start: 2020-12-11 | End: 2020-12-11

## 2020-12-11 RX ORDER — CEFAZOLIN SODIUM 1 G
2000 VIAL (EA) INJECTION
Refills: 0 | Status: COMPLETED | OUTPATIENT
Start: 2020-12-11 | End: 2020-12-12

## 2020-12-11 RX ORDER — KETOROLAC TROMETHAMINE 30 MG/ML
15 SYRINGE (ML) INJECTION EVERY 6 HOURS
Refills: 0 | Status: DISCONTINUED | OUTPATIENT
Start: 2020-12-11 | End: 2020-12-12

## 2020-12-11 RX ORDER — ONDANSETRON 8 MG/1
4 TABLET, FILM COATED ORAL EVERY 6 HOURS
Refills: 0 | Status: DISCONTINUED | OUTPATIENT
Start: 2020-12-11 | End: 2020-12-12

## 2020-12-11 RX ORDER — HYDROMORPHONE HYDROCHLORIDE 2 MG/ML
0.5 INJECTION INTRAMUSCULAR; INTRAVENOUS; SUBCUTANEOUS
Refills: 0 | Status: DISCONTINUED | OUTPATIENT
Start: 2020-12-11 | End: 2020-12-12

## 2020-12-11 RX ADMIN — HYDROMORPHONE HYDROCHLORIDE 0.5 MILLIGRAM(S): 2 INJECTION INTRAMUSCULAR; INTRAVENOUS; SUBCUTANEOUS at 15:18

## 2020-12-11 RX ADMIN — Medication 100 MILLIGRAM(S): at 17:58

## 2020-12-11 RX ADMIN — Medication 975 MILLIGRAM(S): at 22:09

## 2020-12-11 RX ADMIN — OXYCODONE HYDROCHLORIDE 10 MILLIGRAM(S): 5 TABLET ORAL at 21:05

## 2020-12-11 RX ADMIN — OXYCODONE HYDROCHLORIDE 10 MILLIGRAM(S): 5 TABLET ORAL at 20:36

## 2020-12-11 RX ADMIN — SODIUM CHLORIDE 500 MILLILITER(S): 9 INJECTION INTRAMUSCULAR; INTRAVENOUS; SUBCUTANEOUS at 13:49

## 2020-12-11 RX ADMIN — Medication 975 MILLIGRAM(S): at 22:39

## 2020-12-11 RX ADMIN — Medication 15 MILLIGRAM(S): at 18:01

## 2020-12-11 RX ADMIN — HYDROMORPHONE HYDROCHLORIDE 0.5 MILLIGRAM(S): 2 INJECTION INTRAMUSCULAR; INTRAVENOUS; SUBCUTANEOUS at 14:48

## 2020-12-11 RX ADMIN — SENNA PLUS 2 TABLET(S): 8.6 TABLET ORAL at 22:39

## 2020-12-11 RX ADMIN — CELECOXIB 400 MILLIGRAM(S): 200 CAPSULE ORAL at 07:46

## 2020-12-11 RX ADMIN — Medication 975 MILLIGRAM(S): at 07:47

## 2020-12-11 RX ADMIN — APREPITANT 40 MILLIGRAM(S): 80 CAPSULE ORAL at 07:46

## 2020-12-11 NOTE — CONSULT NOTE ADULT - ASSESSMENT
51 y/o female with OA s/p left JESS 7/2020, Asthma, Afib s/p loop recorder with no recorded Afib since 2018 when placed (on ASA), NSVT, benign brain meningioma 2011 presents with right hip pain [id number], worsening in last few months, with associated difficulty ambulating, controlled with Ibuprofen, now s/p right JESS POD #0.  Patient seen and examined in PACU.      Right hip OA  S/p Right JESS POD #0  Pain control.  PT/OT.  Antibiotic, wound care and DVT prophylaxis as per Ortho.  Incentive spirometry.  Avoid opioid induced constipation.    Afib/NSVT with h/o loop recorder  Continue Metoprolol.  No AC at home, Patient was taking ASA.  Currently NSR/SB 50-60.  Follows with cardiology and EP as outpatient.    Asthma  Patient reports no exacerbations in many years.  Ventolin prn.    Prediabetes  A1c 5.7.    Recommend outpatient follow up with primary doctor.  ADA diet.    DVT prophylaxis  ASA BID as per Ortho.

## 2020-12-11 NOTE — PHYSICAL THERAPY INITIAL EVALUATION ADULT - GAIT DEVIATIONS NOTED, PT EVAL
----- Message from [facility] sent at 12/1/2017 11:32 AM EST -----  Regarding: Dr. Kelvin Sawant  The patient is requesting a call back to schedule a hospital follow up with the doctor.  ([phone] decreased shanell/decreased step length/decreased stride length/decreased weight-shifting ability

## 2020-12-11 NOTE — DISCHARGE NOTE PROVIDER - CARE PROVIDER_API CALL
Jacinto Castaneda)  Orthopaedic Surgery  [address], [address]  [place] [address]  Phone: [phone]  Fax: [phone]  Follow Up Time:

## 2020-12-11 NOTE — CONSULT NOTE ADULT - ATTENDING COMMENTS
pt seen and examined at bedside   50yr old female with PMH of Afib, NSVT s/p ILR placement, Jew, chronic anemia ? cause, obese female   now s/p rt JESS POD#0. c/o suprapubic cramps. urinated 300ml with primafit  obese, vitals - stable   RS: CTAB, CVS - S1, S2 normal   Suprapubic fullness +   Agree with above   Bladder scan - stat showed >900cc - straight cath stat , watch for postoperative urinary retention   obesity - will benefit from weight reduction, recommendation dietary eval on outpatient  EBL - 600ml during Surgery - Check CBC, monitor vitals closely     Labs  Dr Mcginnis will follow in am

## 2020-12-11 NOTE — DISCHARGE NOTE PROVIDER - HOSPITAL COURSE
The patient underwent a RIGHT ANTERIOR TOTAL HIP REPLACEMENT on 12/11. The patient received antibiotics consistent with SCIP guidelines. The patient underwent the procedure and had no intra-operative complications. Post-operatively, the patient was seen by medicine and PT. The patient received ASPIRIN for DVTP. The patient received pain medications per orthopedic pain management pathway and the pain was appropriately controlled. Patient was instructed on anterior total hip precautions by PT. The patient did not have any post-operative medical complications. The patient was discharged in stable condition.

## 2020-12-11 NOTE — DISCHARGE NOTE NURSING/CASE MANAGEMENT/SOCIAL WORK - PATIENT PORTAL LINK FT
You can access the FollowMyHealth Patient Portal offered by [facility] by registering at the following website: [email or web address]. By joining [facility]’s FollowMyHealth portal, you will also be able to view your health information using other applications (apps) compatible with our system.

## 2020-12-11 NOTE — DISCHARGE NOTE PROVIDER - NSDCFUADDINST_GEN_ALL_CORE_FT
The patient will be seen in the office between 2-3 weeks for wound check.   **Your first post-operative visit has been scheduled prior to your admission. PLEASE CONTACT OFFICE TO CONFIRM THE APPOINTMENT DATE.   **  The silver based dressing is to be removed 7 days from the date of surgery (12/18).   ** CONTACT THE OFFICE IF THE FOLLOWING DEVELOP:  - the dressing becomes soiled or saturated  - you develop a fever greater that 101F  - the wound becomes red or you develop blistering around the wound  * Patient may shower after post-op day #3 (12/14).   * The patient will continue home PT consistent with anterior total hip replacement protocol and will continue to practice anterior total hip precautions for a minimum of 6 week. Transition to outpatient PT will occur at the time of the first office visit.   * The patient is FULL weight bearing.  * The patient will continue ASPIRIN for 6 weeks after surgery for blood clot prevention.  * While on aspirin, the patient will take daily omeprazole or other similar medication to protect the stomach from irritation.   * The patient will take OXYCODONE AND TYLENOL for pain control and adjust according to prescription and patient needs. Contact the office if pain increases while taking prescribed pain medications or related concerns develop.   * Celebrex will be taken twice daily for 3 weeks for pain control and prevention of excessive bone growth. Additional prescription may be requested at your office follow-up visit.  * The patient will take Senna S while taking oxycodone to prevent narcotic associated constipation.  Additionally, increase water intake (drink at least 8 glasses of water daily) and try adding fiber to the diet by eating fruits, vegetables and foods that are rich in grains. If constipation is experienced, contact the medical/primary care provider to discuss further treatment options.  * To avoid injury at home:  - continue use of rolling walker until cleared by physical therapist  - have family or friend remove all throw rug or objects in hallways that may present a trip hazard.  - if you experience any dizziness or medical concerns, call your medical doctor or  911.  * The implant may activate metal detection devices.

## 2020-12-11 NOTE — ASU PREOP CHECKLIST - INTERNAL PROSTHESES
left hip replacemant and loop recorder left hip replacemant and loop recorder and hardware right foot

## 2020-12-11 NOTE — PROGRESS NOTE ADULT - SUBJECTIVE AND OBJECTIVE BOX
Ortho Post Op Check    Name: LIZ ELAM    MR #: [id number]    Procedure: right anterior total hip arthroplasty   Surgeon: Leonel    Pt comfortable without complaints, pain controlled  Denies CP, SOB, N/V, numbness/tingling     General Exam:  Vital Signs Last 24 Hrs  T(C): 36.8 (12-11-20 @ 19:15), Max: 36.8 (12-11-20 @ 19:15)  T(F): 98.3 (12-11-20 @ 19:15), Max: 98.3 (12-11-20 @ 19:15)  HR: 70 (12-11-20 @ 19:15) (60 - 74)  BP: 132/60 (12-11-20 @ 19:15) (120/76 - 133/80)  BP(mean): --  RR: 18 (12-11-20 @ 19:15) (10 - 20)  SpO2: 93% (12-11-20 @ 19:15) (93% - 100%)    General: Pt Alert and oriented, NAD, controlled pain.  Dressings C/D/I. No bleeding.  Pulses: 2+ dorsalis pedis pulse. Cap refill < 2 sec.  Sensation: Grossly intact to light touch without deficit.  Motor: + EHL/FHL/TA/GS        Post-op X-Ray:   EXAM:  XR HIP WITH PELV 1V RT                          PROCEDURE DATE:  12/11/2020      INTERPRETATION:  Right hip and pelvis. 2 views.    Postop study shows a right hip replacement with good alignment. No bone destruction or fracture.    Pelvic view shows a symmetric left hip replacement.    IMPRESSION: New right hip replacement. Older left hip replacement.    MADDIE COCHRAN MD; Attending Radiologist  This document has been electronically signed. Dec 11 2020  2:18PM      A/P: 50yFemale POD#0 s/p right anterior total hip arthroplasty     - Pain Control  - DVT ppx:  BID  - Post op abx: as per SCIP  - PT  - anterior hip precautions reviewed  - Weight bearing status: WBAT RLE

## 2020-12-11 NOTE — BRIEF OPERATIVE NOTE - NSICDXBRIEFPOSTOP_GEN_ALL_CORE_FT
POST-OP DIAGNOSIS:  Unilateral primary osteoarthritis, right hip 11-Dec-2020 12:32:52  Irene Singer

## 2020-12-11 NOTE — PHYSICAL THERAPY INITIAL EVALUATION ADULT - ADDITIONAL COMMENTS
Patient lives in a private house with 4 ROME with railing and all needs met on main floor. She lives with her son who is able to assist if needed. She was independent prior to admission without use of AD, has RW, commode, cane.

## 2020-12-11 NOTE — DISCHARGE NOTE PROVIDER - NSDCCPCAREPLAN_GEN_ALL_CORE_FT
PRINCIPAL DISCHARGE DIAGNOSIS  Diagnosis: Unilateral primary osteoarthritis, right hip  Assessment and Plan of Treatment:

## 2020-12-11 NOTE — PHYSICAL THERAPY INITIAL EVALUATION ADULT - GENERAL OBSERVATIONS, REHAB EVAL
Patient received lying in bed, NAD, breathing RA, +tele, +SCDs. Pt agreeable to Physical Therapy evaluation.

## 2020-12-11 NOTE — DISCHARGE NOTE PROVIDER - NSDCFUSCHEDAPPT_GEN_ALL_CORE_FT
LIZ ELAM ; 12/21/2020 ; [facility] CardioElectro [address]  LIZ ELAM ; 01/05/2021 ; [facility] OrthoS[facility] [address] [facility] [address]  LIZ ELAM ; 01/08/2021 ; [facility] Cardio [address]  LIZ ELAM ; 01/22/2021 ; [facility] Ortho [facility] [address]

## 2020-12-11 NOTE — BRIEF OPERATIVE NOTE - NSICDXBRIEFPREOP_GEN_ALL_CORE_FT
PRE-OP DIAGNOSIS:  Unilateral primary osteoarthritis, right hip 11-Dec-2020 12:32:45  Irene Singer

## 2020-12-11 NOTE — DISCHARGE NOTE PROVIDER - NSDCMRMEDTOKEN_GEN_ALL_CORE_FT
acetaminophen 325 mg oral tablet: 3 tab(s) orally every 8 hours  aspirin 325 mg oral delayed release tablet: 1 tab(s) orally 2 times a day  folic acid: orally once a day  ibuprofen:   metoprolol tartrate 25 mg oral tablet: 1 tab(s) orally 2 times a day   acetaminophen 325 mg oral tablet: 3 tab(s) orally every 8 hours  aspirin 325 mg oral delayed release tablet: 1 tab(s) orally 2 times a day x 6weeks, then may resume home dose  celecoxib 200 mg oral capsule: 1 cap(s) orally 2 times a day  folic acid: orally once a day  metoprolol tartrate 25 mg oral tablet: 1 tab(s) orally 2 times a day  omeprazole 20 mg oral delayed release capsule: 1 cap(s) orally once a day   oxyCODONE 5 mg oral tablet: 1 tab(s) orally every 3 hours, As needed, Mild Pain (1 - 3)  Senna S 50 mg-8.6 mg oral tablet: 2 tab(s) orally once a day (at bedtime)
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
## 2020-12-12 VITALS
TEMPERATURE: 98 F | SYSTOLIC BLOOD PRESSURE: 98 MMHG | DIASTOLIC BLOOD PRESSURE: 57 MMHG | RESPIRATION RATE: 19 BRPM | HEART RATE: 57 BPM | OXYGEN SATURATION: 98 %

## 2020-12-12 LAB
ANION GAP SERPL CALC-SCNC: 7 MMOL/L — SIGNIFICANT CHANGE UP (ref 5–17)
BUN SERPL-MCNC: 7 MG/DL — LOW (ref 8–20)
CALCIUM SERPL-MCNC: 9 MG/DL — SIGNIFICANT CHANGE UP (ref 8.6–10.2)
CHLORIDE SERPL-SCNC: 106 MMOL/L — SIGNIFICANT CHANGE UP (ref 98–107)
CO2 SERPL-SCNC: 24 MMOL/L — SIGNIFICANT CHANGE UP (ref 22–29)
CREAT SERPL-MCNC: 0.64 MG/DL — SIGNIFICANT CHANGE UP (ref 0.5–1.3)
GLUCOSE SERPL-MCNC: 113 MG/DL — HIGH (ref 70–99)
HCT VFR BLD CALC: 33.5 % — LOW (ref 34.5–45)
HGB BLD-MCNC: 10.5 G/DL — LOW (ref 11.5–15.5)
MCHC RBC-ENTMCNC: 24.8 PG — LOW (ref 27–34)
MCHC RBC-ENTMCNC: 31.3 GM/DL — LOW (ref 32–36)
MCV RBC AUTO: 79.2 FL — LOW (ref 80–100)
PLATELET # BLD AUTO: 304 K/UL — SIGNIFICANT CHANGE UP (ref 150–400)
POTASSIUM SERPL-MCNC: 4.7 MMOL/L — SIGNIFICANT CHANGE UP (ref 3.5–5.3)
POTASSIUM SERPL-SCNC: 4.7 MMOL/L — SIGNIFICANT CHANGE UP (ref 3.5–5.3)
RBC # BLD: 4.23 M/UL — SIGNIFICANT CHANGE UP (ref 3.8–5.2)
RBC # FLD: 17.4 % — HIGH (ref 10.3–14.5)
SODIUM SERPL-SCNC: 137 MMOL/L — SIGNIFICANT CHANGE UP (ref 135–145)
WBC # BLD: 15.53 K/UL — HIGH (ref 3.8–10.5)
WBC # FLD AUTO: 15.53 K/UL — HIGH (ref 3.8–10.5)

## 2020-12-12 PROCEDURE — 76000 FLUOROSCOPY <1 HR PHYS/QHP: CPT

## 2020-12-12 PROCEDURE — 36415 COLL VENOUS BLD VENIPUNCTURE: CPT

## 2020-12-12 PROCEDURE — 97116 GAIT TRAINING THERAPY: CPT

## 2020-12-12 PROCEDURE — 73501 X-RAY EXAM HIP UNI 1 VIEW: CPT

## 2020-12-12 PROCEDURE — C1776: CPT

## 2020-12-12 PROCEDURE — 99232 SBSQ HOSP IP/OBS MODERATE 35: CPT

## 2020-12-12 PROCEDURE — 85027 COMPLETE CBC AUTOMATED: CPT

## 2020-12-12 PROCEDURE — 97163 PT EVAL HIGH COMPLEX 45 MIN: CPT

## 2020-12-12 PROCEDURE — 82962 GLUCOSE BLOOD TEST: CPT

## 2020-12-12 PROCEDURE — 97110 THERAPEUTIC EXERCISES: CPT

## 2020-12-12 PROCEDURE — C1713: CPT

## 2020-12-12 PROCEDURE — 80048 BASIC METABOLIC PNL TOTAL CA: CPT

## 2020-12-12 PROCEDURE — 97167 OT EVAL HIGH COMPLEX 60 MIN: CPT

## 2020-12-12 PROCEDURE — C1889: CPT

## 2020-12-12 RX ORDER — ACETAMINOPHEN 500 MG
3 TABLET ORAL
Qty: 0 | Refills: 0 | DISCHARGE
Start: 2020-12-12

## 2020-12-12 RX ORDER — SENNOSIDES/DOCUSATE SODIUM 8.6MG-50MG
2 TABLET ORAL
Qty: 28 | Refills: 0
Start: 2020-12-12 | End: 2020-12-25

## 2020-12-12 RX ORDER — CELECOXIB 200 MG/1
1 CAPSULE ORAL
Qty: 42 | Refills: 0
Start: 2020-12-12 | End: 2021-01-01

## 2020-12-12 RX ORDER — ASPIRIN/CALCIUM CARB/MAGNESIUM 324 MG
1 TABLET ORAL
Qty: 84 | Refills: 0
Start: 2020-12-12 | End: 2021-01-22

## 2020-12-12 RX ORDER — OXYCODONE HYDROCHLORIDE 5 MG/1
1 TABLET ORAL
Qty: 0 | Refills: 0 | DISCHARGE
Start: 2020-12-12

## 2020-12-12 RX ORDER — OMEPRAZOLE 10 MG/1
1 CAPSULE, DELAYED RELEASE ORAL
Qty: 42 | Refills: 0
Start: 2020-12-12 | End: 2021-01-22

## 2020-12-12 RX ORDER — IBUPROFEN 200 MG
0 TABLET ORAL
Qty: 0 | Refills: 0 | DISCHARGE

## 2020-12-12 RX ADMIN — Medication 15 MILLIGRAM(S): at 00:30

## 2020-12-12 RX ADMIN — OXYCODONE HYDROCHLORIDE 10 MILLIGRAM(S): 5 TABLET ORAL at 12:12

## 2020-12-12 RX ADMIN — Medication 100 MILLIGRAM(S): at 01:52

## 2020-12-12 RX ADMIN — Medication 975 MILLIGRAM(S): at 06:07

## 2020-12-12 RX ADMIN — Medication 325 MILLIGRAM(S): at 06:07

## 2020-12-12 RX ADMIN — SODIUM CHLORIDE 150 MILLILITER(S): 9 INJECTION, SOLUTION INTRAVENOUS at 01:53

## 2020-12-12 RX ADMIN — OXYCODONE HYDROCHLORIDE 5 MILLIGRAM(S): 5 TABLET ORAL at 06:39

## 2020-12-12 RX ADMIN — Medication 25 MILLIGRAM(S): at 06:08

## 2020-12-12 RX ADMIN — OXYCODONE HYDROCHLORIDE 5 MILLIGRAM(S): 5 TABLET ORAL at 06:08

## 2020-12-12 RX ADMIN — SODIUM CHLORIDE 150 MILLILITER(S): 9 INJECTION, SOLUTION INTRAVENOUS at 04:46

## 2020-12-12 RX ADMIN — Medication 15 MILLIGRAM(S): at 06:08

## 2020-12-12 RX ADMIN — Medication 15 MILLIGRAM(S): at 00:01

## 2020-12-12 RX ADMIN — Medication 975 MILLIGRAM(S): at 06:38

## 2020-12-12 RX ADMIN — Medication 15 MILLIGRAM(S): at 06:38

## 2020-12-12 RX ADMIN — OXYCODONE HYDROCHLORIDE 10 MILLIGRAM(S): 5 TABLET ORAL at 13:00

## 2020-12-12 NOTE — PROGRESS NOTE ADULT - SUBJECTIVE AND OBJECTIVE BOX
LIZ ELAM    [id number]    History: Patient is status post right anterior total hip arthroplasty on 12/11/20, POD # 1. Patient is doing well and is comfortable. The patient's pain is controlled using the prescribed pain medications. The patient is participating in physical therapy. Denies nausea, vomiting, chest pain, shortness of breath, abdominal pain or fever. No new complaints.    Vital Signs Last 24 Hrs  T(C): 36.9 (12 Dec 2020 05:00), Max: 37.1 (11 Dec 2020 23:40)  T(F): 98.4 (12 Dec 2020 05:00), Max: 98.7 (11 Dec 2020 23:40)  HR: 79 (12 Dec 2020 05:00) (54 - 79)  BP: 119/78 (12 Dec 2020 05:00) (99/62 - 133/80)  BP(mean): --  RR: 18 (12 Dec 2020 05:00) (9 - 23)  SpO2: 96% (12 Dec 2020 05:00) (93% - 100%)                        10.5   15.53 )-----------( 304      ( 12 Dec 2020 06:19 )             33.5     12-12    137  |  106  |  7.0<L>  ----------------------------<  113<H>  4.7   |  24.0  |  0.64    Ca    9.0      12 Dec 2020 06:19    MEDICATIONS  (STANDING):  acetaminophen   Tablet .. 975 milliGRAM(s) Oral every 8 hours  aspirin enteric coated 325 milliGRAM(s) Oral two times a day  ketorolac   Injectable 15 milliGRAM(s) IV Push every 6 hours  lactated ringers. 1000 milliLiter(s) (150 mL/Hr) IV Continuous <Continuous>  metoprolol tartrate 25 milliGRAM(s) Oral two times a day  polyethylene glycol 3350 17 Gram(s) Oral daily  senna 2 Tablet(s) Oral at bedtime    MEDICATIONS  (PRN):  aluminum hydroxide/magnesium hydroxide/simethicone Suspension 30 milliLiter(s) Oral four times a day PRN Indigestion  HYDROmorphone   Tablet 4 milliGRAM(s) Oral every 3 hours PRN Severe Pain (7 - 10)  HYDROmorphone  Injectable 0.5 milliGRAM(s) IV Push every 3 hours PRN breakthrough  magnesium hydroxide Suspension 30 milliLiter(s) Oral at bedtime PRN Constipation  ondansetron Injectable 4 milliGRAM(s) IV Push every 6 hours PRN Nausea and/or Vomiting  oxyCODONE    IR 5 milliGRAM(s) Oral every 3 hours PRN Mild Pain (1 - 3)  oxyCODONE    IR 10 milliGRAM(s) Oral every 3 hours PRN Moderate Pain (4 - 6)    Physical exam: The right hip dressing is clean, dry and intact. No drainage or discharge. No erythema is noted. No blistering. No ecchymosis. The calf is supple nontender. Passive range of motion is acceptable to due postoperative pain. Sensation to light touch is grossly intact distally. The lateral cutaneous nerve is intact. Motor function distally is 5/5. No foot drop. 2+ dorsalis pedis pulse. Capillary refill is less than 2 seconds. No cyanosis.    Primary Orthopedic Assessment:  • s/p RIGHT ANTERIOR total hip replacement    Plan:   • DVT prophylaxis as prescribed, including use of compression devices and ankle pumps  • Continue physical therapy  • Weightbearing as tolerated of the right lower extremity with assistance of a walker  • Incentive spirometry encouraged  • Pain control as clinically indicated  • Anterior hip precautions   • Discharge planning – anticipated discharge is Home today when cleared by PT and Medicine

## 2020-12-12 NOTE — OCCUPATIONAL THERAPY INITIAL EVALUATION ADULT - LIVES WITH, PROFILE
children/Pt reports lives in private house with her 19 year old son who works but is available to assist as needed upon discharge. Pt also states she has other friends/family in the area to assist if needed. Pt has 4 steps to enter with bilateral hand rails and no steps inside to negotiate

## 2020-12-12 NOTE — OCCUPATIONAL THERAPY INITIAL EVALUATION ADULT - ASSISTIVE DEVICE FOR TOILET TRANSFER, REHAB EVAL
Pt reports owns commode from previous hip surgery; recommend continued use to increase safety and independence with transfer and ADL of toileting/commode/rolling walker

## 2020-12-12 NOTE — OCCUPATIONAL THERAPY INITIAL EVALUATION ADULT - SPECIAL TRAINING, OT EVAL
Pt ambulated with modified independence and RW, +external cues to maintain anterior THP around bed area, to/from the bathroom and in the hallway demonstrating good safety awareness and obstacle negotiation. Pt educated in energy conservation techniques including proper breathing and activity pacing.

## 2020-12-12 NOTE — PROGRESS NOTE ADULT - SUBJECTIVE AND OBJECTIVE BOX
Patient seen and examined . S/p R JESS   , POD # 1. Pain well controlled , no n/v , voiding without difficulties ,   participating with physical therapy .     CC : R hip chronic pain , post op pain well controlled         MEDICATIONS  (STANDING):  acetaminophen   Tablet .. 975 milliGRAM(s) Oral every 8 hours  aspirin enteric coated 325 milliGRAM(s) Oral two times a day  ketorolac   Injectable 15 milliGRAM(s) IV Push every 6 hours  lactated ringers. 1000 milliLiter(s) (150 mL/Hr) IV Continuous <Continuous>  metoprolol tartrate 25 milliGRAM(s) Oral two times a day  polyethylene glycol 3350 17 Gram(s) Oral daily  senna 2 Tablet(s) Oral at bedtime    MEDICATIONS  (PRN):  aluminum hydroxide/magnesium hydroxide/simethicone Suspension 30 milliLiter(s) Oral four times a day PRN Indigestion  HYDROmorphone   Tablet 4 milliGRAM(s) Oral every 3 hours PRN Severe Pain (7 - 10)  HYDROmorphone  Injectable 0.5 milliGRAM(s) IV Push every 3 hours PRN breakthrough  magnesium hydroxide Suspension 30 milliLiter(s) Oral at bedtime PRN Constipation  ondansetron Injectable 4 milliGRAM(s) IV Push every 6 hours PRN Nausea and/or Vomiting  oxyCODONE    IR 5 milliGRAM(s) Oral every 3 hours PRN Mild Pain (1 - 3)  oxyCODONE    IR 10 milliGRAM(s) Oral every 3 hours PRN Moderate Pain (4 - 6)      LABS:                          10.5   15.53 )-----------( 304      ( 12 Dec 2020 06:19 )             33.5     12-12    137  |  106  |  7.0<L>  ----------------------------<  113<H>  4.7   |  24.0  |  0.64    Ca    9.0      12 Dec 2020 06:19      RADIOLOGY & ADDITIONAL TESTS:  < from: Xray Hip w/ Pelvis 1 View, Right (12.11.20 @ 13:12) >     EXAM:  XR HIP WITH PELV 1V RT                          PROCEDURE DATE:  12/11/2020          INTERPRETATION:  Right hip and pelvis. 2 views.    Postop study shows a right hip replacement with good alignment. No bone destruction or fracture.    Pelvic view shows a symmetric left hip replacement.    IMPRESSION: New right hip replacement. Older left hip replacement.    MADDIE COCHRAN MD; Attending Radiologist  This document has been electronically signed. Dec 11 2020  2:18PM    < end of copied text >        REVIEW OF SYSTEMS:    As above , all other systems are reviewed and are negative .     Vital Signs Last 24 Hrs  T(C): 36.8 (12 Dec 2020 07:28), Max: 37.1 (11 Dec 2020 23:40)  T(F): 98.2 (12 Dec 2020 07:28), Max: 98.7 (11 Dec 2020 23:40)  HR: 57 (12 Dec 2020 07:28) (54 - 79)  BP: 98/57 (12 Dec 2020 07:28) (98/57 - 133/80)  BP(mean): --  RR: 19 (12 Dec 2020 07:28) (9 - 23)  SpO2: 98% (12 Dec 2020 07:28) (93% - 100%)    PHYSICAL EXAM:    GENERAL: NAD, well-groomed, well-developed  HEAD:  Atraumatic, Normocephalic  EYES: EOMI, PERRLA, conjunctiva and sclera clear  NECK: Supple, No JVD, Normal thyroid  NERVOUS SYSTEM:  Alert & Oriented X3, no focal deficit  CHEST/LUNG: CTA b/l ,  no  rales, rhonchi, wheezing, or rubs  HEART: Regular rate and rhythm; No murmurs, rubs, or gallops  ABDOMEN: Soft, Nontender, Nondistended; Bowel sounds present  EXTREMITIES:  2+ Peripheral Pulses, No clubbing, cyanosis, or edema, R hip dressing + , clean and dry   LYMPH: No lymphadenopathy noted  SKIN: No rashes or lesions

## 2020-12-12 NOTE — PROGRESS NOTE ADULT - ASSESSMENT
49 y/o female with OA s/p left JESS 7/2020, Asthma, Afib s/p loop recorder with no recorded Afib since 2018 when placed (on ASA), NSVT, benign brain meningioma 2011 presents with right hip pain [id number], worsening in last few months, with associated difficulty ambulating, controlled with Ibuprofen, now s/p right JESS POD #1 .     Right hip OA  S/p Right JESS POD   Pain control.  PT/OT.  Antibiotic, wound care and DVT prophylaxis as per Ortho.  Incentive spirometry.  Avoid opioid induced constipation.    Hx of Afib/NSVT with h/o loop recorder  Continue Metoprolol with parameters   No AC at home, Patient was taking ASA.  Currently NSR/SB 50-60.  Follows with cardiology and EP as outpatient.    Asthma  Patient reports no exacerbations in many years -  stable   Ventolin prn.    Prediabetes  A1c 5.7 , patient notified .   Recommend outpatient follow up with primary doctor.  ADA diet.    Problem / Plan - postoperative urinary retention - post op with 1100ml ( on PACU ) ,   s/p straight cath , voiding without difficulty since then , PVR X2 50 ml ,   asymptomatic this am - resolved     Problem / Plan - Leucocytosis - no S/S of infection - likely reactive     Acute blood lost anemia - secondary to surgical blood lost , NOT SIGNIFICANT -  asymptomatic.     DVT prophylaxis  ASA BID as per Ortho.  Dispo plan - Home .   D/W patient / nurse / ortho PA .    Medically stable to d/c once cleared by physical therapy / ortho .

## 2020-12-12 NOTE — OCCUPATIONAL THERAPY INITIAL EVALUATION ADULT - LEVEL OF INDEPENDENCE: DRESS LOWER BODY, OT EVAL
minimum assist (75% patients effort)/Pt able to independently don underwear and pants with additional time. Pt requires min A with socks/shoes

## 2020-12-12 NOTE — OCCUPATIONAL THERAPY INITIAL EVALUATION ADULT - ADDITIONAL COMMENTS
Pt has a shower with doors and grab bars. Pt owns a RW, cane and commode. Pt is right handed and drives. Pt was independent for IADLs but states son can assist while recovers.

## 2020-12-12 NOTE — OCCUPATIONAL THERAPY INITIAL EVALUATION ADULT - PHYSICAL ASSIST/NONPHYSICAL ASSIST:DRESS LOWER BODY, OT EVAL
Pt verbally educated in dressing devices however deferred further training stating her son is available to assist as needed as he did for previous surgery which she prefers.

## 2020-12-12 NOTE — OCCUPATIONAL THERAPY INITIAL EVALUATION ADULT - SENSORY TESTS
Patient with +capillary refill in Right toes. Patient +Right dorsal pedal pulse. Patient complains of some numbness in her Left hip at previous surgical site, no new complaints offered. RN made aware

## 2020-12-14 LAB — SARS-COV-2 N GENE NPH QL NAA+PROBE: NOT DETECTED

## 2020-12-21 ENCOUNTER — APPOINTMENT (OUTPATIENT)
Dept: ELECTROPHYSIOLOGY | Facility: CLINIC | Age: 50
End: 2020-12-21

## 2020-12-22 PROBLEM — J45.909 UNSPECIFIED ASTHMA, UNCOMPLICATED: Chronic | Status: ACTIVE | Noted: 2017-08-09

## 2020-12-22 PROBLEM — M16.11 UNILATERAL PRIMARY OSTEOARTHRITIS, RIGHT HIP: Chronic | Status: ACTIVE | Noted: 2020-12-01

## 2021-01-05 ENCOUNTER — APPOINTMENT (OUTPATIENT)
Dept: ORTHOPEDIC SURGERY | Facility: CLINIC | Age: 51
End: 2021-01-05
Payer: COMMERCIAL

## 2021-01-05 VITALS — HEIGHT: 68 IN | WEIGHT: 216 LBS | BODY MASS INDEX: 32.74 KG/M2

## 2021-01-05 DIAGNOSIS — Z96.649 PRESENCE OF UNSPECIFIED ARTIFICIAL HIP JOINT: ICD-10-CM

## 2021-01-05 PROCEDURE — 73502 X-RAY EXAM HIP UNI 2-3 VIEWS: CPT | Mod: RT

## 2021-01-05 PROCEDURE — 99024 POSTOP FOLLOW-UP VISIT: CPT

## 2021-01-05 NOTE — HISTORY OF PRESENT ILLNESS
[Doing Well] : is doing well [Excellent Pain Control] : has excellent pain control [No Sign of Infection] : is showing no signs of infection [Erythema] : not erythematous [Discharge] : absent of discharge [Dehiscence] : not dehisced [de-identified] : S/P Right DAA THR, DOS: 12/11/20. [de-identified] : The patient is a 50 year old female 3 weeks s/p right anterior THR. She is ambulating and transferring well without assistive devices. She has concluded home therapy at this time. For DVT prophylaxis she is on aspirin twice per day. Pain is controlled well with oxycodone and Tylenol. She continues Celebrex for prevention of heterotopic ossification. She denies systemic symptoms of fever or chills. She denies drainage from the incision site.  [de-identified] : Exam of the right hip shows a well healing incision. SLR without difficulty, hip flexion of 90 degrees, hip external rotation of 45 degrees. 5/5 motor strength bilaterally distally. Sensation intact distally. LFCN intact.  [de-identified] : Xray- AP pelvis and 2 views of the right hip shows a hip replacement in stable position, without sign of fracture or dislocation.  [de-identified] : The patient is doing very well 3 weeks after right anterior total hip replacement. The patient will be transitioned to outpatient physical therapy and a prescription was given for that. Celebrex was renewed. The patient will continue aspirin 325 mg twice per day for DVT prophylaxis for the next 3 weeks. Anterior hip precautions were reinforced. Overall the patient is very happy with their outcome so far. Followup in 3 weeks with repeat x-rays.

## 2021-01-05 NOTE — ADDENDUM
[FreeTextEntry1] : This note was authored by Martin Emanuel working as a medical scribe for Dr. Jacinto Castaneda. The note was reviewed, edited, and revised by Dr. Jacinto Castaneda whom is in agreement with the exam findings, imaging findings, and treatment plan. 01/05/2021.

## 2021-01-06 ENCOUNTER — APPOINTMENT (OUTPATIENT)
Dept: ELECTROPHYSIOLOGY | Facility: CLINIC | Age: 51
End: 2021-01-06
Payer: COMMERCIAL

## 2021-01-06 ENCOUNTER — APPOINTMENT (OUTPATIENT)
Dept: CARDIOLOGY | Facility: CLINIC | Age: 51
End: 2021-01-06
Payer: COMMERCIAL

## 2021-01-06 VITALS
HEART RATE: 70 BPM | TEMPERATURE: 96.9 F | BODY MASS INDEX: 32.74 KG/M2 | SYSTOLIC BLOOD PRESSURE: 110 MMHG | HEIGHT: 68 IN | DIASTOLIC BLOOD PRESSURE: 70 MMHG | OXYGEN SATURATION: 99 % | WEIGHT: 216 LBS

## 2021-01-06 PROCEDURE — 99213 OFFICE O/P EST LOW 20 MIN: CPT

## 2021-01-06 PROCEDURE — 99072 ADDL SUPL MATRL&STAF TM PHE: CPT

## 2021-01-06 PROCEDURE — 93298 REM INTERROG DEV EVAL SCRMS: CPT

## 2021-01-06 PROCEDURE — G2066: CPT

## 2021-01-06 NOTE — ASSESSMENT
[FreeTextEntry1] : Remote LNq SUmmary 1/6/21\par \par Viewed lnq summaries \par 10/24/20 to 11/28/20\par 11/28/20 to 1/2/21\par \par No events\par \par OV with RJ was r/s to today 1/6. Pending recommendations. \par Last recorded event was SVT x 20sec avg v rate 194 on 10/19/20. \par This correlated to patient feeling, rapid HR/dizziness and had to sit down/take deep breaths.\par On BB. \par \par Next summary 32d.

## 2021-01-10 NOTE — REVIEW OF SYSTEMS
[see HPI] : see HPI [Negative] : Heme/Lymph [Feeling Fatigued] : not feeling fatigued [Blurry Vision] : no blurred vision [Sore Throat] : no sore throat [Cough] : no cough [Abdominal Pain] : no abdominal pain [FreeTextEntry2] : cold feet

## 2021-01-10 NOTE — DISCUSSION/SUMMARY
[FreeTextEntry1] : The ILR was interrogated and there were no recent episodes of tachycardia either SVT or wide-complex.  No bradycardia or pauses were noted either.   The last episode of tachycardia occurred on 2/19/2020 lasting for 20 seconds and SVT at approximately 200 bpm.  Ablation was previously discussed but she does not want this option she will continue on   beta-blocker therapy.  The ILR was implanted December 2018 and she still has battery remaining for another 11- 12 months.  During this time we will see if she has any recurrence of tachycardia.  We will continue with remote monitoring.

## 2021-01-10 NOTE — HISTORY OF PRESENT ILLNESS
[FreeTextEntry1] : The patient is a 50-year-old male who is seen in follow-up evaluation for prior history of tachycardia/palpitations/SVT.  He has been doing well and denies any recent tachycardia episodes. She denies dizziness, chest pain, SOB. No syncope or presyncope.  She had prior left hip replacements last year and recently had a right hip replacement.\par She has an implantable loop monitor and previous recordings had shown nonsustained SVT with rates varying between 150 to 180 bpm and prior wide-complex tachycardia 4 beats.  The patient has been on metoprolol 25 mg a day time she takes it twice daily.  She has been under recent increased stress.\par \par History of asthma - not requiring treatment\par No HTN or DM\par ? Thyroid - hypo\par No sleep issues\par She is on metoprolol 25 mg qd .\par Evaluation:\par Exercise stress test 2/6/18: 10 METS, no ST abnormalities, no arrhythmia seen. 98% MPHR\par Echo done - verbal report : nl\par \par

## 2021-01-10 NOTE — PHYSICAL EXAM
[General Appearance - In No Acute Distress] : no acute distress [Normal Conjunctiva] : the conjunctiva exhibited no abnormalities [Normal Jugular Venous V Waves Present] : normal jugular venous V waves present [Respiration, Rhythm And Depth] : normal respiratory rhythm and effort [Auscultation Breath Sounds / Voice Sounds] : lungs were clear to auscultation bilaterally [Heart Rate And Rhythm] : heart rate and rhythm were normal [Heart Sounds] : normal S1 and S2 [Murmurs] : no murmurs present [Arterial Pulses Normal] : the arterial pulses were normal [Edema] : no peripheral edema present [Abdomen Tenderness] : non-tender [Abnormal Walk] : normal gait [Nail Clubbing] : no clubbing of the fingernails [Cyanosis, Localized] : no localized cyanosis [Impaired Insight] : insight and judgment were intact [] : no rash

## 2021-01-22 ENCOUNTER — APPOINTMENT (OUTPATIENT)
Dept: ORTHOPEDIC SURGERY | Facility: CLINIC | Age: 51
End: 2021-01-22
Payer: COMMERCIAL

## 2021-01-22 VITALS
BODY MASS INDEX: 32.74 KG/M2 | HEIGHT: 68 IN | WEIGHT: 216 LBS | DIASTOLIC BLOOD PRESSURE: 79 MMHG | HEART RATE: 65 BPM | SYSTOLIC BLOOD PRESSURE: 121 MMHG

## 2021-01-22 PROCEDURE — 73502 X-RAY EXAM HIP UNI 2-3 VIEWS: CPT | Mod: RT

## 2021-01-22 PROCEDURE — 99024 POSTOP FOLLOW-UP VISIT: CPT

## 2021-01-22 NOTE — ADDENDUM
[FreeTextEntry1] : This note was authored by Martin Emanuel working as a medical scribe for Dr. Jacinto Castaneda. The note was reviewed, edited, and revised by Dr. Jacinto Castaneda whom is in agreement with the exam findings, imaging findings, and treatment plan. 01/22/2021.

## 2021-01-22 NOTE — HISTORY OF PRESENT ILLNESS
[___ Weeks Post Op] : [unfilled] weeks post op [Xray (Date:___)] : [unfilled] Xray -  [Doing Well] : is doing well [Excellent Pain Control] : has excellent pain control [No Sign of Infection] : is showing no signs of infection [de-identified] : S/P Right DAA THR, DOS: 12/11/20. \par  [de-identified] : The patient is a 50 year old female 6 weeks s/p right anterior THR. She is ambulating and transferring well without assistive devices. She reports continuing outpatient physical therapy with good improvement of strength. She continues aspirin for DVT prophylaxis. She has returned to daily activities of life without significant pain or discomfort. Overall, she is very happy with the results of the surgery thus far.  [de-identified] : Exam of the right hip shows a well healed incision. Hip external rotation of 45 degrees, internal rotation of 10 degrees. 5/5 motor strength bilaterally distally. Sensation intact distally.  [de-identified] : Xray- AP pelvis and 2 views of the right hip shows a hip replacement in stable position, without sign of fracture or dislocation.  [de-identified] : The patient is doing very well 6 weeks following right anterior total hip replacement. Anterior hip precautions were reviewed and recommended to be followed for another 6 weeks. The patient will continue outpatient physical therapy and gradually transition to a home exercise program over the next 6 weeks. The patient may stop taking full strength aspirin at this point and return to her preoperative dose. Dental prophylaxis was reviewed. The patient is doing very well so far and overall very happy with their progress. Follow up in 6 weeks.\par \par

## 2021-01-28 NOTE — DISCHARGE NOTE ADULT - NS AS DC STROKE DX YN
O/N Events: OSIRIS  Subjective/ROS: Diffuse abdominal pain.  Patient sated feels like gas pain and do to her eating little. Patient feel nauseous no vomiting diarrhea, indigestion, fever, ns, or chills.  Patient had bowel movement last night.     VITALS  Vital Signs Last 24 Hrs  T(C): 36.8 (28 Jan 2021 10:07), Max: 36.9 (27 Jan 2021 20:35)  T(F): 98.2 (28 Jan 2021 10:07), Max: 98.4 (27 Jan 2021 20:35)  HR: 76 (28 Jan 2021 10:07) (71 - 82)  BP: 135/65 (28 Jan 2021 10:07) (118/73 - 135/65)  BP(mean): --  RR: 17 (28 Jan 2021 10:07) (16 - 18)  SpO2: 96% (28 Jan 2021 10:07) (93% - 96%)    CAPILLARY BLOOD GLUCOSE      POCT Blood Glucose.: 189 mg/dL (28 Jan 2021 11:46)  POCT Blood Glucose.: 147 mg/dL (28 Jan 2021 09:11)  POCT Blood Glucose.: 156 mg/dL (28 Jan 2021 07:58)  POCT Blood Glucose.: 152 mg/dL (27 Jan 2021 22:36)  POCT Blood Glucose.: 159 mg/dL (27 Jan 2021 17:43)      PHYSICAL EXAM  General: A&Ox3; NAD  Head: NC/AT; MMM; PERRL; EOMI;  Neck: Supple; no JVD  Respiratory: Wheezing in right upper lung field  Cardiovascular: Regular rhythm/rate; S1/S2   Gastrointestinal: Soft; NTND; normoactive BS  : [place] draining clear to yellow colored fluid  Extremities: WWP; no edema/cyanosis  Neurological:  CNII-XII grossly intact; no obvious focal deficits    MEDICATIONS  (STANDING):  albuterol/ipratropium for Nebulization 3 milliLiter(s) Nebulizer every 4 hours  ARIPiprazole 20 milliGRAM(s) Oral daily  cefTRIAXone   IVPB 2000 milliGRAM(s) IV Intermittent every 24 hours  dextrose 40% Gel 15 Gram(s) Oral once  dextrose 5%. 1000 milliLiter(s) (50 mL/Hr) IV Continuous <Continuous>  dextrose 50% Injectable 25 Gram(s) IV Push once  glucagon  Injectable 1 milliGRAM(s) IntraMuscular once  heparin   Injectable 7500 Unit(s) SubCutaneous every 8 hours  insulin lispro (ADMELOG) corrective regimen sliding scale   SubCutaneous Before meals and at bedtime  pantoprazole    Tablet 40 milliGRAM(s) Oral before breakfast  polyethylene glycol 3350 17 Gram(s) Oral daily  senna 2 Tablet(s) Oral at bedtime  sodium bicarbonate 650 milliGRAM(s) Oral every 12 hours  sodium chloride 1 Gram(s) Oral three times a day    MEDICATIONS  (PRN):  simethicone 80 milliGRAM(s) Chew daily PRN Gas      No Known Allergies      LABS                        10.1   12.02 )-----------( 179      ( 28 Jan 2021 05:50 )             29.6     01-28    128<L>  |  94<L>  |  68<H>  ----------------------------<  130<H>  4.0   |  19<L>  |  5.20<H>    Ca    9.0      28 Jan 2021 05:50  Phos  6.0     01-28  Mg     2.2     01-28    TPro  5.4<L>  /  Alb  2.1<L>  /  TBili  0.2  /  DBili  x   /  AST  102<H>  /  ALT  99<H>  /  AlkPhos  91  01-28        CARDIAC MARKERS ( 28 Jan 2021 05:50 )  x     / x     / 1644 U/L / x     / x      CARDIAC MARKERS ( 27 Jan 2021 09:13 )  x     / x     / 2720 U/L / x     / x            IMAGING/EKG/ETC  EKG:  Xray:  CT:  MRI: no

## 2021-02-11 ENCOUNTER — APPOINTMENT (OUTPATIENT)
Dept: CARDIOLOGY | Facility: CLINIC | Age: 51
End: 2021-02-11
Payer: COMMERCIAL

## 2021-02-11 PROCEDURE — G2066: CPT

## 2021-02-11 PROCEDURE — 93298 REM INTERROG DEV EVAL SCRMS: CPT

## 2021-02-12 ENCOUNTER — APPOINTMENT (OUTPATIENT)
Dept: ORTHOPEDIC SURGERY | Facility: CLINIC | Age: 51
End: 2021-02-12
Payer: COMMERCIAL

## 2021-02-12 VITALS
WEIGHT: 215 LBS | BODY MASS INDEX: 32.58 KG/M2 | SYSTOLIC BLOOD PRESSURE: 125 MMHG | DIASTOLIC BLOOD PRESSURE: 80 MMHG | HEIGHT: 68 IN

## 2021-02-12 DIAGNOSIS — Z47.1 AFTERCARE FOLLOWING JOINT REPLACEMENT SURGERY: ICD-10-CM

## 2021-02-12 DIAGNOSIS — Z96.659 PRESENCE OF UNSPECIFIED ARTIFICIAL KNEE JOINT: ICD-10-CM

## 2021-02-12 PROCEDURE — 99213 OFFICE O/P EST LOW 20 MIN: CPT | Mod: 24

## 2021-02-12 PROCEDURE — 99072 ADDL SUPL MATRL&STAF TM PHE: CPT

## 2021-02-12 PROCEDURE — 73502 X-RAY EXAM HIP UNI 2-3 VIEWS: CPT | Mod: RT

## 2021-02-12 NOTE — ASSESSMENT
[FreeTextEntry1] : Remote LNQ Summary\par \par 2-11-21 \par Viewed lnq summary 1-2-21 to 2-6-21 (no events were noted. ). \par Seen by Rj 1-6-21\par Next summary on pa schedule 32d-cv

## 2021-02-12 NOTE — ADDENDUM
[FreeTextEntry1] : This note was authored by Martin Emanuel working as a medical scribe for Dr. Jacinto Castaneda. The note was reviewed, edited, and revised by Dr. Jacinto Castaneda whom is in agreement with the exam findings, imaging findings, and treatment plan. 02/12/2021.

## 2021-02-12 NOTE — HISTORY OF PRESENT ILLNESS
[___ Weeks Post Op] : [unfilled] weeks post op [Doing Well] : is doing well [Excellent Pain Control] : has excellent pain control [No Sign of Infection] : is showing no signs of infection [Erythema] : not erythematous [Discharge] : absent of discharge [Dehiscence] : not dehisced [de-identified] : S/P Right DAA THR, DOS: 12/11/20. \par  [de-identified] : LIZ is a 50 year old female who presents today S/P Right DAA THR, 4 weeks ago. She reports on Febraury 1st falling while walking on snow and ice. She reports mild lumbar spine pain, but no significant groin pain at this time. She reports continuing outpatient physical therapy. She continues her postoperative course. Overall, she is very happy with the results of the surgery thus far. She comes in today for repeat evaluation following the fall.\par \par  [de-identified] : Exam of the right hip shows a well healing incision. Hip flexion of 90 degrees, hip external rotation of 40 degrees. 5/5 motor strength bilaterally distally. Sensation intact distally. LFCN intact.  [de-identified] : Xray- AP pelvis and 2 views of the right hip shows a hip replacement in stable position, without sign of fracture or dislocation.  [de-identified] : The patient is a 50 year old female 4 weeks s/p right anterior THR.  She fell and is having some right-sided lower back pain.  It is gradually improving.  Thankfully she did not injure the right hip.  She will continue outpatient physical therapy. Anterior hip precautions were reinforced and recommended to be followed for the next 4 weeks. Follow up in 4 weeks for repeat evaluation.

## 2021-03-11 ENCOUNTER — FORM ENCOUNTER (OUTPATIENT)
Age: 51
End: 2021-03-11

## 2021-03-16 ENCOUNTER — NON-APPOINTMENT (OUTPATIENT)
Age: 51
End: 2021-03-16

## 2021-03-16 ENCOUNTER — APPOINTMENT (OUTPATIENT)
Dept: CARDIOLOGY | Facility: CLINIC | Age: 51
End: 2021-03-16
Payer: COMMERCIAL

## 2021-03-16 PROCEDURE — G2066: CPT

## 2021-03-16 PROCEDURE — 93298 REM INTERROG DEV EVAL SCRMS: CPT

## 2021-03-19 ENCOUNTER — APPOINTMENT (OUTPATIENT)
Dept: ORTHOPEDIC SURGERY | Facility: CLINIC | Age: 51
End: 2021-03-19
Payer: COMMERCIAL

## 2021-03-19 PROCEDURE — 99213 OFFICE O/P EST LOW 20 MIN: CPT

## 2021-03-19 PROCEDURE — 73502 X-RAY EXAM HIP UNI 2-3 VIEWS: CPT | Mod: RT

## 2021-03-19 PROCEDURE — 99072 ADDL SUPL MATRL&STAF TM PHE: CPT

## 2021-03-19 NOTE — HISTORY OF PRESENT ILLNESS
[de-identified] : The patient is a 51 year old female 3 months s/p right anterior THR.She is ambulating and transferring well without assistive devices. She continues home strengthening exercises. She has returned to daily activities of life without significant discomfort. Overall, she is very happy with the results of the surgery.

## 2021-03-19 NOTE — PHYSICAL EXAM
[de-identified] : The patient appears well nourished  and in no apparent distress.  The patient is alert and oriented to person, place, and time.   Affect and mood appear normal. The head is normocephalic and atraumatic.  The eyes reveal normal sclera and extra ocular muscles are intact. The tongue is midline with no apparent lesions.  Skin shows normal turgor with no evidence of eczema or psoriasis.  No respiratory distress noted.  Sensation grossly intact.\par   [de-identified] : Exam of the right hip shows SLR with mild weakness, hip flexion of 100 degrees, hip external rotation of 50 degrees, internal rotation of 15 degrees.  5/5 motor strength bilaterally distally. Sensation intact distally. LFCN intact.  [de-identified] : Xray- AP pelvis and 2 views of the right hip shows a hip replacement in stable position, without sign of fracture or dislocation.

## 2021-03-19 NOTE — DISCUSSION/SUMMARY
[de-identified] : The patient is a 51 year old female 3 months s/p right anterior THR. She will continue home strengthening exercises. She could use more hip flexion strengthening.  Overall, she is very happy with the results of the surgery. Dental prophylaxis was advised. Follow up one year post op for radiographic surveillance.

## 2021-03-19 NOTE — ADDENDUM
[FreeTextEntry1] : This note was authored by Martin Emanuel working as a medical scribe for Dr. Jacinto Castaneda. The note was reviewed, edited, and revised by Dr. Jacinto Castaneda whom is in agreement with the exam findings, imaging findings, and treatment plan. 03/19/2021.

## 2021-04-06 ENCOUNTER — APPOINTMENT (OUTPATIENT)
Dept: ORTHOPEDIC SURGERY | Facility: CLINIC | Age: 51
End: 2021-04-06
Payer: COMMERCIAL

## 2021-04-06 VITALS
DIASTOLIC BLOOD PRESSURE: 87 MMHG | HEART RATE: 64 BPM | BODY MASS INDEX: 32.58 KG/M2 | HEIGHT: 68 IN | SYSTOLIC BLOOD PRESSURE: 141 MMHG | WEIGHT: 215 LBS

## 2021-04-06 DIAGNOSIS — M54.5 LOW BACK PAIN: ICD-10-CM

## 2021-04-06 DIAGNOSIS — D32.9 BENIGN NEOPLASM OF MENINGES, UNSPECIFIED: ICD-10-CM

## 2021-04-06 PROCEDURE — 99214 OFFICE O/P EST MOD 30 MIN: CPT

## 2021-04-06 PROCEDURE — 72100 X-RAY EXAM L-S SPINE 2/3 VWS: CPT

## 2021-04-06 PROCEDURE — 99072 ADDL SUPL MATRL&STAF TM PHE: CPT

## 2021-04-06 NOTE — HISTORY OF PRESENT ILLNESS
[de-identified] : 51 year old F presents for an initial evaluation after a slip and fall on ice, she states she landed on her right lower back and isolates pain over the right ileac crest, no radiculopathy complaints or motor deficits. She is S/p BL hip replacement and states she is doing well in regard to those. SALVADOR questionnaire negative. Patient was referred by Dr. Jacinto Castaneda.  [Ataxia] : no ataxia [Incontinence] : no incontinence [Loss of Dexterity] : good dexterity [Urinary Ret.] : no urinary retention

## 2021-04-06 NOTE — DISCUSSION/SUMMARY
[de-identified] : Patient declined PT due to the COVID pandemic.  Patient was instructed to start home exercises, core strengthening, cervical stretching, and a sheet was provided. We also spoke about the benefits of using heat, ice, and Bengay cream. I advised the patient on antiinflammatory use. Patient to follow up on a PRN basis.

## 2021-04-06 NOTE — ADDENDUM
[FreeTextEntry1] : Documented by Hugo Brambila acting as a scribe for Dr. Jeevan Patton on 04/06/2021. All medical record entries made by the Scribe were at my, Dr. Jeevan Patton, direction and personally dictated by me on 04/06/2021 . I have reviewed the chart and agree that the record accurately reflects my personal performance of the history, physical exam, assessment and plan. I have also personally directed, reviewed, and agreed with the chart.

## 2021-04-06 NOTE — PHYSICAL EXAM
[Poor Appearance] : well-appearing [Acute Distress] : not in acute distress [de-identified] : CONSTITUTIONAL: The patient is a very pleasant individual who is well-nourished and who appears stated age.\par PSYCHIATRIC: The patient is alert and oriented X 3 and in no apparent distress, and participates with orthopedic evaluation well.\par HEAD: Atraumatic and is nonsyndromic in appearance.\par EENT: No visible thyromegaly, EOMI.\par RESPIRATORY: Respiratory rate is regular, not dyspneic on examination.\par LYMPHATICS: There is no inguinal lymphadenopathy\par INTEGUMENTARY: Skin is clean, dry, and intact about the bilateral lower extremities and lumbar spine.\par VASCULAR: There is brisk capillary refill about the bilateral lower extremities.\par NEUROLOGIC: There are no pathologic reflexes. There is no decrease in sensation of the bilateral lower extremities on Wartenberg pinwheel examination. Deep tendon reflexes are well maintained at 2+/4 of the bilateral lower extremities and are symmetric.\par MUSCULOSKELETAL: There is no visible muscular atrophy. Manual motor strength is well maintained in the bilateral lower extremities.  The patient ambulates in a non-myelopathic manner. Negative tension sign and straight leg raise bilaterally. Quad extension, ankle dorsiflexion, EHL, plantar flexion, and ankle eversion are well preserved. Normal secondary orthopaedic exam of bilateral hips, greater trochanteric area, knees and ankles. Focal trigger point about the right lower lumbar spine, lumbar myositis, mechanically oriented lower back pain.  [de-identified] : Xray of the lumbar spine taken 04/06/2021 demonstrates lumbar degenerative disc disease and S/p BL hip replacement.

## 2021-04-23 ENCOUNTER — NON-APPOINTMENT (OUTPATIENT)
Age: 51
End: 2021-04-23

## 2021-04-26 ENCOUNTER — NON-APPOINTMENT (OUTPATIENT)
Age: 51
End: 2021-04-26

## 2021-04-26 ENCOUNTER — APPOINTMENT (OUTPATIENT)
Dept: CARDIOLOGY | Facility: CLINIC | Age: 51
End: 2021-04-26
Payer: COMMERCIAL

## 2021-04-26 PROCEDURE — G2066: CPT

## 2021-04-26 PROCEDURE — 93298 REM INTERROG DEV EVAL SCRMS: CPT

## 2021-05-18 ENCOUNTER — APPOINTMENT (OUTPATIENT)
Dept: ORTHOPEDIC SURGERY | Facility: CLINIC | Age: 51
End: 2021-05-18

## 2021-05-28 ENCOUNTER — APPOINTMENT (OUTPATIENT)
Dept: CARDIOLOGY | Facility: CLINIC | Age: 51
End: 2021-05-28

## 2021-05-28 ENCOUNTER — NON-APPOINTMENT (OUTPATIENT)
Age: 51
End: 2021-05-28

## 2021-07-06 ENCOUNTER — NON-APPOINTMENT (OUTPATIENT)
Age: 51
End: 2021-07-06

## 2021-07-06 ENCOUNTER — APPOINTMENT (OUTPATIENT)
Dept: CARDIOLOGY | Facility: CLINIC | Age: 51
End: 2021-07-06
Payer: COMMERCIAL

## 2021-07-06 PROCEDURE — G2066: CPT

## 2021-07-06 PROCEDURE — 93298 REM INTERROG DEV EVAL SCRMS: CPT

## 2021-07-07 ENCOUNTER — APPOINTMENT (OUTPATIENT)
Dept: ELECTROPHYSIOLOGY | Facility: CLINIC | Age: 51
End: 2021-07-07
Payer: COMMERCIAL

## 2021-07-07 VITALS
OXYGEN SATURATION: 97 % | DIASTOLIC BLOOD PRESSURE: 84 MMHG | HEART RATE: 69 BPM | HEIGHT: 68 IN | SYSTOLIC BLOOD PRESSURE: 134 MMHG | WEIGHT: 208 LBS | BODY MASS INDEX: 31.52 KG/M2

## 2021-07-07 PROCEDURE — 99213 OFFICE O/P EST LOW 20 MIN: CPT

## 2021-07-07 PROCEDURE — 99072 ADDL SUPL MATRL&STAF TM PHE: CPT

## 2021-07-07 RX ORDER — PANTOPRAZOLE 40 MG/1
40 TABLET, DELAYED RELEASE ORAL
Qty: 45 | Refills: 0 | Status: DISCONTINUED | COMMUNITY
Start: 2020-07-09 | End: 2021-07-07

## 2021-07-07 RX ORDER — CELECOXIB 200 MG/1
200 CAPSULE ORAL
Qty: 60 | Refills: 0 | Status: DISCONTINUED | COMMUNITY
Start: 2021-01-05 | End: 2021-07-07

## 2021-07-07 RX ORDER — ASPIRIN 325 MG/1
325 TABLET, FILM COATED ORAL DAILY
Refills: 0 | Status: DISCONTINUED | COMMUNITY
End: 2021-07-07

## 2021-07-07 NOTE — PHYSICAL EXAM
[General Appearance - In No Acute Distress] : no acute distress [Normal Conjunctiva] : the conjunctiva exhibited no abnormalities [Normal Jugular Venous V Waves Present] : normal jugular venous V waves present [Respiration, Rhythm And Depth] : normal respiratory rhythm and effort [Auscultation Breath Sounds / Voice Sounds] : lungs were clear to auscultation bilaterally [Heart Rate And Rhythm] : heart rate and rhythm were normal [Heart Sounds] : normal S1 and S2 [Murmurs] : no murmurs present [Arterial Pulses Normal] : the arterial pulses were normal [Edema] : no peripheral edema present [Abdomen Tenderness] : non-tender [Abnormal Walk] : normal gait [Nail Clubbing] : no clubbing of the fingernails [Cyanosis, Localized] : no localized cyanosis [] : no rash [Impaired Insight] : insight and judgment were intact

## 2021-07-25 NOTE — HISTORY OF PRESENT ILLNESS
[FreeTextEntry1] : \par \par Patient is a 51-year-old man who with a prior history of SVT/palpitations who has implantable loop monitor.  She has been doing well and has had very short episode and frequent of the rapid heartbeat.\par \par The implantable loop monitor was interrogated and since her last visit in January 2021 patient had 3 very short episodes she had to 1 episodes she will 19 around the same time of 23: 29 lasting 9 seconds and 31 seconds respectively with mean ventricular rate of 180 bpm.  She had another similar episode on April 8 at 1: 48 less than 20 seconds with a similar heart rate of 180 bpm.  These episodes appear to be in SVT/atrial tachycardia.  \par She had prior left hip replacements last year and recently had a right hip replacement.\par She has an implantable loop monitor and previous recordings had shown nonsustained SVT with rates varying between 150 to 180 bpm and prior wide-complex tachycardia 4 beats.  The patient has been on metoprolol 25 mg a day time she takes it twice daily.  She has been under recent increased stress.\par \par History of asthma - not requiring treatment\par No HTN or DM\par ? Thyroid - hypo\par No sleep issues\par She is on metoprolol 25 mg qd .\par Evaluation:\par Exercise stress test 2/6/18: 10 METS, no ST abnormalities, no arrhythmia seen. 98% MPHR\par Echo done - verbal report : nl\par \par

## 2021-07-25 NOTE — DISCUSSION/SUMMARY
[FreeTextEntry1] : The patient is doing well with very short episodes of tachycardia controlled with medications .  These episodes coincided with increase in stress based on her family situation.  We will continue to monitor remotely and  should we see episodes that are longer in duration  or if it becomes more frequent we would consider different medications or  catheter ablation. For now she can be managed conservatively with medications and changes in lifestyle.\par \par

## 2021-07-25 NOTE — REVIEW OF SYSTEMS
[Feeling Fatigued] : not feeling fatigued [Blurry Vision] : no blurred vision [Sore Throat] : no sore throat [Dyspnea on exertion] : not dyspnea during exertion [Palpitations] : no palpitations [Cough] : no cough [Abdominal Pain] : no abdominal pain [Easy Bleeding] : no tendency for easy bleeding

## 2021-08-04 NOTE — BRIEF OPERATIVE NOTE - SPECIMENS
Personalized Preventive Plan for Linden Valadez - 8/4/2021  Medicare offers a range of preventive health benefits. Some of the tests and screenings are paid in full while other may be subject to a deductible, co-insurance, and/or copay. Some of these benefits include a comprehensive review of your medical history including lifestyle, illnesses that may run in your family, and various assessments and screenings as appropriate. After reviewing your medical record and screening and assessments performed today your provider may have ordered immunizations, labs, imaging, and/or referrals for you. A list of these orders (if applicable) as well as your Preventive Care list are included within your After Visit Summary for your review. Other Preventive Recommendations:    · A preventive eye exam performed by an eye specialist is recommended every 1-2 years to screen for glaucoma; cataracts, macular degeneration, and other eye disorders. · A preventive dental visit is recommended every 6 months. · Try to get at least 150 minutes of exercise per week or 10,000 steps per day on a pedometer . · Order or download the FREE \"Exercise & Physical Activity: Your Everyday Guide\" from [employer]. Call 2-332.728.5634 or search [employer] online. · You need 7797-1067 mg of calcium and 4372-3235 IU of vitamin D per day. It is possible to meet your calcium requirement with diet alone, but a vitamin D supplement is usually necessary to meet this goal.  · When exposed to the sun, use a sunscreen that protects against both UVA and UVB radiation with an SPF of 30 or greater. Reapply every 2 to 3 hours or after sweating, drying off with a towel, or swimming. · Always wear a seat belt when traveling in a car. Always wear a helmet when riding a bicycle or motorcycle.
uterus, cx, tubes

## 2021-08-10 ENCOUNTER — NON-APPOINTMENT (OUTPATIENT)
Age: 51
End: 2021-08-10

## 2021-08-10 ENCOUNTER — APPOINTMENT (OUTPATIENT)
Dept: CARDIOLOGY | Facility: CLINIC | Age: 51
End: 2021-08-10
Payer: COMMERCIAL

## 2021-08-10 PROCEDURE — 93298 REM INTERROG DEV EVAL SCRMS: CPT

## 2021-08-10 PROCEDURE — G2066: CPT

## 2021-08-17 ENCOUNTER — NON-APPOINTMENT (OUTPATIENT)
Age: 51
End: 2021-08-17

## 2021-08-18 NOTE — DISCHARGE NOTE NURSING/CASE MANAGEMENT/SOCIAL WORK - CASE MANAGER'S NAME
[facility]
General Sunscreen Counseling: I recommended a broad spectrum sunscreen with a SPF of 30 or higher.  I explained that SPF 30 sunscreens block approximately 97 percent of the sun's harmful rays.  Sunscreens should be applied at least 15 minutes prior to expected sun exposure and then every 2 hours after that as long as sun exposure continues. If swimming or exercising sunscreen should be reapplied every 45 minutes to an hour after getting wet or sweating.  One ounce, or the equivalent of a shot glass full of sunscreen, is adequate to protect the skin not covered by a bathing suit. I also recommended a lip balm with a sunscreen as well. Sun protective clothing can be used in lieu of sunscreen but must be worn the entire time you are exposed to the sun's rays.
Detail Level: Zone

## 2021-09-08 ENCOUNTER — APPOINTMENT (OUTPATIENT)
Dept: ELECTROPHYSIOLOGY | Facility: CLINIC | Age: 51
End: 2021-09-08

## 2021-09-08 ENCOUNTER — APPOINTMENT (OUTPATIENT)
Dept: CARDIOLOGY | Facility: CLINIC | Age: 51
End: 2021-09-08

## 2021-09-14 ENCOUNTER — APPOINTMENT (OUTPATIENT)
Dept: CARDIOLOGY | Facility: CLINIC | Age: 51
End: 2021-09-14
Payer: COMMERCIAL

## 2021-09-14 ENCOUNTER — NON-APPOINTMENT (OUTPATIENT)
Age: 51
End: 2021-09-14

## 2021-09-14 PROCEDURE — 93298 REM INTERROG DEV EVAL SCRMS: CPT

## 2021-09-14 PROCEDURE — G2066: CPT

## 2021-09-22 ENCOUNTER — APPOINTMENT (OUTPATIENT)
Dept: ELECTROPHYSIOLOGY | Facility: CLINIC | Age: 51
End: 2021-09-22
Payer: COMMERCIAL

## 2021-09-22 DIAGNOSIS — Z95.818 PRESENCE OF OTHER CARDIAC IMPLANTS AND GRAFTS: ICD-10-CM

## 2021-09-22 PROCEDURE — 99442: CPT

## 2021-09-26 PROBLEM — Z95.818 STATUS POST PLACEMENT OF IMPLANTABLE LOOP RECORDER: Status: ACTIVE | Noted: 2018-12-19

## 2021-09-26 NOTE — REVIEW OF SYSTEMS
[Feeling Fatigued] : not feeling fatigued [Blurry Vision] : no blurred vision [Dyspnea on exertion] : not dyspnea during exertion [Palpitations] : palpitations [Syncope] : no syncope [Cough] : no cough [Abdominal Pain] : no abdominal pain [Dizziness] : no dizziness [Under Stress] : under stress [Easy Bleeding] : no tendency for easy bleeding

## 2021-09-26 NOTE — HISTORY OF PRESENT ILLNESS
[FreeTextEntry1] : The patient was evaluated by telephone visit. She has an implantable loop monitor and the results of the interrogation with data from 8- to 9- was provided. Patient consented to the visit and understood limitations including the absence of physical examination.\par \par Visit was because she has had recent episodes of palpitations. Patient claims that she has been under intense stress and anxiety result of an ongoing court case. The implantable loop monitor showed that she had 1: 11-minute of irregular SVT on 9-5-2021 at 1038. She felt the palpitations but had no associated chest pain, lightheadedness, dizziness or lightheadedness. She claims that she has had similar episodes infrequently in the past. This was noted on prior interrogations. She is currently on metoprolol 25 mg twice daily. She is tolerating the medication. Question on 9-5-20 she forgot to take a dose of metoprolol. She is also on aspirin 81 mg.\par \par Other than the stress and the rapid heartbeat her review of systems is otherwise unremarkable. She denied headaches, chest pain, visual symptoms, shortness of breath, dizziness, lightheadedness, syncope or presyncope.\par \par We discussed the treatment management which include stress reduction and possible increasing dose of her beta-blocker or switching to calcium channel blocker. We also rediscussed the role for catheter ablation. The patient's feels since this was in the setting of increased stress she would like to wait and ablation. The duration of these arrhythmias has been short. The patient will follow up with her primary care physician as well as cardiologist Dr. Lopez. She will get follow-up thyroid function test as well. We will continue to monitor her device remotely.

## 2021-09-26 NOTE — REASON FOR VISIT
[Arrhythmia/ECG Abnorrmalities] : arrhythmia/ECG abnormalities [Home] : at home, [unfilled] , at the time of the visit. [Medical Office: ([facility])___] : at the medical office located in  [Verbal consent obtained from patient] : the patient, [unfilled]

## 2021-10-20 ENCOUNTER — APPOINTMENT (OUTPATIENT)
Dept: CARDIOLOGY | Facility: CLINIC | Age: 51
End: 2021-10-20
Payer: COMMERCIAL

## 2021-10-20 ENCOUNTER — NON-APPOINTMENT (OUTPATIENT)
Age: 51
End: 2021-10-20

## 2021-10-20 PROCEDURE — G2066: CPT

## 2021-10-20 PROCEDURE — 93298 REM INTERROG DEV EVAL SCRMS: CPT

## 2021-11-23 ENCOUNTER — NON-APPOINTMENT (OUTPATIENT)
Age: 51
End: 2021-11-23

## 2021-11-24 ENCOUNTER — APPOINTMENT (OUTPATIENT)
Dept: CARDIOLOGY | Facility: CLINIC | Age: 51
End: 2021-11-24
Payer: COMMERCIAL

## 2021-11-24 PROCEDURE — G2066: CPT

## 2021-11-24 PROCEDURE — 93298 REM INTERROG DEV EVAL SCRMS: CPT | Mod: NC

## 2021-12-13 NOTE — DISCHARGE NOTE ADULT - NS AS DC DISCHARGE ACCOMPANY
Patient called stating she has been having vomitting since yesterday around 3:30p and having a headache. She has taken tylenol and its not helping. She is 13weeks pregnant with twins. Per [facility] patient is to go to [facility] and get a bag of chewy ice and favorite gatorade and sip on it all day to prevent dehydration. [facility] also wants patient to try zofran. Patient informed and voiced understanding.
Family

## 2021-12-28 ENCOUNTER — APPOINTMENT (OUTPATIENT)
Dept: ORTHOPEDIC SURGERY | Facility: CLINIC | Age: 51
End: 2021-12-28
Payer: COMMERCIAL

## 2021-12-28 ENCOUNTER — NON-APPOINTMENT (OUTPATIENT)
Age: 51
End: 2021-12-28

## 2021-12-28 VITALS — BODY MASS INDEX: 31.52 KG/M2 | WEIGHT: 208 LBS | HEIGHT: 68 IN

## 2021-12-28 PROCEDURE — 73502 X-RAY EXAM HIP UNI 2-3 VIEWS: CPT | Mod: RT

## 2021-12-28 PROCEDURE — 99213 OFFICE O/P EST LOW 20 MIN: CPT

## 2021-12-28 NOTE — PHYSICAL EXAM
[Normal] : Gait: normal [de-identified] : The patient appears well nourished  and in no apparent distress.  The patient is alert and oriented to person, place, and time.   Affect and mood appear normal. The head is normocephalic and atraumatic.  The eyes reveal normal sclera and extra ocular muscles are intact. The tongue is midline with no apparent lesions.  Skin shows normal turgor with no evidence of eczema or psoriasis.  No respiratory distress noted.  Sensation grossly intact.		  [de-identified] : Exam of the right hip shows a well healed incision,  hip flexion of 120 degrees, hip external rotation of 60 degrees, hip internal rotation of 15 degrees. There is no pain with range of motion. Patient can perform a straight leg raise.		  \par 5/5 motor strength bilaterally distally. Sensation intact distally.		 \par The patient walks with a normal gait.\par 		  [de-identified] :  X-ray: AP of the pelvis and 2 views of the right hip demonstrate a right total hip arthroplasty in stable position, with no evidence of fracture, loosening, or dislocation.

## 2021-12-28 NOTE — ADDENDUM
[FreeTextEntry1] : This note was authored by Arcenio Steward working as a medical scribe for Dr. Jacinto Castaneda. The note was reviewed, edited, and revised by Dr. Jacinto Castaneda whom is in agreement with the exam findings, imaging findings, and treatment plan. 12/28/2021

## 2021-12-28 NOTE — HISTORY OF PRESENT ILLNESS
[de-identified] : Patient is a 51-year-old female presenting for annual follow-up status post right total hip replacement 12/11/2020.  She is doing very well 1 year status post right hip replacement.  Patient is back to all normal activities without pain or restriction.  She takes Tylenol intermittently but admits this is more for her lower back as opposed to the hip.  She admits that she can notice the hip during damp and cold weather.  She denies any falls, fever, or chills.  She normally goes to therapy but is active with home exercise.  Overall she is very happy with her results 1 year from surgery.

## 2021-12-28 NOTE — REASON FOR VISIT
[Follow-Up Visit] : a follow-up visit for [Other: ____] : [unfilled] [FreeTextEntry2] : S/P Right DAA THR, DOS: 12/11/20.

## 2021-12-28 NOTE — DISCUSSION/SUMMARY
[de-identified] : The patient is doing very well 1 year following right total hip replacement. The patient will continue their home exercises. Overall the patient is very happy with the outcome of the surgery.  Dental prophylaxis was reviewed. Follow up in two years for radiographic surveillance.

## 2021-12-29 ENCOUNTER — APPOINTMENT (OUTPATIENT)
Dept: CARDIOLOGY | Facility: CLINIC | Age: 51
End: 2021-12-29
Payer: COMMERCIAL

## 2021-12-29 PROCEDURE — G2066: CPT

## 2021-12-29 PROCEDURE — 93298 REM INTERROG DEV EVAL SCRMS: CPT

## 2022-01-21 ENCOUNTER — NON-APPOINTMENT (OUTPATIENT)
Age: 52
End: 2022-01-21

## 2022-02-02 ENCOUNTER — APPOINTMENT (OUTPATIENT)
Dept: CARDIOLOGY | Facility: CLINIC | Age: 52
End: 2022-02-02
Payer: SELF-PAY

## 2022-02-02 ENCOUNTER — NON-APPOINTMENT (OUTPATIENT)
Age: 52
End: 2022-02-02

## 2022-02-02 PROCEDURE — 93298 REM INTERROG DEV EVAL SCRMS: CPT

## 2022-02-02 PROCEDURE — G2066: CPT

## 2022-03-09 ENCOUNTER — APPOINTMENT (OUTPATIENT)
Dept: CARDIOLOGY | Facility: CLINIC | Age: 52
End: 2022-03-09

## 2022-04-11 ENCOUNTER — APPOINTMENT (OUTPATIENT)
Dept: ELECTROPHYSIOLOGY | Facility: CLINIC | Age: 52
End: 2022-04-11

## 2022-08-21 NOTE — H&P PST ADULT - HEIGHT IN INCHES
Pt admitted to room 2192 in stable condition. ID bands matched x3 with Gisella MERRITT. HUGS/KISSES in place. Educational materials reviewed and placed at bedside. POC discussed, Pt and  agreeable. All questions answered. 8

## 2022-09-29 ENCOUNTER — OUTPATIENT (OUTPATIENT)
Dept: OUTPATIENT SERVICES | Facility: HOSPITAL | Age: 52
LOS: 1 days | End: 2022-09-29
Payer: MEDICAID

## 2022-09-29 ENCOUNTER — TRANSCRIPTION ENCOUNTER (OUTPATIENT)
Age: 52
End: 2022-09-29

## 2022-09-29 VITALS
TEMPERATURE: 98 F | RESPIRATION RATE: 20 BRPM | HEART RATE: 63 BPM | SYSTOLIC BLOOD PRESSURE: 113 MMHG | OXYGEN SATURATION: 100 % | DIASTOLIC BLOOD PRESSURE: 53 MMHG

## 2022-09-29 VITALS
SYSTOLIC BLOOD PRESSURE: 110 MMHG | RESPIRATION RATE: 20 BRPM | HEART RATE: 60 BPM | DIASTOLIC BLOOD PRESSURE: 70 MMHG | OXYGEN SATURATION: 100 %

## 2022-09-29 DIAGNOSIS — Z90.710 ACQUIRED ABSENCE OF BOTH CERVIX AND UTERUS: Chronic | ICD-10-CM

## 2022-09-29 DIAGNOSIS — D32.0 BENIGN NEOPLASM OF CEREBRAL MENINGES: Chronic | ICD-10-CM

## 2022-09-29 DIAGNOSIS — R00.2 PALPITATIONS: ICD-10-CM

## 2022-09-29 DIAGNOSIS — Z98.890 OTHER SPECIFIED POSTPROCEDURAL STATES: Chronic | ICD-10-CM

## 2022-09-29 DIAGNOSIS — Z98.891 HISTORY OF UTERINE SCAR FROM PREVIOUS SURGERY: Chronic | ICD-10-CM

## 2022-09-29 DIAGNOSIS — Z96.642 PRESENCE OF LEFT ARTIFICIAL HIP JOINT: Chronic | ICD-10-CM

## 2022-09-29 PROCEDURE — 33285 INSJ SUBQ CAR RHYTHM MNTR: CPT

## 2022-09-29 PROCEDURE — 33286 RMVL SUBQ CAR RHYTHM MNTR: CPT | Mod: 59

## 2022-09-29 PROCEDURE — 33286 RMVL SUBQ CAR RHYTHM MNTR: CPT

## 2022-09-29 PROCEDURE — C1764: CPT

## 2022-09-29 RX ORDER — FOLIC ACID 0.8 MG
0 TABLET ORAL
Qty: 0 | Refills: 0 | DISCHARGE

## 2022-09-29 NOTE — DISCHARGE NOTE PROVIDER - HOSPITAL COURSE
52 year old female patient with a longstanding history of recurrent palpitations x 3-4 years, ILR implanted in 2018, who is now s/p successful ILR exchange

## 2022-09-29 NOTE — ASU PATIENT PROFILE, ADULT - FALL HARM RISK - UNIVERSAL INTERVENTIONS
Bed in lowest position, wheels locked, appropriate side rails in place/Call bell, personal items and telephone in reach/Instruct patient to call for assistance before getting out of bed or chair/Non-slip footwear when patient is out of bed/[place] to call system/Physically safe environment - no spills, clutter or unnecessary equipment/Purposeful Proactive Rounding/Room/bathroom lighting operational, light cord in reach

## 2022-09-29 NOTE — H&P PST ADULT - HISTORY OF PRESENT ILLNESS
52 year old female patient with a longstanding history of recurrent palpitations x 3-4 years, ILR implanted in 2018, who presents with ILR at RRT. She was in the past reportedly found to have AF at [facility] (no strips available for review). The patient's ILR had 3 very short episodes of rapid tachycardia with rates in the 180s which may represent SVT/atrial tachycardia. In the past she was on beta blockers which she did not particulary tolerate. Today she feels well and offers no complaints.     Cardiac Summary:   Exercise stress test 2/6/18: 10 METS, no ST abnormalities, no arrhythmia seen. 98% MPHR  Echo - normal heart w/ normal EF.

## 2022-09-29 NOTE — DISCHARGE NOTE PROVIDER - NSDCMRMEDTOKEN_GEN_ALL_CORE_FT
acetaminophen 325 mg oral tablet: 3 tab(s) orally every 8 hours  aspirin 325 mg oral delayed release tablet: 1 tab(s) orally 2 times a day x 6weeks, then may resume home dose  celecoxib 200 mg oral capsule: 1 cap(s) orally 2 times a day  folic acid: orally once a day  metoprolol tartrate 25 mg oral tablet: 1 tab(s) orally 2 times a day  omeprazole 20 mg oral delayed release capsule: 1 cap(s) orally once a day   oxyCODONE 5 mg oral tablet: 1 tab(s) orally every 3 hours, As needed, Mild Pain (1 - 3)  Senna S 50 mg-8.6 mg oral tablet: 2 tab(s) orally once a day (at bedtime)    albuterol 0.63 mg/3 mL (0.021%) inhalation solution: inhaled 2 times a day as needed  B-12 1000 mcg oral tablet: 1 tab(s) orally once a day  iron sulfate: 65 milligram(s) orally once a day  lisinopril-hydrochlorothiazide 10 mg-12.5 mg oral tablet: 1 tab(s) orally once a day (at bedtime)  metoprolol tartrate 25 mg oral tablet: 1 tab(s) orally 2 times a day

## 2022-09-29 NOTE — DISCHARGE NOTE NURSING/CASE MANAGEMENT/SOCIAL WORK - PATIENT PORTAL LINK FT
You can access the FollowMyHealth Patient Portal offered by [facility] by registering at the following website: [email or web address]. By joining [facility]’s FollowMyHealth portal, you will also be able to view your health information using other applications (apps) compatible with our system.

## 2022-09-29 NOTE — DISCHARGE NOTE PROVIDER - NSDCFUSCHEDAPPT_GEN_ALL_CORE_FT
Julito Masterson  [facility]readmit  Scheduled Appointment: 09/29/2022    Julito Masterson  [facility]  [address]  Scheduled Appointment: 10/10/2022     Julito Masterson  [facility]  [facility] [address]  Scheduled Appointment: 10/10/2022

## 2022-09-29 NOTE — DISCHARGE NOTE PROVIDER - CARE PROVIDERS DIRECT ADDRESSES
[email or web address]

## 2022-09-29 NOTE — H&P PST ADULT - NSICDXPASTSURGICALHX_GEN_ALL_CORE_FT
PAST SURGICAL HISTORY:  Benign meningioma excision     H/O  section X1    H/O lumpectomy left   benign tumor    H/O: hysterectomy     History of total hip replacement, left 2020

## 2022-09-29 NOTE — H&P PST ADULT - ASSESSMENT
52 year old female patient with a longstanding history of recurrent palpitations x 3-4 years, ILR implanted in 2018, who presents for elective ILR exchange.     - COVID negative on 9/26/22

## 2022-09-29 NOTE — DISCHARGE NOTE PROVIDER - CARE PROVIDER_API CALL
Julito Masterson (MD)  Cardiac Electrophysiology; Cardiology; Internal Medicine  [address] [place] [id number]  Phone: [phone]  Fax: [phone]  Established Patient  Follow Up Time: 2 weeks   Julito Masterson)  Cardiac Electrophysiology; Cardiology; Internal Medicine  [address] [place] [id number]  Phone: [phone]  Fax: [phone]  Established Patient  Follow Up Time: 2 weeks    Abbe Tariq)  Cardiovascular Disease; Internal Medicine  [address] [facility], [address] [place] [address]  Phone: [phone]  Fax: [phone]  Established Patient  Follow Up Time: Routine

## 2022-09-29 NOTE — DISCHARGE NOTE NURSING/CASE MANAGEMENT/SOCIAL WORK - NSDCPEFALRISK_GEN_ALL_CORE
For information on Fall & Injury Prevention, visit: [email or web address] OR  [email or web address] OR  https://www.cdc.gov/steadi/patient.html
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
## 2022-09-29 NOTE — DISCHARGE NOTE PROVIDER - PROVIDER TOKENS
PROVIDER:[TOKEN:[[id number]],FOLLOWUP:[2 weeks],ESTABLISHEDPATIENT:[T]] PROVIDER:[TOKEN:[[id number]],FOLLOWUP:[2 weeks],ESTABLISHEDPATIENT:[T]],PROVIDER:[TOKEN:[[id number]],FOLLOWUP:[Routine],ESTABLISHEDPATIENT:[T]]

## 2022-10-10 ENCOUNTER — APPOINTMENT (OUTPATIENT)
Dept: ELECTROPHYSIOLOGY | Facility: CLINIC | Age: 52
End: 2022-10-10

## 2022-10-10 ENCOUNTER — NON-APPOINTMENT (OUTPATIENT)
Age: 52
End: 2022-10-10

## 2022-10-10 VITALS
SYSTOLIC BLOOD PRESSURE: 100 MMHG | HEIGHT: 68 IN | TEMPERATURE: 97.3 F | HEART RATE: 62 BPM | BODY MASS INDEX: 31.83 KG/M2 | WEIGHT: 210 LBS | DIASTOLIC BLOOD PRESSURE: 62 MMHG

## 2022-10-10 PROCEDURE — 99213 OFFICE O/P EST LOW 20 MIN: CPT

## 2022-10-14 NOTE — HISTORY OF PRESENT ILLNESS
[FreeTextEntry1] : Patient is a 52-year-old who is seen in follow-up status post recent ILR replacement.  She has a prior history of SVT/palpitation.  Patient has been doing well without any recurrence of symptoms.\par \par The ILR was interrogated today and there were no arrhythmias noted.\par \par She has no chest pain, shortness of breath, dizziness, lightheadedness, syncope or presyncope.\par Previous ILR interrogation: 2021: Patient had 3 very short episodes she had to 1 episodes she will 19 around the same time of 23: 29 lasting 9 seconds and 31 seconds respectively with mean ventricular rate of 180 bpm.  She had another similar episode on April 8 at 1: 48 less than 20 seconds with a similar heart rate of 180 bpm.  These episodes appear to be in SVT/atrial tachycardia.  \par She had prior left hip replacements last year and recently had a right hip replacement.\par She has an implantable loop monitor and previous recordings had shown nonsustained SVT with rates varying between 150 to 180 bpm and prior wide-complex tachycardia 4 beats.  \par \par The patient has been on metoprolol 25 mg a day time she takes it twice daily.  She has been under recent increased stress.\par \par History of asthma - not requiring treatment\par No HTN or DM\par ? Thyroid - hypo\par No sleep issues\par She is on metoprolol 25 mg qd .\par Evaluation:\par Exercise stress test 2/6/18: 10 METS, no ST abnormalities, no arrhythmia seen. 98% MPHR\par Echo done - verbal report : nl\par \par

## 2022-10-14 NOTE — REVIEW OF SYSTEMS
[Feeling Fatigued] : not feeling fatigued [Blurry Vision] : no blurred vision [Sore Throat] : no sore throat [Dyspnea on exertion] : not dyspnea during exertion [Palpitations] : no palpitations [Cough] : no cough [Abdominal Pain] : no abdominal pain [Under Stress] : under stress [Easy Bleeding] : no tendency for easy bleeding

## 2022-10-14 NOTE — DISCUSSION/SUMMARY
[FreeTextEntry1] : The patient is doing well on metoprolol therapy.  We will continue to monitor her ILR.  Her blood pressure is low normal range.  I encouraged her to increase her fluid intake.  She does not have any symptoms of dizziness or lightheadedness\par \par Recommend follow-up echocardiogram.\par \par Follow-up remote monitoring\par \par Follow-up with primary care as well as cardiology.

## 2022-10-14 NOTE — PHYSICAL EXAM
[No Acute Distress] : no acute distress [Normal Venous Pressure] : normal venous pressure [Normal S1, S2] : normal S1, S2 [Non Tender] : non-tender [No Edema] : no edema [de-identified] : Wound site ILR: Healing well [General Appearance - In No Acute Distress] : no acute distress [Normal Conjunctiva] : the conjunctiva exhibited no abnormalities [Normal Jugular Venous V Waves Present] : normal jugular venous V waves present [Respiration, Rhythm And Depth] : normal respiratory rhythm and effort [Auscultation Breath Sounds / Voice Sounds] : lungs were clear to auscultation bilaterally [Heart Rate And Rhythm] : heart rate and rhythm were normal [Heart Sounds] : normal S1 and S2 [Murmurs] : no murmurs present [Arterial Pulses Normal] : the arterial pulses were normal [Edema] : no peripheral edema present [Abdomen Tenderness] : non-tender [Abnormal Walk] : normal gait [Nail Clubbing] : no clubbing of the fingernails [Cyanosis, Localized] : no localized cyanosis [] : no rash [Impaired Insight] : insight and judgment were intact

## 2022-10-16 LAB — SARS-COV-2 N GENE NPH QL NAA+PROBE: NOT DETECTED

## 2022-11-10 ENCOUNTER — NON-APPOINTMENT (OUTPATIENT)
Age: 52
End: 2022-11-10

## 2022-11-10 ENCOUNTER — APPOINTMENT (OUTPATIENT)
Dept: CARDIOLOGY | Facility: CLINIC | Age: 52
End: 2022-11-10

## 2022-11-10 PROCEDURE — 93298 REM INTERROG DEV EVAL SCRMS: CPT

## 2022-11-10 PROCEDURE — G2066: CPT

## 2022-11-22 NOTE — ASU PREOP CHECKLIST - NS PREOP CHK HIBICLENS NA
COVID-19 positive TM intake for [employer] log received and reviewed.      Positive TM Added On [employer]SHA log    Date of Positive Test: 11/21/22  Date of Symptom Onset: Not Reported    OSHA INTAKE:      
#1:

## 2022-12-08 ENCOUNTER — APPOINTMENT (OUTPATIENT)
Dept: ORTHOPEDIC SURGERY | Facility: CLINIC | Age: 52
End: 2022-12-08

## 2022-12-15 ENCOUNTER — NON-APPOINTMENT (OUTPATIENT)
Age: 52
End: 2022-12-15

## 2022-12-15 ENCOUNTER — APPOINTMENT (OUTPATIENT)
Dept: CARDIOLOGY | Facility: CLINIC | Age: 52
End: 2022-12-15
Payer: MEDICAID

## 2022-12-15 PROCEDURE — G2066: CPT

## 2022-12-15 PROCEDURE — 93298 REM INTERROG DEV EVAL SCRMS: CPT

## 2022-12-15 NOTE — PHYSICAL THERAPY INITIAL EVALUATION ADULT - WEIGHT-BEARING RESTRICTIONS: STAND/SIT, REHAB EVAL
right LE/weight-bearing as tolerated Ivermectin Counseling:  Patient instructed to take medication on an empty stomach with a full glass of water.  Patient informed of potential adverse effects including but not limited to nausea, diarrhea, dizziness, itching, and swelling of the extremities or lymph nodes.  The patient verbalized understanding of the proper use and possible adverse effects of ivermectin.  All of the patient's questions and concerns were addressed.

## 2022-12-19 ENCOUNTER — NON-APPOINTMENT (OUTPATIENT)
Age: 52
End: 2022-12-19

## 2022-12-19 ENCOUNTER — EMERGENCY (EMERGENCY)
Facility: HOSPITAL | Age: 52
LOS: 1 days | Discharge: HOME | End: 2022-12-19
Attending: EMERGENCY MEDICINE
Payer: MEDICAID

## 2022-12-19 VITALS
TEMPERATURE: 98 F | RESPIRATION RATE: 18 BRPM | OXYGEN SATURATION: 98 % | HEART RATE: 65 BPM | DIASTOLIC BLOOD PRESSURE: 64 MMHG | SYSTOLIC BLOOD PRESSURE: 96 MMHG

## 2022-12-19 VITALS
WEIGHT: 210.1 LBS | TEMPERATURE: 98 F | HEIGHT: 68 IN | RESPIRATION RATE: 18 BRPM | OXYGEN SATURATION: 100 % | HEART RATE: 190 BPM

## 2022-12-19 DIAGNOSIS — Z98.891 HISTORY OF UTERINE SCAR FROM PREVIOUS SURGERY: Chronic | ICD-10-CM

## 2022-12-19 DIAGNOSIS — Z90.710 ACQUIRED ABSENCE OF BOTH CERVIX AND UTERUS: Chronic | ICD-10-CM

## 2022-12-19 DIAGNOSIS — D32.0 BENIGN NEOPLASM OF CEREBRAL MENINGES: Chronic | ICD-10-CM

## 2022-12-19 DIAGNOSIS — Z96.642 PRESENCE OF LEFT ARTIFICIAL HIP JOINT: Chronic | ICD-10-CM

## 2022-12-19 DIAGNOSIS — Z98.890 OTHER SPECIFIED POSTPROCEDURAL STATES: Chronic | ICD-10-CM

## 2022-12-19 LAB
ALBUMIN SERPL ELPH-MCNC: 4.2 G/DL — SIGNIFICANT CHANGE UP (ref 3.3–5.2)
ALP SERPL-CCNC: 68 U/L — SIGNIFICANT CHANGE UP (ref 40–120)
ALT FLD-CCNC: 12 U/L — SIGNIFICANT CHANGE UP
ANION GAP SERPL CALC-SCNC: 11 MMOL/L — SIGNIFICANT CHANGE UP (ref 5–17)
APTT BLD: 32.1 SEC — SIGNIFICANT CHANGE UP (ref 27.5–35.5)
AST SERPL-CCNC: 16 U/L — SIGNIFICANT CHANGE UP
BASOPHILS # BLD AUTO: 0.03 K/UL — SIGNIFICANT CHANGE UP (ref 0–0.2)
BASOPHILS NFR BLD AUTO: 0.5 % — SIGNIFICANT CHANGE UP (ref 0–2)
BILIRUB SERPL-MCNC: 0.4 MG/DL — SIGNIFICANT CHANGE UP (ref 0.4–2)
BUN SERPL-MCNC: 18.3 MG/DL — SIGNIFICANT CHANGE UP (ref 8–20)
CALCIUM SERPL-MCNC: 8.9 MG/DL — SIGNIFICANT CHANGE UP (ref 8.4–10.5)
CHLORIDE SERPL-SCNC: 105 MMOL/L — SIGNIFICANT CHANGE UP (ref 96–108)
CO2 SERPL-SCNC: 25 MMOL/L — SIGNIFICANT CHANGE UP (ref 22–29)
CREAT SERPL-MCNC: 1.11 MG/DL — SIGNIFICANT CHANGE UP (ref 0.5–1.3)
EGFR: 60 ML/MIN/1.73M2 — SIGNIFICANT CHANGE UP
EOSINOPHIL # BLD AUTO: 0.06 K/UL — SIGNIFICANT CHANGE UP (ref 0–0.5)
EOSINOPHIL NFR BLD AUTO: 1 % — SIGNIFICANT CHANGE UP (ref 0–6)
FLUAV AG NPH QL: SIGNIFICANT CHANGE UP
FLUBV AG NPH QL: SIGNIFICANT CHANGE UP
GLUCOSE SERPL-MCNC: 94 MG/DL — SIGNIFICANT CHANGE UP (ref 70–99)
HCG SERPL-ACNC: <4 MIU/ML — SIGNIFICANT CHANGE UP
HCT VFR BLD CALC: 38.4 % — SIGNIFICANT CHANGE UP (ref 34.5–45)
HGB BLD-MCNC: 12.3 G/DL — SIGNIFICANT CHANGE UP (ref 11.5–15.5)
IMM GRANULOCYTES NFR BLD AUTO: 0.2 % — SIGNIFICANT CHANGE UP (ref 0–0.9)
INR BLD: 1.09 RATIO — SIGNIFICANT CHANGE UP (ref 0.88–1.16)
LYMPHOCYTES # BLD AUTO: 1.78 K/UL — SIGNIFICANT CHANGE UP (ref 1–3.3)
LYMPHOCYTES # BLD AUTO: 29.4 % — SIGNIFICANT CHANGE UP (ref 13–44)
MAGNESIUM SERPL-MCNC: 1.9 MG/DL — SIGNIFICANT CHANGE UP (ref 1.8–2.6)
MCHC RBC-ENTMCNC: 27.1 PG — SIGNIFICANT CHANGE UP (ref 27–34)
MCHC RBC-ENTMCNC: 32 GM/DL — SIGNIFICANT CHANGE UP (ref 32–36)
MCV RBC AUTO: 84.6 FL — SIGNIFICANT CHANGE UP (ref 80–100)
MONOCYTES # BLD AUTO: 0.6 K/UL — SIGNIFICANT CHANGE UP (ref 0–0.9)
MONOCYTES NFR BLD AUTO: 9.9 % — SIGNIFICANT CHANGE UP (ref 2–14)
NEUTROPHILS # BLD AUTO: 3.58 K/UL — SIGNIFICANT CHANGE UP (ref 1.8–7.4)
NEUTROPHILS NFR BLD AUTO: 59 % — SIGNIFICANT CHANGE UP (ref 43–77)
PLATELET # BLD AUTO: 312 K/UL — SIGNIFICANT CHANGE UP (ref 150–400)
POTASSIUM SERPL-MCNC: 3.7 MMOL/L — SIGNIFICANT CHANGE UP (ref 3.5–5.3)
POTASSIUM SERPL-SCNC: 3.7 MMOL/L — SIGNIFICANT CHANGE UP (ref 3.5–5.3)
PROT SERPL-MCNC: 7.2 G/DL — SIGNIFICANT CHANGE UP (ref 6.6–8.7)
PROTHROM AB SERPL-ACNC: 12.6 SEC — SIGNIFICANT CHANGE UP (ref 10.5–13.4)
RBC # BLD: 4.54 M/UL — SIGNIFICANT CHANGE UP (ref 3.8–5.2)
RBC # FLD: 13.6 % — SIGNIFICANT CHANGE UP (ref 10.3–14.5)
RSV RNA NPH QL NAA+NON-PROBE: SIGNIFICANT CHANGE UP
SARS-COV-2 RNA SPEC QL NAA+PROBE: SIGNIFICANT CHANGE UP
SODIUM SERPL-SCNC: 141 MMOL/L — SIGNIFICANT CHANGE UP (ref 135–145)
T4 AB SER-ACNC: 5.8 UG/DL — SIGNIFICANT CHANGE UP (ref 4.5–12)
TROPONIN T SERPL-MCNC: <0.01 NG/ML — SIGNIFICANT CHANGE UP (ref 0–0.06)
TSH SERPL-MCNC: 1.9 UIU/ML — SIGNIFICANT CHANGE UP (ref 0.27–4.2)
WBC # BLD: 6.06 K/UL — SIGNIFICANT CHANGE UP (ref 3.8–10.5)
WBC # FLD AUTO: 6.06 K/UL — SIGNIFICANT CHANGE UP (ref 3.8–10.5)

## 2022-12-19 PROCEDURE — 84702 CHORIONIC GONADOTROPIN TEST: CPT

## 2022-12-19 PROCEDURE — 85730 THROMBOPLASTIN TIME PARTIAL: CPT

## 2022-12-19 PROCEDURE — 84443 ASSAY THYROID STIM HORMONE: CPT

## 2022-12-19 PROCEDURE — 80053 COMPREHEN METABOLIC PANEL: CPT

## 2022-12-19 PROCEDURE — 84484 ASSAY OF TROPONIN QUANT: CPT

## 2022-12-19 PROCEDURE — 85610 PROTHROMBIN TIME: CPT

## 2022-12-19 PROCEDURE — 83735 ASSAY OF MAGNESIUM: CPT

## 2022-12-19 PROCEDURE — 87637 SARSCOV2&INF A&B&RSV AMP PRB: CPT

## 2022-12-19 PROCEDURE — 85025 COMPLETE CBC W/AUTO DIFF WBC: CPT

## 2022-12-19 PROCEDURE — 36415 COLL VENOUS BLD VENIPUNCTURE: CPT

## 2022-12-19 PROCEDURE — 99291 CRITICAL CARE FIRST HOUR: CPT

## 2022-12-19 PROCEDURE — 84436 ASSAY OF TOTAL THYROXINE: CPT

## 2022-12-19 RX ORDER — METOPROLOL TARTRATE 50 MG
50 TABLET ORAL
Refills: 0 | Status: DISCONTINUED | OUTPATIENT
Start: 2022-12-19 | End: 2022-12-27

## 2022-12-19 NOTE — ED PROVIDER NOTE - PATIENT PORTAL LINK FT
You can access the FollowMyHealth Patient Portal offered by [facility] by registering at the following website: [email or web address]. By joining [facility]’s FollowMyHealth portal, you will also be able to view your health information using other applications (apps) compatible with our system.

## 2022-12-19 NOTE — ED PROVIDER NOTE - ATTENDING CONTRIBUTION TO CARE
I, Arabella Gonzalez DO, have personally provided 60 minutes of critical care time exclusive of time spent on separately billable procedures. Time includes review of laboratory data, radiology results, discussion with consultants, and monitoring for potential decompensation. Interventions were performed as documented above.     I personally saw the patient with the resident, and completed the key components of the history and physical exam. I then discussed the management plan with the resident.    53 y/o F with PMH SVT, ILR under the care of EP physician at [facility]  presents for tachycardia, initial EKG with HR in 190's, appears to be SVT - patient tremulous, with SOB and chest pressure. While talking patient broke to NSR, repeat EKG showed normal sinus without ischemia. Patient cleared by EP and cardiology - can follow up outpatient.

## 2022-12-19 NOTE — ED ADULT NURSE REASSESSMENT NOTE - NS ED NURSE REASSESS COMMENT FT1
report received from kyrie lazo at 15:35. rr even and unlabored. pt contiues to be on cardiac monitoring. anox4. denies chest pain or sob at this time. rr even and unlabored. denies dizziness. iv intact. pt educated on plan of care, pt able to successfully teach back plan of care to RN, RN will continue to reeducate pt during hospital stay.

## 2022-12-19 NOTE — ED PROVIDER NOTE - PROGRESS NOTE DETAILS
spoke w/ patient and informed her of results. informed her of recommendations to increase metoprolol to 50mg BID as instructed by her electrophysiologist team.

## 2022-12-19 NOTE — ED PROVIDER NOTE - PHYSICAL EXAMINATION
General: Well appearing female in no acute distress  HEENT: Normocephalic, atraumatic. Moist mucous membranes. Oropharynx clear. No lymphadenopathy.  Eyes: No scleral icterus. EOMI. DMITRIY.  Neck:. Soft and supple. Full ROM without pain. No midline tenderness  Cardiac: Regular rate and regular rhythm. No murmurs, rubs, gallops. Peripheral pulses 2+ and symmetric. No LE edema.  Resp: Lungs CTAB. Speaking in full sentences. No wheezes, rales or rhonchi.  Abd: Soft, non-tender, non-distended. No guarding or rebound. No scars, masses, or lesions.  Back: Spine midline and non-tender. No CVA tenderness.    Skin: No rashes, abrasions, or lacerations.  Neuro: AO x 3. Moves all extremities symmetrically. Motor strength and sensation grossly intact.
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
## 2022-12-19 NOTE — ED PROVIDER NOTE - CLINICAL SUMMARY MEDICAL DECISION MAKING FREE TEXT BOX
51 y/o F hx of svt, loop recorder presents for chest pain for the past 1 day. endorsing palpitations as well. states that she has had current loop recorder for past 3 months, follows wJg taylor.   tachycardic 180s on arrival, appears to be SVT. patient returned to sinus rhythm w/o any acute intervention. will get EP and cards consult, labs, cardiac monitor.

## 2022-12-19 NOTE — ED PROVIDER NOTE - OBJECTIVE STATEMENT
53 y/o F hx of svt, loop recorder presents for chest pain for the past 1 day. endorsing palpitations as well. states that she has had current loop recorder for past 3 months, follows hair [facility] through [facility]. endorses taking metoprolol at home. denies fever/chills. denies nausea/vomiting. denies abdominal pain. denies drug use.

## 2022-12-19 NOTE — ED ADULT TRIAGE NOTE - CHIEF COMPLAINT QUOTE
pt states she has a loop recorder and was told to go to the ED, states she was told she is having an episode  c/o CP, rapid heartbeat, jaw pain, diaphoretic  A&Ox3, resp wnl, pt sent to critical

## 2022-12-19 NOTE — ED ADULT NURSE NOTE - OBJECTIVE STATEMENT
Patient presented to ED complaining of chest pain. Patient  has a loop recorder and was told to go to the ED d/t having an episode. Patient AXOX4.  Patient received in critical and went back to NS[facility] on her own with no intervention needed. Labs sent as ordered. No distress noted at this time.

## 2022-12-19 NOTE — ED PROVIDER NOTE - NSFOLLOWUPINSTRUCTIONS_ED_ALL_ED_FT
Followup with your cardiologist, dr. Vila in the next 7 days. Return to the emergency department if symptoms worsen. Increase your metoprolol to 50mg twice per day as instructed by the electrophysiologist team.     Palpitations    A palpitation is the feeling that your heartbeat is irregular or is faster than normal. It may feel like your heart is fluttering or skipping a beat. They may be caused by many things, including smoking, caffeine, alcohol, stress, and certain medicines. Although most causes of palpitations are not serious, palpitations can be a sign of a serious medical problem. Avoid caffeine, alcohol, and tobacco products at home. Try to reduce stress and anxiety and make sure to get plenty of rest.     SEEK IMMEDIATE MEDICAL CARE IF YOU HAVE ANY OF THE FOLLOWING SYMPTOMS: chest pain, shortness of breath, severe headache, dizziness/lightheadedness, or fainting.

## 2022-12-19 NOTE — ED ADULT NURSE NOTE - ABDOMEN
PROVIDE ADEQUATE OXYGENATION WITH ACCEPTABLE SP02/ABG'S    [x]  IDENTIFY APPROPRIATE OXYGEN THERAPY  [x]   MONITOR SP02/ABG'S AS NEEDED   [x]   PATIENT EDUCATION AS NEEDED    BRONCHOSPASM/BRONCHOCONSTRICTION     [x]         IMPROVE AERATION/BREATH SOUNDS  [x]   ADMINISTER BRONCHODILATOR THERAPY AS APPROPRIATE  [x]   ASSESS BREATH SOUNDS  []   IMPLEMENT AEROSOL/MDI PROTOCOL  [x]   PATIENT EDUCATION AS NEEDED soft/nondistended

## 2022-12-29 ENCOUNTER — APPOINTMENT (OUTPATIENT)
Dept: ORTHOPEDIC SURGERY | Facility: CLINIC | Age: 52
End: 2022-12-29
Payer: MEDICAID

## 2022-12-29 VITALS
BODY MASS INDEX: 31.83 KG/M2 | HEIGHT: 68 IN | DIASTOLIC BLOOD PRESSURE: 75 MMHG | HEART RATE: 61 BPM | WEIGHT: 210 LBS | SYSTOLIC BLOOD PRESSURE: 115 MMHG

## 2022-12-29 DIAGNOSIS — Z96.642 PRESENCE OF LEFT ARTIFICIAL HIP JOINT: ICD-10-CM

## 2022-12-29 DIAGNOSIS — Z96.641 PRESENCE OF RIGHT ARTIFICIAL HIP JOINT: ICD-10-CM

## 2022-12-29 PROCEDURE — 99213 OFFICE O/P EST LOW 20 MIN: CPT

## 2022-12-29 PROCEDURE — 73521 X-RAY EXAM HIPS BI 2 VIEWS: CPT

## 2022-12-29 NOTE — REASON FOR VISIT
[Follow-Up Visit] : a follow-up visit for [Other: ____] : [unfilled] [FreeTextEntry2] : S/P Right DAA THR, DOS: 12/11/20. \par S/P Left DAA THR, DOS: 7/8/20.

## 2022-12-29 NOTE — HISTORY OF PRESENT ILLNESS
[de-identified] : Ms. LIZ ELAM is a 52 year old female s/p Right THR 12/11/20, s/p left THR 7/8/20, presenting for annual follow up.  Patient is doing very well 2 years following bilateral hip replacements.  She is back to all normal activities.  She reports very infrequent left hip pain particularly with cold damp weather.  She denies any falls or trauma.  She states that this pain resolves on its own every time.  She not currently taking any medications for pain.  She is no longer in physical therapy but is active with home exercise.

## 2022-12-29 NOTE — DISCUSSION/SUMMARY
[de-identified] : Patient is a 52-year-old female doing very well 2 years following bilateral total hip replacements.  I have reassured her that based on exam and x-ray findings the hips are mechanically stable and doing very well.  In the event of intermittent pain patient may use over-the-counter anti-inflammatories and Tylenol.  She denies need for prescription anti-inflammatories at this time.  She denies need for physical therapy.  Overall she is doing very well.  Follow-up recommended in 2 years unless needed sooner.

## 2022-12-29 NOTE — PHYSICAL EXAM
[de-identified] : Multi body exam \par The patient appears well nourished and in no apparent distress. The patient is alert and oriented to person, place, and time. Affect and mood appear normal. The head is normocephalic and atraumatic. The eyes reveal normal sclera and extra ocular muscles are intact. The tongue is midline with no apparent lesions. Skin shows normal turgor with no evidence of eczema or psoriasis. No respiratory distress noted. Sensation grossly intact.\par   [de-identified] : Exam left hip: Skin reveals well-healed incision.  SLR evaluated smooth and intact.  Flexion to 100 degrees, external rotation to 45 degrees, internal rotation to 25 degrees, all without pain.\par Exam right hip: Skin reveals well-healed incision.  SLR evaluated smooth and intact.  Flexion to 100 degrees, external rotation to 45 degrees, internal rotation to 25 degrees, all without pain. [de-identified] : X-ray 2 views bilateral hip and AP pelvis demonstrate well fixed and aligned Bilateral total hip replacement without signs of loosening or fracture.

## 2023-01-19 ENCOUNTER — NON-APPOINTMENT (OUTPATIENT)
Age: 53
End: 2023-01-19

## 2023-01-19 ENCOUNTER — APPOINTMENT (OUTPATIENT)
Dept: CARDIOLOGY | Facility: CLINIC | Age: 53
End: 2023-01-19
Payer: MEDICAID

## 2023-01-19 PROCEDURE — 93298 REM INTERROG DEV EVAL SCRMS: CPT

## 2023-01-19 PROCEDURE — G2066: CPT

## 2023-02-07 ENCOUNTER — APPOINTMENT (OUTPATIENT)
Dept: ORTHOPEDIC SURGERY | Facility: CLINIC | Age: 53
End: 2023-02-07

## 2023-02-07 DIAGNOSIS — M47.816 SPONDYLOSIS W/OUT MYELOPATHY OR RADICULOPATHY, LUMBAR REGION: ICD-10-CM

## 2023-02-23 ENCOUNTER — APPOINTMENT (OUTPATIENT)
Dept: CARDIOLOGY | Facility: CLINIC | Age: 53
End: 2023-02-23
Payer: MEDICAID

## 2023-02-23 ENCOUNTER — NON-APPOINTMENT (OUTPATIENT)
Age: 53
End: 2023-02-23

## 2023-02-23 PROCEDURE — G2066: CPT

## 2023-02-23 PROCEDURE — 93298 REM INTERROG DEV EVAL SCRMS: CPT

## 2023-03-07 ENCOUNTER — OUTPATIENT (OUTPATIENT)
Dept: OUTPATIENT SERVICES | Facility: HOSPITAL | Age: 53
LOS: 1 days | End: 2023-03-07
Payer: MEDICAID

## 2023-03-07 DIAGNOSIS — Z96.642 PRESENCE OF LEFT ARTIFICIAL HIP JOINT: Chronic | ICD-10-CM

## 2023-03-07 DIAGNOSIS — I47.1 SUPRAVENTRICULAR TACHYCARDIA: ICD-10-CM

## 2023-03-07 DIAGNOSIS — R07.9 CHEST PAIN, UNSPECIFIED: ICD-10-CM

## 2023-03-07 DIAGNOSIS — Z98.890 OTHER SPECIFIED POSTPROCEDURAL STATES: Chronic | ICD-10-CM

## 2023-03-07 DIAGNOSIS — D32.0 BENIGN NEOPLASM OF CEREBRAL MENINGES: Chronic | ICD-10-CM

## 2023-03-07 DIAGNOSIS — Z90.710 ACQUIRED ABSENCE OF BOTH CERVIX AND UTERUS: Chronic | ICD-10-CM

## 2023-03-07 DIAGNOSIS — Z98.891 HISTORY OF UTERINE SCAR FROM PREVIOUS SURGERY: Chronic | ICD-10-CM

## 2023-03-07 PROCEDURE — A9500: CPT

## 2023-03-07 PROCEDURE — 93016 CV STRESS TEST SUPVJ ONLY: CPT

## 2023-03-07 PROCEDURE — 78452 HT MUSCLE IMAGE SPECT MULT: CPT

## 2023-03-07 PROCEDURE — 93018 CV STRESS TEST I&R ONLY: CPT

## 2023-03-07 PROCEDURE — 78452 HT MUSCLE IMAGE SPECT MULT: CPT | Mod: 26

## 2023-03-07 PROCEDURE — 93017 CV STRESS TEST TRACING ONLY: CPT

## 2023-03-13 ENCOUNTER — APPOINTMENT (OUTPATIENT)
Dept: ELECTROPHYSIOLOGY | Facility: CLINIC | Age: 53
End: 2023-03-13
Payer: MEDICAID

## 2023-03-13 VITALS
SYSTOLIC BLOOD PRESSURE: 100 MMHG | OXYGEN SATURATION: 98 % | TEMPERATURE: 98.6 F | WEIGHT: 212 LBS | HEART RATE: 60 BPM | HEIGHT: 68 IN | BODY MASS INDEX: 32.13 KG/M2 | DIASTOLIC BLOOD PRESSURE: 60 MMHG

## 2023-03-13 PROCEDURE — 99214 OFFICE O/P EST MOD 30 MIN: CPT

## 2023-03-16 NOTE — HISTORY OF PRESENT ILLNESS
[FreeTextEntry1] : Patient was hospitalized at [facility] December 2022 with episode of tachycardia.\par \par She has been doing well recently but has short episodes of fluttering.\par \par She is currently on aspirin and metoprolol 50 mg twice daily.\par \par Her implantable loop monitor was interrogated and she has not had any episodes since hospital discharge.  She does have short fluttering episodes which is not recorded on the monitor.\par The patient had a nuclear stress test performed at [facility] on March 7/2023.  She exercised to 95% of her MPHR with 9 minutes had no symptoms and nondiagnostic EKG.  She had a normal stress test.\par \par status post recent ILR replacement.  She has a prior history of SVT/palpitation.  \par The ILR was interrogated today and there were no arrhythmias since December 2022.  \par \par She has no chest pain, shortness of breath, dizziness, lightheadedness, syncope or presyncope.\par Previous ILR interrogation: 2021: Patient had 3 very short episodes she had to 1 episodes she will 20 around the same time of 23: 29 lasting 9 seconds and 31 seconds respectively with mean ventricular rate of 180 bpm.  She had another similar episode on April 8 at 1: 48 less than 20 seconds with a similar heart rate of 180 bpm.  These episodes appear to be in SVT/atrial tachycardia.  \par She had prior left hip replacements last year and recently had a right hip replacement.\par She has an implantable loop monitor and previous recordings had shown nonsustained SVT with rates varying between 150 to 180 bpm and prior wide-complex tachycardia 4 beats.  \par \par The patient has been on metoprolol 25 mg a day time she takes it twice daily.  She has been under recent increased stress.\par \par History of asthma - not requiring treatment\par No HTN or DM\par ? Thyroid - hypo\par No sleep issues\par She is on metoprolol 25 mg qd .\par Evaluation:\par Exercise stress test 2/6/18: 10 METS, no ST abnormalities, no arrhythmia seen. 98% MPHR\par Echo done - verbal report : nl\par \par

## 2023-03-16 NOTE — DISCUSSION/SUMMARY
[FreeTextEntry1] : I reviewed the strips from her tachycardia episode in December 9.  It showed an SVT at about 200 bpm appeared to be an atrial tachycardia she is currently on metoprolol.  The options in her she would benefit from catheter ablation.  During this tachycardia she did had some jaw pain and her stress test was normal.  If she has recurrent episodes and does not want to do the ablation I will switch her to a calcium channel blocker from metoprolol.  We will see how she does over the next few months and make a decision regarding ablation.  The patient is doing well on metoprolol therapy.  We will continue to monitor her ILR.  Her blood pressure is low normal range.  I encouraged her to increase her fluid intake.  She does not have any symptoms of dizziness or lightheadedness\par \par Recommend follow-up echocardiogram.\par \par Follow-up remote monitoring\par \par Follow-up with primary care as well as cardiology.

## 2023-03-16 NOTE — REVIEW OF SYSTEMS
[Under Stress] : under stress [Palpitations] : palpitations [Feeling Fatigued] : not feeling fatigued [Blurry Vision] : no blurred vision [Sore Throat] : no sore throat [Dyspnea on exertion] : not dyspnea during exertion [Cough] : no cough [Abdominal Pain] : no abdominal pain [Easy Bleeding] : no tendency for easy bleeding

## 2023-03-16 NOTE — PHYSICAL EXAM
[No Acute Distress] : no acute distress [Normal Venous Pressure] : normal venous pressure [Normal S1, S2] : normal S1, S2 [Non Tender] : non-tender [No Edema] : no edema [General Appearance - In No Acute Distress] : no acute distress [Normal Conjunctiva] : the conjunctiva exhibited no abnormalities [Normal Jugular Venous V Waves Present] : normal jugular venous V waves present [Respiration, Rhythm And Depth] : normal respiratory rhythm and effort [Auscultation Breath Sounds / Voice Sounds] : lungs were clear to auscultation bilaterally [Heart Rate And Rhythm] : heart rate and rhythm were normal [Heart Sounds] : normal S1 and S2 [Murmurs] : no murmurs present [Arterial Pulses Normal] : the arterial pulses were normal [Edema] : no peripheral edema present [Abdomen Tenderness] : non-tender [Abnormal Walk] : normal gait [Nail Clubbing] : no clubbing of the fingernails [Cyanosis, Localized] : no localized cyanosis [] : no rash [Impaired Insight] : insight and judgment were intact [de-identified] : Wound site ILR: Healing well

## 2023-03-16 NOTE — PHYSICAL EXAM
[No Acute Distress] : no acute distress [Normal Venous Pressure] : normal venous pressure [Normal S1, S2] : normal S1, S2 [Non Tender] : non-tender [No Edema] : no edema [General Appearance - In No Acute Distress] : no acute distress [Normal Conjunctiva] : the conjunctiva exhibited no abnormalities [Normal Jugular Venous V Waves Present] : normal jugular venous V waves present [Respiration, Rhythm And Depth] : normal respiratory rhythm and effort [Auscultation Breath Sounds / Voice Sounds] : lungs were clear to auscultation bilaterally [Heart Rate And Rhythm] : heart rate and rhythm were normal [Heart Sounds] : normal S1 and S2 [Murmurs] : no murmurs present [Arterial Pulses Normal] : the arterial pulses were normal [Edema] : no peripheral edema present [Abdomen Tenderness] : non-tender [Abnormal Walk] : normal gait [Nail Clubbing] : no clubbing of the fingernails [Cyanosis, Localized] : no localized cyanosis [] : no rash [Impaired Insight] : insight and judgment were intact [de-identified] : Wound site ILR: Healing well

## 2023-03-16 NOTE — ADDENDUM
[FreeTextEntry1] : Patient called and wants to proceed with a catheter ablation procedure.  We discussed the procedure in detail including risk benefits and alternatives.  We will schedule.  She will be instructed to discontinue beta-blocker 2 days prior to procedure.

## 2023-03-29 ENCOUNTER — NON-APPOINTMENT (OUTPATIENT)
Age: 53
End: 2023-03-29

## 2023-03-29 ENCOUNTER — APPOINTMENT (OUTPATIENT)
Dept: OBGYN | Facility: CLINIC | Age: 53
End: 2023-03-29
Payer: MEDICAID

## 2023-03-29 VITALS
BODY MASS INDEX: 32.72 KG/M2 | WEIGHT: 215.9 LBS | HEIGHT: 68 IN | DIASTOLIC BLOOD PRESSURE: 60 MMHG | SYSTOLIC BLOOD PRESSURE: 100 MMHG

## 2023-03-29 DIAGNOSIS — Z12.39 ENCOUNTER FOR OTHER SCREENING FOR MALIGNANT NEOPLASM OF BREAST: ICD-10-CM

## 2023-03-29 DIAGNOSIS — Z01.419 ENCOUNTER FOR GYNECOLOGICAL EXAMINATION (GENERAL) (ROUTINE) W/OUT ABNORMAL FINDINGS: ICD-10-CM

## 2023-03-29 PROCEDURE — 99396 PREV VISIT EST AGE 40-64: CPT

## 2023-03-29 NOTE — PHYSICAL EXAM
[Chaperone Present] : A chaperone was present in the examining room during all aspects of the physical examination [FreeTextEntry1] : [place] [Appropriately responsive] : appropriately responsive [Alert] : alert [No Acute Distress] : no acute distress [No Lymphadenopathy] : no lymphadenopathy [Regular Rate Rhythm] : regular rate rhythm [No Murmurs] : no murmurs [Clear to Auscultation B/L] : clear to auscultation bilaterally [Soft] : soft [Non-tender] : non-tender [Non-distended] : non-distended [No HSM] : No HSM [No Lesions] : no lesions [No Mass] : no mass [Oriented x3] : oriented x3 [Examination Of The Breasts] : a normal appearance [No Masses] : no breast masses were palpable [Labia Majora] : normal [Labia Minora] : normal [Normal] : normal [Absent] : absent [Uterine Adnexae] : normal

## 2023-03-29 NOTE — HISTORY OF PRESENT ILLNESS
[N] : Patient is not sexually active [Y] : Positive pregnancy history [Menarche Age: ____] : age at menarche was [unfilled] [PGxTotal] : 7 [[facility] Center[facility]] : 5 [PGHxPremature] : 0 [PGHxAbortions] : 2 [[facility] CenterxLiving] : 5 [PGHxABInduced] : 1 [PGHxABSpont] : 1 [PGHxEctopic] : 0 [PGHxMultBirths] : 0

## 2023-04-03 LAB — CYTOLOGY CVX/VAG DOC THIN PREP: NORMAL

## 2023-04-17 ENCOUNTER — NON-APPOINTMENT (OUTPATIENT)
Age: 53
End: 2023-04-17

## 2023-04-17 ENCOUNTER — APPOINTMENT (OUTPATIENT)
Dept: CARDIOLOGY | Facility: CLINIC | Age: 53
End: 2023-04-17
Payer: MEDICAID

## 2023-04-17 PROCEDURE — 93298 REM INTERROG DEV EVAL SCRMS: CPT

## 2023-04-17 PROCEDURE — G2066: CPT

## 2023-04-19 ENCOUNTER — OUTPATIENT (OUTPATIENT)
Dept: OUTPATIENT SERVICES | Facility: HOSPITAL | Age: 53
LOS: 1 days | End: 2023-04-19
Payer: MEDICAID

## 2023-04-19 VITALS
DIASTOLIC BLOOD PRESSURE: 70 MMHG | TEMPERATURE: 97 F | HEIGHT: 68 IN | RESPIRATION RATE: 16 BRPM | HEART RATE: 60 BPM | OXYGEN SATURATION: 98 % | WEIGHT: 216.05 LBS | SYSTOLIC BLOOD PRESSURE: 118 MMHG

## 2023-04-19 DIAGNOSIS — Z91.89 OTHER SPECIFIED PERSONAL RISK FACTORS, NOT ELSEWHERE CLASSIFIED: ICD-10-CM

## 2023-04-19 DIAGNOSIS — Z96.642 PRESENCE OF LEFT ARTIFICIAL HIP JOINT: Chronic | ICD-10-CM

## 2023-04-19 DIAGNOSIS — Z98.891 HISTORY OF UTERINE SCAR FROM PREVIOUS SURGERY: Chronic | ICD-10-CM

## 2023-04-19 DIAGNOSIS — Z90.710 ACQUIRED ABSENCE OF BOTH CERVIX AND UTERUS: Chronic | ICD-10-CM

## 2023-04-19 DIAGNOSIS — Z01.818 ENCOUNTER FOR OTHER PREPROCEDURAL EXAMINATION: ICD-10-CM

## 2023-04-19 DIAGNOSIS — J45.909 UNSPECIFIED ASTHMA, UNCOMPLICATED: ICD-10-CM

## 2023-04-19 DIAGNOSIS — Z98.890 OTHER SPECIFIED POSTPROCEDURAL STATES: Chronic | ICD-10-CM

## 2023-04-19 DIAGNOSIS — D32.0 BENIGN NEOPLASM OF CEREBRAL MENINGES: Chronic | ICD-10-CM

## 2023-04-19 DIAGNOSIS — R00.0 TACHYCARDIA, UNSPECIFIED: ICD-10-CM

## 2023-04-19 DIAGNOSIS — Z96.641 PRESENCE OF RIGHT ARTIFICIAL HIP JOINT: Chronic | ICD-10-CM

## 2023-04-19 LAB
ANION GAP SERPL CALC-SCNC: 13 MMOL/L — SIGNIFICANT CHANGE UP (ref 5–17)
APTT BLD: 32.5 SEC — SIGNIFICANT CHANGE UP (ref 27.5–35.5)
BASOPHILS # BLD AUTO: 0.02 K/UL — SIGNIFICANT CHANGE UP (ref 0–0.2)
BASOPHILS NFR BLD AUTO: 0.4 % — SIGNIFICANT CHANGE UP (ref 0–2)
BLD GP AB SCN SERPL QL: SIGNIFICANT CHANGE UP
BUN SERPL-MCNC: 21.6 MG/DL — HIGH (ref 8–20)
CALCIUM SERPL-MCNC: 9.3 MG/DL — SIGNIFICANT CHANGE UP (ref 8.4–10.5)
CHLORIDE SERPL-SCNC: 102 MMOL/L — SIGNIFICANT CHANGE UP (ref 96–108)
CO2 SERPL-SCNC: 25 MMOL/L — SIGNIFICANT CHANGE UP (ref 22–29)
CREAT SERPL-MCNC: 1 MG/DL — SIGNIFICANT CHANGE UP (ref 0.5–1.3)
EGFR: 68 ML/MIN/1.73M2 — SIGNIFICANT CHANGE UP
EOSINOPHIL # BLD AUTO: 0.05 K/UL — SIGNIFICANT CHANGE UP (ref 0–0.5)
EOSINOPHIL NFR BLD AUTO: 1.1 % — SIGNIFICANT CHANGE UP (ref 0–6)
GLUCOSE SERPL-MCNC: 84 MG/DL — SIGNIFICANT CHANGE UP (ref 70–99)
HCT VFR BLD CALC: 38 % — SIGNIFICANT CHANGE UP (ref 34.5–45)
HGB BLD-MCNC: 12.3 G/DL — SIGNIFICANT CHANGE UP (ref 11.5–15.5)
IMM GRANULOCYTES NFR BLD AUTO: 0.4 % — SIGNIFICANT CHANGE UP (ref 0–0.9)
INR BLD: 1.14 RATIO — SIGNIFICANT CHANGE UP (ref 0.88–1.16)
LYMPHOCYTES # BLD AUTO: 0.93 K/UL — LOW (ref 1–3.3)
LYMPHOCYTES # BLD AUTO: 20.8 % — SIGNIFICANT CHANGE UP (ref 13–44)
MAGNESIUM SERPL-MCNC: 2 MG/DL — SIGNIFICANT CHANGE UP (ref 1.8–2.6)
MCHC RBC-ENTMCNC: 26.9 PG — LOW (ref 27–34)
MCHC RBC-ENTMCNC: 32.4 GM/DL — SIGNIFICANT CHANGE UP (ref 32–36)
MCV RBC AUTO: 83 FL — SIGNIFICANT CHANGE UP (ref 80–100)
MONOCYTES # BLD AUTO: 0.46 K/UL — SIGNIFICANT CHANGE UP (ref 0–0.9)
MONOCYTES NFR BLD AUTO: 10.3 % — SIGNIFICANT CHANGE UP (ref 2–14)
NEUTROPHILS # BLD AUTO: 3 K/UL — SIGNIFICANT CHANGE UP (ref 1.8–7.4)
NEUTROPHILS NFR BLD AUTO: 67 % — SIGNIFICANT CHANGE UP (ref 43–77)
PLATELET # BLD AUTO: 320 K/UL — SIGNIFICANT CHANGE UP (ref 150–400)
POTASSIUM SERPL-MCNC: 4.6 MMOL/L — SIGNIFICANT CHANGE UP (ref 3.5–5.3)
POTASSIUM SERPL-SCNC: 4.6 MMOL/L — SIGNIFICANT CHANGE UP (ref 3.5–5.3)
PROTHROM AB SERPL-ACNC: 13.2 SEC — SIGNIFICANT CHANGE UP (ref 10.5–13.4)
RBC # BLD: 4.58 M/UL — SIGNIFICANT CHANGE UP (ref 3.8–5.2)
RBC # FLD: 13.7 % — SIGNIFICANT CHANGE UP (ref 10.3–14.5)
SODIUM SERPL-SCNC: 140 MMOL/L — SIGNIFICANT CHANGE UP (ref 135–145)
T3 SERPL-MCNC: 86 NG/DL — SIGNIFICANT CHANGE UP (ref 80–200)
T4 AB SER-ACNC: 6.3 UG/DL — SIGNIFICANT CHANGE UP (ref 4.5–12)
TSH SERPL-MCNC: 0.55 UIU/ML — SIGNIFICANT CHANGE UP (ref 0.27–4.2)
WBC # BLD: 4.48 K/UL — SIGNIFICANT CHANGE UP (ref 3.8–10.5)
WBC # FLD AUTO: 4.48 K/UL — SIGNIFICANT CHANGE UP (ref 3.8–10.5)

## 2023-04-19 PROCEDURE — 93010 ELECTROCARDIOGRAM REPORT: CPT

## 2023-04-19 PROCEDURE — 85730 THROMBOPLASTIN TIME PARTIAL: CPT

## 2023-04-19 PROCEDURE — 86850 RBC ANTIBODY SCREEN: CPT

## 2023-04-19 PROCEDURE — 93005 ELECTROCARDIOGRAM TRACING: CPT

## 2023-04-19 PROCEDURE — 83735 ASSAY OF MAGNESIUM: CPT

## 2023-04-19 PROCEDURE — 85025 COMPLETE CBC W/AUTO DIFF WBC: CPT

## 2023-04-19 PROCEDURE — 36415 COLL VENOUS BLD VENIPUNCTURE: CPT

## 2023-04-19 PROCEDURE — 86900 BLOOD TYPING SEROLOGIC ABO: CPT

## 2023-04-19 PROCEDURE — 84436 ASSAY OF TOTAL THYROXINE: CPT

## 2023-04-19 PROCEDURE — 86901 BLOOD TYPING SEROLOGIC RH(D): CPT

## 2023-04-19 PROCEDURE — 85610 PROTHROMBIN TIME: CPT

## 2023-04-19 PROCEDURE — 84443 ASSAY THYROID STIM HORMONE: CPT

## 2023-04-19 PROCEDURE — 84480 ASSAY TRIIODOTHYRONINE (T3): CPT

## 2023-04-19 PROCEDURE — G0463: CPT

## 2023-04-19 PROCEDURE — 80048 BASIC METABOLIC PNL TOTAL CA: CPT

## 2023-04-19 RX ORDER — PREGABALIN 225 MG/1
1 CAPSULE ORAL
Qty: 0 | Refills: 0 | DISCHARGE

## 2023-04-19 RX ORDER — FERROUS SULFATE 325(65) MG
65 TABLET ORAL
Qty: 0 | Refills: 0 | DISCHARGE

## 2023-04-19 RX ORDER — METOPROLOL TARTRATE 50 MG
1 TABLET ORAL
Qty: 0 | Refills: 0 | DISCHARGE

## 2023-04-19 NOTE — H&P PST ADULT - NEGATIVE CARDIOVASCULAR SYMPTOMS
no chest pain/no dyspnea on exertion/no peripheral edema no chest pain/no dyspnea on exertion normal

## 2023-04-19 NOTE — H&P PST ADULT - NSANTHBPHIGHRD_ENT_A_CORE
Impression: Hypermetropia, bilateral: H52.03. Plan: Reviewed refractive prescription in detail with patient and need for glasses to improve vision. Release spectacle prescription at this time. Yes

## 2023-04-19 NOTE — H&P PST ADULT - CARDIOVASCULAR
details… regular rate and rhythm/S1 S2 present/no murmur/no JVD regular rate and rhythm/S1 S2 present/no gallops/no rub/no murmur/no JVD/pedal edema

## 2023-04-19 NOTE — H&P PST ADULT - ASSESSMENT
Plan:   Labs pending.   Pre-procedure instructions provided (verbal & written) as follows:  Ablation 2023    - Last dose Eliquis/Pradaxa/Xarelto/Savaysa */* PM (NO a/c day of ablation).    - NPO after midnight prior except meds w/ sips of water.    - Hold the following medications the morning of the procedure: metoprolol   - As per Dr. Masterson discontinue beta-blocker 2 days prior to procedure.   - Outpatient medication record reviewed with patient.     CAPRINI SCORE    AGE RELATED RISK FACTORS                                                             [ ] Age 41-60 years                                            (1 Point)  [ ] Age: 61-74 years                                           (2 Points)                 [ ] Age= 75 years                                                (3 Points)             DISEASE RELATED RISK FACTORS                                                       [ ] Edema in the lower extremities                 (1 Point)                     [ ] Varicose veins                                               (1 Point)                                 [ ] BMI > 25 Kg/m2                                            (1 Point)                                  [ ] Serious infection (ie PNA)                            (1 Point)                     [ ] Lung disease ( COPD, Emphysema)            (1 Point)                                                                          [ ] Acute myocardial infarction                         (1 Point)                  [ ] Congestive heart failure (in the previous month)  (1 Point)         [ ] Inflammatory bowel disease                            (1 Point)                  [ ] Central venous access, PICC or Port               (2 points)       (within the last month)                                                                [ ] Stroke (in the previous month)                        (5 Points)    [ ] Previous or present malignancy                       (2 points)                                                                                                                                                         HEMATOLOGY RELATED FACTORS                                                         [ ] Prior episodes of VTE                                     (3 Points)                     [ ] Positive family history for VTE                      (3 Points)                  [ ] Prothrombin 37454 A                                     (3 Points)                     [ ] Factor V Leiden                                                (3 Points)                        [ ] Lupus anticoagulants                                      (3 Points)                                                           [ ] Anticardiolipin antibodies                              (3 Points)                                                       [ ] High homocysteine in the blood                   (3 Points)                                             [ ] Other congenital or acquired thrombophilia      (3 Points)                                                [ ] Heparin induced thrombocytopenia                  (3 Points)                                        MOBILITY RELATED FACTORS  [ ] Bed rest                                                         (1 Point)  [ ] Plaster cast                                                    (2 points)  [ ] Bed bound for more than 72 hours           (2 Points)    GENDER SPECIFIC FACTORS  [ ] Pregnancy or had a baby within the last month   (1 Point)  [ ] Post-partum < 6 weeks                                   (1 Point)  [ ] Hormonal therapy  or oral contraception   (1 Point)  [ ] History of pregnancy complications              (1 point)  [ ] Unexplained or recurrent              (1 Point)    OTHER RISK FACTORS                                           (1 Point)  [ ] BMI >40, smoking, diabetes requiring insulin, chemotherapy  blood transfusions and length of surgery over 2 hours    SURGERY RELATED RISK FACTORS  [ ]  Section within the last month     (1 Point)  [ ] Minor surgery                                                  (1 Point)  [ ] Arthroscopic surgery                                       (2 Points)  [ ] Planned major surgery lasting more            (2 Points)      than 45 minutes     [ ] Elective hip or knee joint replacement       (5 points)       surgery                                                TRAUMA RELATED RISK FACTORS  [ ] Fracture of the hip, pelvis, or leg                       (5 Points)  [ ] Spinal cord injury resulting in paralysis             (5 points)       (in the previous month)    [ ] Paralysis  (less than 1 month)                             (5 Points)  [ ] Multiple Trauma within 1 month                        (5 Points)    Total Score [        ]    Caprini Score 0-2: Low Risk, NO VTE prophylaxis required for most patients, encourage ambulation  Caprini Score 3-6: Moderate Risk , pharmacologic VTE prophylaxis is indicated for most patients (in the absence of contraindications)  Caprini Score Greater than or =7: High risk, pharmocologic VTE prophylaxis indicated for most patients (in the absence of contraindications)    OPIOID RISK TOOL    RAMU EACH BOX THAT APPLIES AND ADD TOTALS AT THE END    FAMILY HISTORY OF SUBSTANCE ABUSE                 FEMALE         MALE                                                Alcohol                             [  ]1 pt          [  ]3pts                                               Illegal Durgs                     [  ]2 pts        [  ]3pts                                               Rx Drugs                           [  ]4 pts        [  ]4 pts    PERSONAL HISTORY OF SUBSTANCE ABUSE                                                                                          Alcohol                             [  ]3 pts       [  ]3 pts                                               Illegal Drugs                     [  ]4 pts        [  ]4 pts                                               Rx Drugs                           [  ]5 pts        [  ]5 pts    AGE BETWEEN 16-45 YEARS                                      [  ]1 pt         [  ]1 pt    HISTORY OF PREADOLESCENT   SEXUAL ABUSE                                                             [  ]3 pts        [  ]0pts    PSYCHOLOGICAL DISEASE                     ADD, OCD, Bipolar, Schizophrenia        [  ]2 pts         [  ]2 pts                      Depression                                               [  ]1 pt           [  ]1 pt           SCORING TOTAL   (add numbers and type here)              (***)                                     A score of 3 or lower indicated LOW risk for future opioid abuse  A score of 4 to 7 indicated moderate risk for future opioid abuse  A score of 8 or higher indicates a high risk for opioid abuse     Plan:   Labs pending.   Pre-procedure instructions provided (verbal & written) as follows:  Ablation 2023    - NPO after midnight prior except meds w/ sips of water.    - Hold the following medications the morning of the procedure: metoprolol   - As per Dr. Masterson discontinue beta-blocker 2 days prior to procedure.   - Outpatient medication record reviewed with patient.     CAPRINI SCORE    AGE RELATED RISK FACTORS                                                             [ ] Age 41-60 years                                            (1 Point)  [ ] Age: 61-74 years                                           (2 Points)                 [ ] Age= 75 years                                                (3 Points)             DISEASE RELATED RISK FACTORS                                                       [ ] Edema in the lower extremities                 (1 Point)                     [ ] Varicose veins                                               (1 Point)                                 [ ] BMI > 25 Kg/m2                                            (1 Point)                                  [ ] Serious infection (ie PNA)                            (1 Point)                     [ ] Lung disease ( COPD, Emphysema)            (1 Point)                                                                          [ ] Acute myocardial infarction                         (1 Point)                  [ ] Congestive heart failure (in the previous month)  (1 Point)         [ ] Inflammatory bowel disease                            (1 Point)                  [ ] Central venous access, PICC or Port               (2 points)       (within the last month)                                                                [ ] Stroke (in the previous month)                        (5 Points)    [ ] Previous or present malignancy                       (2 points)                                                                                                                                                         HEMATOLOGY RELATED FACTORS                                                         [ ] Prior episodes of VTE                                     (3 Points)                     [ ] Positive family history for VTE                      (3 Points)                  [ ] Prothrombin 19786 A                                     (3 Points)                     [ ] Factor V Leiden                                                (3 Points)                        [ ] Lupus anticoagulants                                      (3 Points)                                                           [ ] Anticardiolipin antibodies                              (3 Points)                                                       [ ] High homocysteine in the blood                   (3 Points)                                             [ ] Other congenital or acquired thrombophilia      (3 Points)                                                [ ] Heparin induced thrombocytopenia                  (3 Points)                                        MOBILITY RELATED FACTORS  [ ] Bed rest                                                         (1 Point)  [ ] Plaster cast                                                    (2 points)  [ ] Bed bound for more than 72 hours           (2 Points)    GENDER SPECIFIC FACTORS  [ ] Pregnancy or had a baby within the last month   (1 Point)  [ ] Post-partum < 6 weeks                                   (1 Point)  [ ] Hormonal therapy  or oral contraception   (1 Point)  [ ] History of pregnancy complications              (1 point)  [ ] Unexplained or recurrent              (1 Point)    OTHER RISK FACTORS                                           (1 Point)  [ ] BMI >40, smoking, diabetes requiring insulin, chemotherapy  blood transfusions and length of surgery over 2 hours    SURGERY RELATED RISK FACTORS  [ ]  Section within the last month     (1 Point)  [ ] Minor surgery                                                  (1 Point)  [ ] Arthroscopic surgery                                       (2 Points)  [ ] Planned major surgery lasting more            (2 Points)      than 45 minutes     [ ] Elective hip or knee joint replacement       (5 points)       surgery                                                TRAUMA RELATED RISK FACTORS  [ ] Fracture of the hip, pelvis, or leg                       (5 Points)  [ ] Spinal cord injury resulting in paralysis             (5 points)       (in the previous month)    [ ] Paralysis  (less than 1 month)                             (5 Points)  [ ] Multiple Trauma within 1 month                        (5 Points)    Total Score [        ]    Caprini Score 0-2: Low Risk, NO VTE prophylaxis required for most patients, encourage ambulation  Caprini Score 3-6: Moderate Risk , pharmacologic VTE prophylaxis is indicated for most patients (in the absence of contraindications)  Caprini Score Greater than or =7: High risk, pharmocologic VTE prophylaxis indicated for most patients (in the absence of contraindications)    OPIOID RISK TOOL    RAMU EACH BOX THAT APPLIES AND ADD TOTALS AT THE END    FAMILY HISTORY OF SUBSTANCE ABUSE                 FEMALE         MALE                                                Alcohol                             [  ]1 pt          [  ]3pts                                               Illegal Durgs                     [  ]2 pts        [  ]3pts                                               Rx Drugs                           [  ]4 pts        [  ]4 pts    PERSONAL HISTORY OF SUBSTANCE ABUSE                                                                                          Alcohol                             [  ]3 pts       [  ]3 pts                                               Illegal Drugs                     [  ]4 pts        [  ]4 pts                                               Rx Drugs                           [  ]5 pts        [  ]5 pts    AGE BETWEEN 16-45 YEARS                                      [  ]1 pt         [  ]1 pt    HISTORY OF PREADOLESCENT   SEXUAL ABUSE                                                             [  ]3 pts        [  ]0pts    PSYCHOLOGICAL DISEASE                     ADD, OCD, Bipolar, Schizophrenia        [  ]2 pts         [  ]2 pts                      Depression                                               [  ]1 pt           [  ]1 pt           SCORING TOTAL   (add numbers and type here)              (***)                                     A score of 3 or lower indicated LOW risk for future opioid abuse  A score of 4 to 7 indicated moderate risk for future opioid abuse  A score of 8 or higher indicates a high risk for opioid abuse   53y Female with history of asthma, HTN, cerebral meningeoma (s/p removal ) and symptomatic SVT. First episode of tachycardia was approximately 5 years ago. Previous ILR interrogation in  showed 3 very short episodes she had to 1 episode she will 20 around the same time of 23: 29 lasting 9 seconds and 31 seconds respectively with mean ventricular rate of 180 bpm. After the initial ILR battery , another ILR was placed. She has had recordings that have shown nonsustained SVT with rates varying between 150 to 180 bpm and prior wide-complex tachycardia 4 beats. Her most recent episode was in 2022, she was seen at [facility] due to episode of tachycardia. States when she experiences the tachycardia she has chest tightness, palpitations, lightheadedness and near syncope. She is currently on aspirin and metoprolol 50 mg twice daily. Denies current chest pain, palpitations, shortness of breath, dizziness or near syncope. Patient is scheduled for SVT ablation on 23 with Dr. Masterson.     Plan:   Labs pending.   Pre-procedure instructions provided (verbal & written) as follows:  Ablation 2023    - Continue aspirin   - NPO after midnight prior except meds w/ sips of water.    - Hold the following medications the morning of the procedure: metoprolol   - As per Dr. Masterson discontinue beta-blocker 2 days prior to procedure.   - Outpatient medication record reviewed with patient.     CAPRINI SCORE    AGE RELATED RISK FACTORS                                                             [x ] Age 41-60 years                                            (1 Point)  [ ] Age: 61-74 years                                           (2 Points)                 [ ] Age= 75 years                                                (3 Points)             DISEASE RELATED RISK FACTORS                                                       [x ] Edema in the lower extremities                 (1 Point)                     [ ] Varicose veins                                               (1 Point)                                 [x ] BMI > 25 Kg/m2                                            (1 Point)                                  [ ] Serious infection (ie PNA)                            (1 Point)                     [ ] Lung disease ( COPD, Emphysema)            (1 Point)                                                                          [ ] Acute myocardial infarction                         (1 Point)                  [ ] Congestive heart failure (in the previous month)  (1 Point)         [ ] Inflammatory bowel disease                            (1 Point)                  [ ] Central venous access, PICC or Port               (2 points)       (within the last month)                                                                [ ] Stroke (in the previous month)                        (5 Points)    [ ] Previous or present malignancy                       (2 points)                                                                                                                                                         HEMATOLOGY RELATED FACTORS                                                         [ ] Prior episodes of VTE                                     (3 Points)                     [ ] Positive family history for VTE                      (3 Points)                  [ ] Prothrombin 03183 A                                     (3 Points)                     [ ] Factor V Leiden                                                (3 Points)                        [ ] Lupus anticoagulants                                      (3 Points)                                                           [ ] Anticardiolipin antibodies                              (3 Points)                                                       [ ] High homocysteine in the blood                   (3 Points)                                             [ ] Other congenital or acquired thrombophilia      (3 Points)                                                [ ] Heparin induced thrombocytopenia                  (3 Points)                                        MOBILITY RELATED FACTORS  [ ] Bed rest                                                         (1 Point)  [ ] Plaster cast                                                    (2 points)  [ ] Bed bound for more than 72 hours           (2 Points)    GENDER SPECIFIC FACTORS  [ ] Pregnancy or had a baby within the last month   (1 Point)  [ ] Post-partum < 6 weeks                                   (1 Point)  [ ] Hormonal therapy  or oral contraception   (1 Point)  [ ] History of pregnancy complications              (1 point)  [ ] Unexplained or recurrent              (1 Point)    OTHER RISK FACTORS                                           (1 Point)  [x ] BMI >40, smoking, diabetes requiring insulin, chemotherapy  blood transfusions and length of surgery over 2 hours    SURGERY RELATED RISK FACTORS  [ ]  Section within the last month     (1 Point)  [ ] Minor surgery                                                  (1 Point)  [ ] Arthroscopic surgery                                       (2 Points)  [x ] Planned major surgery lasting more            (2 Points)      than 45 minutes     [ ] Elective hip or knee joint replacement       (5 points)       surgery                                                TRAUMA RELATED RISK FACTORS  [ ] Fracture of the hip, pelvis, or leg                       (5 Points)  [ ] Spinal cord injury resulting in paralysis             (5 points)       (in the previous month)    [ ] Paralysis  (less than 1 month)                             (5 Points)  [ ] Multiple Trauma within 1 month                        (5 Points)    Total Score [    6    ]    Caprini Score 0-2: Low Risk, NO VTE prophylaxis required for most patients, encourage ambulation  Caprini Score 3-6: Moderate Risk , pharmacologic VTE prophylaxis is indicated for most patients (in the absence of contraindications)  Caprini Score Greater than or =7: High risk, pharmocologic VTE prophylaxis indicated for most patients (in the absence of contraindications)    OPIOID RISK TOOL    RAMU EACH BOX THAT APPLIES AND ADD TOTALS AT THE END    FAMILY HISTORY OF SUBSTANCE ABUSE                 FEMALE         MALE                                                Alcohol                             [  ]1 pt          [  ]3pts                                               Illegal Durgs                     [  ]2 pts        [  ]3pts                                               Rx Drugs                           [  ]4 pts        [  ]4 pts    PERSONAL HISTORY OF SUBSTANCE ABUSE                                                                                          Alcohol                             [  ]3 pts       [  ]3 pts                                               Illegal Drugs                     [  ]4 pts        [  ]4 pts                                               Rx Drugs                           [  ]5 pts        [  ]5 pts    AGE BETWEEN 16-45 YEARS                                      [  ]1 pt         [  ]1 pt    HISTORY OF PREADOLESCENT   SEXUAL ABUSE                                                             [  ]3 pts        [  ]0pts    PSYCHOLOGICAL DISEASE                     ADD, OCD, Bipolar, Schizophrenia        [  ]2 pts         [  ]2 pts                      Depression                                               [  ]1 pt           [  ]1 pt           SCORING TOTAL   (add numbers and type here)              (*0**)                                     A score of 3 or lower indicated LOW risk for future opioid abuse  A score of 4 to 7 indicated moderate risk for future opioid abuse  A score of 8 or higher indicates a high risk for opioid abuse
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
## 2023-04-19 NOTE — H&P PST ADULT - GASTROINTESTINAL
negative soft/nontender/nondistended normal/soft/nontender/nondistended/normal active bowel sounds/no guarding/no rigidity/no organomegaly

## 2023-04-19 NOTE — H&P PST ADULT - NSICDXPASTSURGICALHX_GEN_ALL_CORE_FT
PAST SURGICAL HISTORY:  Benign meningioma excision     H/O  section X1    H/O lumpectomy left   benign tumor    H/O: hysterectomy     History of total hip replacement, left 2020     PAST SURGICAL HISTORY:  Benign meningioma excision     H/O  section X1    H/O lumpectomy left   benign tumor    H/O: hysterectomy     History of right hip replacement     History of total hip replacement, left 2020

## 2023-04-19 NOTE — H&P PST ADULT - HISTORY OF PRESENT ILLNESS
53y Female with history of _____________, and symptomatic **paroxysmal or persistent** atrial fibrillation. **add additional description as applicable**      Patient was hospitalized at [facility] December 2022 with episode of tachycardia.    She has been doing well recently but has short episodes of fluttering.    She is currently on aspirin and metoprolol 50 mg twice daily.    Her implantable loop monitor was interrogated and she has not had any episodes since hospital discharge. She does have short fluttering episodes which is not recorded on the monitor.  The patient had a nuclear stress test performed at [facility] on March 7/2023. She exercised to 95% of her MPHR with 9 minutes had no symptoms and nondiagnostic EKG. She had a normal stress test.    status post recent ILR replacement. She has a prior history of SVT/palpitation.   The ILR was interrogated today and there were no arrhythmias since December 2022.     She has no chest pain, shortness of breath, dizziness, lightheadedness, syncope or presyncope.  Previous ILR interrogation: 2021: Patient had 3 very short episodes she had to 1 episodes she will 19 around the same time of 23: 29 lasting 9 seconds and 31 seconds respectively with mean ventricular rate of 180 bpm. She had another similar episode on April 8 at 1: 48 less than 20 seconds with a similar heart rate of 180 bpm. These episodes appear to be in SVT/atrial tachycardia.   She had prior left hip replacements last year and recently had a right hip replacement.  She has an implantable loop monitor and previous recordings had shown nonsustained SVT with rates varying between 150 to 180 bpm and prior wide-complex tachycardia 4 beats.     The patient has been on metoprolol 25 mg a day time she takes it twice daily. She has been under recent increased stress.    History of asthma - not requiring treatment  No HTN or DM  ? Thyroid - hypo  No sleep issues  She is on metoprolol 25 mg qd .  Evaluation:  Exercise stress test 2/6/18: 10 METS, no ST abnormalities, no arrhythmia seen. 98% MPHR  Echo done - verbal report : nl        Echocardiogram (date):   Stress Test (date): Exercise stress test 3/7/23 left ventricle normal in size, normal myocardial perfusion scan, no evidence of infarction or inducible ischemia   Cardiac CT or MRI (date):   Cardiac Cath (date):   Cardiac surgery (date):       52 year old female patient with a longstanding history of recurrent palpitations x 3-4 years, ILR implanted in 2018, who presents with ILR at [facility]. She was in the past reportedly found to have AF at [facility] (no strips available for review). The patient's ILR had 3 very short episodes of rapid tachycardia with rates in the 180s which may represent SVT/atrial tachycardia. In the past she was on beta blockers which she did not particulary tolerate. Today she feels well and offers no complaints.     Cardiac Summary:   Exercise stress test 2/6/18: 10 METS, no ST abnormalities, no arrhythmia seen. 98% MPHR  Echo - normal heart w/ normal EF.  53y Female with history of asthma, HTN, cerebral meningeoma (s/p removal 2011) and symptomatic SVT.     First episode of tachycardia was approximately 5 years ago.      Patient was hospitalized at [facility] December 2022 with episode of tachycardia.    She has been doing well recently but has short episodes of fluttering.    She is currently on aspirin and metoprolol 50 mg twice daily.    Her implantable loop monitor was interrogated and she has not had any episodes since hospital discharge. She does have short fluttering episodes which is not recorded on the monitor.  The patient had a nuclear stress test performed at [facility] on March 7/2023. She exercised to 95% of her MPHR with 9 minutes had no symptoms and nondiagnostic EKG. She had a normal stress test.    status post recent ILR replacement. She has a prior history of SVT/palpitation.   The ILR was interrogated today and there were no arrhythmias since December 2022.     She has no chest pain, shortness of breath, dizziness, lightheadedness, syncope or presyncope.  Previous ILR interrogation: 2021: Patient had 3 very short episodes she had to 1 episodes she will 19 around the same time of 23: 29 lasting 9 seconds and 31 seconds respectively with mean ventricular rate of 180 bpm. She had another similar episode on April 8 at 1: 48 less than 20 seconds with a similar heart rate of 180 bpm. These episodes appear to be in SVT/atrial tachycardia.   She had prior left hip replacements last year and recently had a right hip replacement.  She has an implantable loop monitor and previous recordings had shown nonsustained SVT with rates varying between 150 to 180 bpm and prior wide-complex tachycardia 4 beats.     The patient has been on metoprolol 25 mg a day time she takes it twice daily. She has been under recent increased stress.    Denies current chest pain, palpitations, shortness of breath, dizziness or near syncope. Patient is scheduled for SVT ablation on 4/27/23 with Dr. Masterson.     Echocardiogram (date): Echo done - verbal report : nl  Stress Test (date): Exercise stress test 3/7/23 left ventricle normal in size, normal myocardial perfusion scan, no evidence of infarction or inducible ischemia   Cardiac CT or MRI (date):   Cardiac Cath (date):   Cardiac surgery (date):       52 year old female patient with a longstanding history of recurrent palpitations x 3-4 years, ILR implanted in 2018, who presents with ILR at [facility]. She was in the past reportedly found to have AF at [facility] (no strips available for review). The patient's ILR had 3 very short episodes of rapid tachycardia with rates in the 180s which may represent SVT/atrial tachycardia. In the past she was on beta blockers which she did not particulary tolerate. Today she feels well and offers no complaints.     Cardiac Summary:   Exercise stress test 2/6/18: 10 METS, no ST abnormalities, no arrhythmia seen. 98% MPHR  Echo - normal heart w/ normal EF.  53y Female with history of asthma, HTN, cerebral meningeoma (s/p removal ) and symptomatic SVT. First episode of tachycardia was approximately 5 years ago. Previous ILR interrogation in  showed 3 very short episodes she had to 1 episodes she will 19 around the same time of 23: 29 lasting 9 seconds and 31 seconds respectively with mean ventricular rate of 180 bpm. After the initial ILR battery , another ILR was placed. She has had recordings that have shown nonsustained SVT with rates varying between 150 to 180 bpm and prior wide-complex tachycardia 4 beats. Her most recent episode was in 2022, she was seen at [facility] due to episode of tachycardia. States when she experiences the tachycardia she has chest tightness, palpitations, lightheadedness and near syncope. She is currently on aspirin and metoprolol 50 mg twice daily. Denies current chest pain, palpitations, shortness of breath, dizziness or near syncope. Patient is scheduled for SVT ablation on 23 with Dr. Masterson.     Echocardiogram (date): Echo done - verbal report : nl  Stress Test (date): Exercise stress test 3/7/23 left ventricle normal in size, normal myocardial perfusion scan, no evidence of infarction or inducible ischemia   Cardiac CT or MRI (date):   Cardiac Cath (date):   Cardiac surgery (date): ILR placed

## 2023-04-19 NOTE — H&P PST ADULT - MUSCULOSKELETAL
ROM intact/normal gait/strength 5/5 bilateral upper extremities negative normal/ROM intact/no calf tenderness/normal gait/strength 5/5 bilateral upper extremities

## 2023-04-19 NOTE — H&P PST ADULT - RESPIRATORY
clear to auscultation bilaterally/no wheezes/no rales/no rhonchi/no respiratory distress clear to auscultation bilaterally/no wheezes/no rales/no rhonchi/no respiratory distress/breath sounds equal/good air movement

## 2023-04-27 ENCOUNTER — TRANSCRIPTION ENCOUNTER (OUTPATIENT)
Age: 53
End: 2023-04-27

## 2023-04-27 ENCOUNTER — INPATIENT (INPATIENT)
Facility: HOSPITAL | Age: 53
LOS: 0 days | Discharge: HOME | DRG: 274 | End: 2023-04-28
Attending: INTERNAL MEDICINE | Admitting: INTERNAL MEDICINE
Payer: MEDICAID

## 2023-04-27 VITALS
HEART RATE: 89 BPM | WEIGHT: 216.05 LBS | TEMPERATURE: 98 F | SYSTOLIC BLOOD PRESSURE: 130 MMHG | DIASTOLIC BLOOD PRESSURE: 60 MMHG | OXYGEN SATURATION: 100 % | HEIGHT: 68 IN | RESPIRATION RATE: 16 BRPM

## 2023-04-27 DIAGNOSIS — Z96.641 PRESENCE OF RIGHT ARTIFICIAL HIP JOINT: Chronic | ICD-10-CM

## 2023-04-27 DIAGNOSIS — Z96.642 PRESENCE OF LEFT ARTIFICIAL HIP JOINT: Chronic | ICD-10-CM

## 2023-04-27 DIAGNOSIS — Z98.891 HISTORY OF UTERINE SCAR FROM PREVIOUS SURGERY: Chronic | ICD-10-CM

## 2023-04-27 DIAGNOSIS — D32.0 BENIGN NEOPLASM OF CEREBRAL MENINGES: Chronic | ICD-10-CM

## 2023-04-27 DIAGNOSIS — Z90.710 ACQUIRED ABSENCE OF BOTH CERVIX AND UTERUS: Chronic | ICD-10-CM

## 2023-04-27 DIAGNOSIS — R00.2 PALPITATIONS: ICD-10-CM

## 2023-04-27 DIAGNOSIS — Z98.890 OTHER SPECIFIED POSTPROCEDURAL STATES: Chronic | ICD-10-CM

## 2023-04-27 PROCEDURE — 93655 ICAR CATH ABLTJ DSCRT ARRHYT: CPT

## 2023-04-27 PROCEDURE — 93656 COMPRE EP EVAL ABLTJ ATR FIB: CPT

## 2023-04-27 PROCEDURE — 93010 ELECTROCARDIOGRAM REPORT: CPT

## 2023-04-27 PROCEDURE — 92960 CARDIOVERSION ELECTRIC EXT: CPT | Mod: 59

## 2023-04-27 RX ORDER — FUROSEMIDE 40 MG
20 TABLET ORAL DAILY
Refills: 0 | Status: DISCONTINUED | OUTPATIENT
Start: 2023-04-28 | End: 2023-04-28

## 2023-04-27 RX ORDER — LISINOPRIL 2.5 MG/1
10 TABLET ORAL DAILY
Refills: 0 | Status: DISCONTINUED | OUTPATIENT
Start: 2023-04-27 | End: 2023-04-28

## 2023-04-27 RX ORDER — METOPROLOL TARTRATE 50 MG
50 TABLET ORAL
Refills: 0 | Status: DISCONTINUED | OUTPATIENT
Start: 2023-04-27 | End: 2023-04-28

## 2023-04-27 RX ORDER — ALBUTEROL 90 UG/1
0 AEROSOL, METERED ORAL
Qty: 0 | Refills: 0 | DISCHARGE

## 2023-04-27 RX ORDER — ONDANSETRON 8 MG/1
4 TABLET, FILM COATED ORAL EVERY 8 HOURS
Refills: 0 | Status: DISCONTINUED | OUTPATIENT
Start: 2023-04-27 | End: 2023-04-27

## 2023-04-27 RX ORDER — ALBUTEROL 90 UG/1
2 AEROSOL, METERED ORAL EVERY 6 HOURS
Refills: 0 | Status: DISCONTINUED | OUTPATIENT
Start: 2023-04-27 | End: 2023-04-28

## 2023-04-27 RX ORDER — BENZOCAINE AND MENTHOL 5; 1 G/100ML; G/100ML
1 LIQUID ORAL
Refills: 0 | Status: DISCONTINUED | OUTPATIENT
Start: 2023-04-27 | End: 2023-04-28

## 2023-04-27 RX ORDER — ONDANSETRON 8 MG/1
4 TABLET, FILM COATED ORAL EVERY 8 HOURS
Refills: 0 | Status: DISCONTINUED | OUTPATIENT
Start: 2023-04-27 | End: 2023-04-28

## 2023-04-27 RX ORDER — SUCRALFATE 1 G
1 TABLET ORAL
Refills: 0 | Status: DISCONTINUED | OUTPATIENT
Start: 2023-04-27 | End: 2023-04-28

## 2023-04-27 RX ORDER — METOPROLOL TARTRATE 50 MG
1 TABLET ORAL
Refills: 0 | DISCHARGE

## 2023-04-27 RX ORDER — LISINOPRIL/HYDROCHLOROTHIAZIDE 10-12.5 MG
1 TABLET ORAL
Qty: 0 | Refills: 0 | DISCHARGE

## 2023-04-27 RX ORDER — ALPRAZOLAM 0.25 MG
0.25 TABLET ORAL EVERY 6 HOURS
Refills: 0 | Status: DISCONTINUED | OUTPATIENT
Start: 2023-04-27 | End: 2023-04-28

## 2023-04-27 RX ORDER — OXYCODONE AND ACETAMINOPHEN 5; 325 MG/1; MG/1
1 TABLET ORAL EVERY 4 HOURS
Refills: 0 | Status: DISCONTINUED | OUTPATIENT
Start: 2023-04-27 | End: 2023-04-28

## 2023-04-27 RX ORDER — RIVAROXABAN 15 MG-20MG
20 KIT ORAL
Refills: 0 | Status: DISCONTINUED | OUTPATIENT
Start: 2023-04-27 | End: 2023-04-28

## 2023-04-27 RX ORDER — ACETAMINOPHEN 500 MG
650 TABLET ORAL EVERY 6 HOURS
Refills: 0 | Status: DISCONTINUED | OUTPATIENT
Start: 2023-04-27 | End: 2023-04-28

## 2023-04-27 RX ORDER — FUROSEMIDE 40 MG
10 TABLET ORAL ONCE
Refills: 0 | Status: COMPLETED | OUTPATIENT
Start: 2023-04-27 | End: 2023-04-27

## 2023-04-27 RX ORDER — PANTOPRAZOLE SODIUM 20 MG/1
40 TABLET, DELAYED RELEASE ORAL
Refills: 0 | Status: DISCONTINUED | OUTPATIENT
Start: 2023-04-27 | End: 2023-04-28

## 2023-04-27 RX ADMIN — RIVAROXABAN 20 MILLIGRAM(S): KIT at 18:18

## 2023-04-27 RX ADMIN — Medication 650 MILLIGRAM(S): at 18:18

## 2023-04-27 RX ADMIN — Medication 50 MILLIGRAM(S): at 18:18

## 2023-04-27 RX ADMIN — PANTOPRAZOLE SODIUM 40 MILLIGRAM(S): 20 TABLET, DELAYED RELEASE ORAL at 18:18

## 2023-04-27 RX ADMIN — Medication 650 MILLIGRAM(S): at 19:00

## 2023-04-27 RX ADMIN — Medication 10 MILLIGRAM(S): at 18:18

## 2023-04-27 RX ADMIN — BENZOCAINE AND MENTHOL 1 LOZENGE: 5; 1 LIQUID ORAL at 20:42

## 2023-04-27 RX ADMIN — Medication 1 GRAM(S): at 18:18

## 2023-04-27 NOTE — DISCHARGE NOTE PROVIDER - NSDCCPTREATMENT_GEN_ALL_CORE_FT
PRINCIPAL PROCEDURE  Procedure: Pulmonary vein isolation for atrial fibrillation  Findings and Treatment: - Bruising at the groin, sometimes extending down the leg, and/or a small lump under the skin at the groin access site is normal and will resolve within 2 – 3 weeks.   - Occasional skipped beats or palpitations that last for a few beats are common and generally resolve within 1-2 months.   - You may walk and take stairs at a regular pace.   - Do not perform any exercise more strenuous than walking for 1 week.   - Do not strain or lift heavy objects for 1 week.  - You may shower the day after the procedure.  - Do not soak in water (such as tub baths, hot tubs, swimming, etc.) for 1 week.   - You may resume all other activities the day after the procedure.  Call your doctor if:   - you notice bleeding, redness, drainage, swelling, increased tenderness or a hot sensation around the catheter insertion site.   - your temperature is greater than 100 degrees F for more than 24 hours.  - your rapid heart rhythm returns.  - you have any questions or concerns regarding the procedure.  If significant bleeding and/or a large lump (the size of a golf ball or bigger) occurs:  - Lie flat and apply continuous direct pressure just above the puncture site for at least 10 minutes  - If the issue resolves, notify your physician immediately.    - If the bleeding cannot be controlled, please seek immediate medical attention.  If you experience increased difficulty breathing or chest pain, or if you faint or have dizzy spells, please seek immediate medical attention.        SECONDARY PROCEDURE  Procedure: Electrophysiology study with ablation of arrhythmogenic focus for atrial tachycardia  Findings and Treatment:

## 2023-04-27 NOTE — DISCHARGE NOTE PROVIDER - NSDCMRMEDTOKEN_GEN_ALL_CORE_FT
albuterol 0.63 mg/3 mL (0.021%) inhalation solution: inhaled 2 times a day as needed  lisinopril-hydrochlorothiazide 10 mg-12.5 mg oral tablet: 1 tab(s) orally once a day (at bedtime)  metoprolol tartrate 50 mg oral tablet: 1 tab(s) orally 2 times a day   albuterol 0.63 mg/3 mL (0.021%) inhalation solution: inhaled 2 times a day as needed  lisinopril-hydrochlorothiazide 10 mg-12.5 mg oral tablet: 1 tab(s) orally once a day (at bedtime)  metoprolol tartrate 50 mg oral tablet: 1 tab(s) orally 2 times a day  pantoprazole 40 mg oral delayed release tablet: 1 tab(s) orally 2 times a day  rivaroxaban 20 mg oral tablet: 1 tab(s) orally once a day (before a meal)  sucralfate 1 g/10 mL oral suspension: 10 milliliter(s) orally 2 times a day

## 2023-04-27 NOTE — DISCHARGE NOTE PROVIDER - NSDCFUADDAPPT_GEN_ALL_CORE_FT
Stop aspirin.  Continue other home medications.  Xarelto 20mg daily, without any interruptions.  Protonix 40mg twice daily x 2 weeks, then decrease to once daily x 6 weeks.   Carafate twice daily x 2 weeks.

## 2023-04-27 NOTE — PROGRESS NOTE ADULT - SUBJECTIVE AND OBJECTIVE BOX
PROCEDURE(S): Radiofrequency Ablation of Atrial Fibrillation and Supraventricular Tachycardia     ELECTROPHYSIOLOGIST(S): Julito Masterson MD         COMPLICATIONS:  none        DISPOSITION:  observation     CONDITION: stable  EBL: <15mL    Pt doing well s/p atrial fibrillation ablation (WACA/PVI) and a right sided atrial tachycardia ablation via RFV access. Denies complaint.       MEDICATIONS  (STANDING):  furosemide   Injectable 10 milliGRAM(s) IV Push once  hydrochlorothiazide 12.5 milliGRAM(s) Oral daily  lisinopril 10 milliGRAM(s) Oral daily  metoprolol tartrate 50 milliGRAM(s) Oral two times a day  pantoprazole    Tablet 40 milliGRAM(s) Oral two times a day  rivaroxaban 20 milliGRAM(s) Oral with dinner  sucralfate suspension 1 Gram(s) Oral two times a day    MEDICATIONS  (PRN):  acetaminophen     Tablet .. 650 milliGRAM(s) Oral every 6 hours PRN Mild Pain (1 - 3)  albuterol    90 MICROgram(s) HFA Inhaler 2 Puff(s) Inhalation every 6 hours PRN Bronchospasm  ALPRAZolam 0.25 milliGRAM(s) Oral every 6 hours PRN anxiety/ insomnia  aluminum hydroxide/magnesium hydroxide/simethicone Suspension 30 milliLiter(s) Oral every 4 hours PRN Dyspepsia  benzocaine/menthol Lozenge 1 Lozenge Oral every 2 hours PRN Sore Throat  ondansetron  IVPB 4 milliGRAM(s) IV Intermittent every 8 hours PRN Nausea and/or Vomiting  oxycodone    5 mG/acetaminophen 325 mG 1 Tablet(s) Oral every 4 hours PRN Moderate Pain (4 - 6)      Allergies  Beef (Unknown)  Poultry (Unknown)  pork (Unknown)  No Known Drug Allergies      Exam:   HR:  BP:   RR:   SpO2:     Gen: VSS, NAD, A&O x 3  Card: S1/S2, RRR, no m/g/r  Resp: lungs CTA b/l  Abd: S/NT/ND  Groins: hemostatic sutures in place; sites C/D/I b/l; no bleeding, hematoma, erythema, exudate or edema  Ext: no edema; distal pulses intact  Neuro: CN II-XII grossly intact, SEGOVIA x4 with strength and sensation intact and equal bilaterally    I/Os: net +     ECG:    Assessment:   53y Female with PMH of asthma, HTN, cerebral meningeoma (s/p removal 2011), palpitations s/p ILR implant and symptomatic SVT/AF. She reports chest tightness, palpitations, lightheadedness and near syncope during episodes. Loop recorder interrogation in the past have demonstrated runs of SVT with concern for AT/AF. She now presents electively for SVT/AF ablation. Preopertive DEANN deferred as pt with low CHADSVASC (2) and pt currently in sinus rhythm. DEYANIRA cleared intraoperatively with ICE catheter. Now status post uncomplicated radiofrequency ablation of atrial fibrillation (WACA/PVI) and a right sided atrial tachycardia.      Plan:   Bedrest x 4 hours, then OOB with assistance and progress as tolerated.   Groin suture to be removed by EP service in AM.   Pending groin status: START 20mg PO Xarelto at 16:30 tonight.   DO NOT HOLD, INTERRUPT OR REVERSE ANTICOAGULATION WITHOUT EXPLICIT APPROVAL FROM EP SERVICE.   Lasix 10mg IV x 1 dose once ambulating, then Lasix 20mg PO daily x 3 days.   Continue Metoprolol.   Start Protonix 40mg twice daily x 2 weeks, then once daily x 6 weeks.   Start Carafate 1gm BID x 2 weeks.   Continue other home medications.   Strict I/Os.  Please encourage incentive spirometry and ambulation once able.  Observation and monitoring on telemetry overnight with anticipated discharge in the AM and outpt follow up in 2-4 weeks.  PROCEDURE(S): Radiofrequency Ablation of Atrial Fibrillation and Supraventricular Tachycardia     ELECTROPHYSIOLOGIST(S): Julito Masterson MD         COMPLICATIONS:  none        DISPOSITION:  observation     CONDITION: stable  EBL: <15mL    Pt doing well s/p atrial fibrillation ablation (WACA/PVI) and a right sided atrial tachycardia ablation via RFV access. CV x 1. Denies complaint.       MEDICATIONS  (STANDING):  furosemide   Injectable 10 milliGRAM(s) IV Push once  hydrochlorothiazide 12.5 milliGRAM(s) Oral daily  lisinopril 10 milliGRAM(s) Oral daily  metoprolol tartrate 50 milliGRAM(s) Oral two times a day  pantoprazole    Tablet 40 milliGRAM(s) Oral two times a day  rivaroxaban 20 milliGRAM(s) Oral with dinner  sucralfate suspension 1 Gram(s) Oral two times a day    MEDICATIONS  (PRN):  acetaminophen     Tablet .. 650 milliGRAM(s) Oral every 6 hours PRN Mild Pain (1 - 3)  albuterol    90 MICROgram(s) HFA Inhaler 2 Puff(s) Inhalation every 6 hours PRN Bronchospasm  ALPRAZolam 0.25 milliGRAM(s) Oral every 6 hours PRN anxiety/ insomnia  aluminum hydroxide/magnesium hydroxide/simethicone Suspension 30 milliLiter(s) Oral every 4 hours PRN Dyspepsia  benzocaine/menthol Lozenge 1 Lozenge Oral every 2 hours PRN Sore Throat  ondansetron  IVPB 4 milliGRAM(s) IV Intermittent every 8 hours PRN Nausea and/or Vomiting  oxycodone    5 mG/acetaminophen 325 mG 1 Tablet(s) Oral every 4 hours PRN Moderate Pain (4 - 6)      Allergies  Beef (Unknown)  Poultry (Unknown)  pork (Unknown)  No Known Drug Allergies      Exam:   HR: 92  BP: 105/56  RR: 16  SpO2: 100% on NC    Gen: VSS, NAD, sleepy but arousable  Card: S1/S2, RRR, no m/g/r  Resp: lungs CTA b/l  Abd: S/NT/ND  Groin(Right): hemostatic suture in place; site C/D/I b/l; no bleeding, hematoma, erythema, exudate or edema  Ext: no edema; distal pulses intact    I/Os: net + 1794    ECG:    Assessment:   53y Female with PMH of asthma, HTN, cerebral meningeoma (s/p removal 2011), palpitations s/p ILR implant and symptomatic SVT/AF. She reports chest tightness, palpitations, lightheadedness and near syncope during episodes. Loop recorder interrogation in the past have demonstrated runs of SVT with concern for AT/AF. She now presents electively for SVT/AF ablation. Preopertive DEANN deferred as pt with low CHADSVASC (2) and pt currently in sinus rhythm. DEYANIRA cleared intraoperatively with ICE catheter. Now status post uncomplicated radiofrequency ablation of atrial fibrillation (WACA/PVI) and a right sided atrial tachycardia.      Plan:   Bedrest x 4 hours, then OOB with assistance and progress as tolerated.   Groin suture to be removed by EP service in AM.   Pending groin status: START 20mg PO Xarelto at 16:30 tonight.   DO NOT HOLD, INTERRUPT OR REVERSE ANTICOAGULATION WITHOUT EXPLICIT APPROVAL FROM EP SERVICE.   Lasix 10mg IV x 1 dose once ambulating, then Lasix 20mg PO daily x 3 days.   Continue Metoprolol.   Start Protonix 40mg twice daily x 2 weeks, then once daily x 6 weeks.   Start Carafate 1gm BID x 2 weeks.   Continue other home medications.   Strict I/Os.  Please encourage incentive spirometry and ambulation once able.  Observation and monitoring on telemetry overnight with anticipated discharge in the AM and outpt follow up in 2-4 weeks.  PROCEDURE(S): Radiofrequency Ablation of Atrial Fibrillation and Supraventricular Tachycardia     ELECTROPHYSIOLOGIST(S): Julito Masterson MD         COMPLICATIONS:  none        DISPOSITION:  observation     CONDITION: stable  EBL: <15mL    Pt doing well s/p atrial fibrillation ablation (WACA/PVI) and a right sided atrial tachycardia ablation via RFV access. CV x 1. Denies complaint.       MEDICATIONS  (STANDING):  furosemide   Injectable 10 milliGRAM(s) IV Push once  hydrochlorothiazide 12.5 milliGRAM(s) Oral daily  lisinopril 10 milliGRAM(s) Oral daily  metoprolol tartrate 50 milliGRAM(s) Oral two times a day  pantoprazole    Tablet 40 milliGRAM(s) Oral two times a day  rivaroxaban 20 milliGRAM(s) Oral with dinner  sucralfate suspension 1 Gram(s) Oral two times a day    MEDICATIONS  (PRN):  acetaminophen     Tablet .. 650 milliGRAM(s) Oral every 6 hours PRN Mild Pain (1 - 3)  albuterol    90 MICROgram(s) HFA Inhaler 2 Puff(s) Inhalation every 6 hours PRN Bronchospasm  ALPRAZolam 0.25 milliGRAM(s) Oral every 6 hours PRN anxiety/ insomnia  aluminum hydroxide/magnesium hydroxide/simethicone Suspension 30 milliLiter(s) Oral every 4 hours PRN Dyspepsia  benzocaine/menthol Lozenge 1 Lozenge Oral every 2 hours PRN Sore Throat  ondansetron  IVPB 4 milliGRAM(s) IV Intermittent every 8 hours PRN Nausea and/or Vomiting  oxycodone    5 mG/acetaminophen 325 mG 1 Tablet(s) Oral every 4 hours PRN Moderate Pain (4 - 6)      Allergies  Beef (Unknown)  Poultry (Unknown)  pork (Unknown)  No Known Drug Allergies      Exam:   HR: 92  BP: 105/56  RR: 16  SpO2: 100% on NC    Gen: VSS, NAD, sleepy but arousable  Card: S1/S2, RRR, no m/g/r  Resp: lungs CTA b/l  Abd: S/NT/ND  Groin(Right): hemostatic suture in place; site C/D/I b/l; no bleeding, hematoma, erythema, exudate or edema  Ext: no edema; distal pulses intact    I/Os: net + 1794    ECG: NSR, RBBB    Assessment:   53y Female with PMH of asthma, HTN, cerebral meningeoma (s/p removal 2011), palpitations s/p ILR implant and symptomatic SVT/AF. She reports chest tightness, palpitations, lightheadedness and near syncope during episodes. Loop recorder interrogation in the past have demonstrated runs of SVT with concern for AT/AF. She now presents electively for SVT/AF ablation. Preopertive DEANN deferred as pt with low CHADSVASC (2) and pt currently in sinus rhythm. DEYANIRA cleared intraoperatively with ICE catheter. Now status post uncomplicated radiofrequency ablation of atrial fibrillation (WACA/PVI) and a right sided atrial tachycardia.      Plan:   Bedrest x 4 hours, then OOB with assistance and progress as tolerated.   Groin suture to be removed by EP service in AM.   Pending groin status: START 20mg PO Xarelto at 16:30 tonight.   DO NOT HOLD, INTERRUPT OR REVERSE ANTICOAGULATION WITHOUT EXPLICIT APPROVAL FROM EP SERVICE.   Lasix 10mg IV x 1 dose once ambulating, then Lasix 20mg PO daily x 3 days.   Continue Metoprolol.   Start Protonix 40mg twice daily x 2 weeks, then once daily x 6 weeks.   Start Carafate 1gm BID x 2 weeks.   Continue other home medications.   Strict I/Os.  Please encourage incentive spirometry and ambulation once able.  Observation and monitoring on telemetry overnight with anticipated discharge in the AM and outpt follow up in 2-4 weeks.

## 2023-04-27 NOTE — PROGRESS NOTE ADULT - SUBJECTIVE AND OBJECTIVE BOX
Pt arrived for scheduled SVT ablation with Dr. Masterson, PST performed on 2023. Labs reviewed. NPO confirmed.     Pt is a 53y Female with PMH of asthma, HTN, cerebral meningeoma (s/p removal ) and symptomatic SVT.     Previous ILR interrogation in  showed 3 very short episodes she had to 1 episodes she will 19 around the same time of 23: 29 lasting 9 seconds and 31 seconds respectively with mean ventricular rate of 180 bpm. After the initial ILR battery , another ILR was placed. She has had recordings that have shown nonsustained SVT with rates varying between 150 to 180 bpm and prior wide-complex tachycardia 4 beats. Her most recent episode was in 2022, she was seen at [facility] due to episode of tachycardia. States when she experiences the tachycardia she has chest tightness, palpitations, lightheadedness and near syncope. She now presents electively for SVT ablation.    Cardiology summary:  Exercise stress test 3/7/23 left ventricle normal in size, normal myocardial perfusion scan, no evidence of infarction or inducible ischemia    Pt arrived for scheduled SVT ablation with Dr. Masterson, PST performed on 4/19/2023. Labs reviewed. NPO confirmed.     Pt is a 53y Female with PMH of asthma, HTN, cerebral meningeoma (s/p removal 2011), palpitations s/p ILR implant and symptomatic SVT. She reports chest tightness, palpitations, lightheadedness and near syncope during episodes. She now presents electively for SVT ablation.    Cardiology summary:  Exercise stress test 3/7/23 left ventricle normal in size, normal myocardial perfusion scan, no evidence of infarction or inducible ischemia    Pt arrived for scheduled SVT/AF ablation with Dr. Masterson, PST performed on 4/19/2023. Labs reviewed. NPO confirmed.     Pt is a 53y Female with PMH of asthma, HTN, cerebral meningeoma (s/p removal 2011), palpitations s/p ILR implant and symptomatic SVT/AF. She reports chest tightness, palpitations, lightheadedness and near syncope during episodes. Loop recorder interrogation in the past have demonstrated runs of SVT with concern for AT/AF. She now presents electively for SVT/AF ablation.    Cardiology summary:  Exercise stress test 3/7/23 left ventricle normal in size, normal myocardial perfusion scan, no evidence of infarction or inducible ischemia    Pt arrived for scheduled SVT/AF ablation with Dr. Masterson, PST performed on 4/19/2023. Labs reviewed. NPO confirmed.     Pt is a 53y Female with PMH of asthma, HTN, cerebral meningeoma (s/p removal 2011), palpitations s/p ILR implant and symptomatic SVT/AF. She reports chest tightness, palpitations, lightheadedness and near syncope during episodes. Loop recorder interrogation in the past have demonstrated runs of SVT with concern for AT/AF. She now presents electively for SVT/AF ablation. Preopertive DEANN will be deferred as pt with low CHADSVASC (2) and pt currently in sinus rhythm. DEYANIRA will be cleared intraoperatively with ICE catheter.     Cardiology summary:  Exercise stress test 3/7/23 left ventricle normal in size, normal myocardial perfusion scan, no evidence of infarction or inducible ischemia

## 2023-04-27 NOTE — DISCHARGE NOTE PROVIDER - HOSPITAL COURSE
53y Female with PMH of asthma, HTN, cerebral meningeoma (s/p removal 2011), palpitations s/p ILR implant and symptomatic SVT/AF. She reports chest tightness, palpitations, lightheadedness and near syncope during episodes. Loop recorder interrogation in the past have demonstrated runs of SVT with concern for AT/AF. She now presents electively for SVT/AF ablation. Preopertive DEANN deferred as pt with low CHADSVASC (2) and pt currently in sinus rhythm. DEYANIRA cleared intraoperatively with ICE catheter. Now POD #1 status post uncomplicated radiofrequency ablation of atrial fibrillation (WACA/PVI) and a right sided atrial tachycardia.      Plan:   Bedrest x 4 hours, then OOB with assistance and progress as tolerated.   Groin suture to be removed by EP service in AM.   Xarelto 20mg daily, without any interruptions.  Lasix 20mg PO daily x 3 days.   Continue Metoprolol.   Protonix 40mg twice daily x 2 weeks, then once daily x 6 weeks.   Carafate 1gm BID x 2 weeks.   Access site care and activity limitations reviewed w/ pt.   Outpt f/up in 2-4 weeks.

## 2023-04-27 NOTE — DISCHARGE NOTE PROVIDER - CARE PROVIDER_API CALL
Julito Masterson (MD)  Cardiac Electrophysiology; Cardiology; Internal Medicine  [address] [place] [id number]  Phone: [phone]  Fax: [phone]  Follow Up Time:

## 2023-04-27 NOTE — DISCHARGE NOTE PROVIDER - NSDCFUSCHEDAPPT_GEN_ALL_CORE_FT
Julito Masterson  [facility]  ELECTROPH [address]  Scheduled Appointment: 05/10/2023    [facility]  CARDIOLOGY [address]  Scheduled Appointment: 05/22/2023

## 2023-04-28 ENCOUNTER — TRANSCRIPTION ENCOUNTER (OUTPATIENT)
Age: 53
End: 2023-04-28

## 2023-04-28 VITALS — HEART RATE: 78 BPM | DIASTOLIC BLOOD PRESSURE: 61 MMHG | RESPIRATION RATE: 20 BRPM | SYSTOLIC BLOOD PRESSURE: 102 MMHG

## 2023-04-28 LAB
ANION GAP SERPL CALC-SCNC: 13 MMOL/L — SIGNIFICANT CHANGE UP (ref 5–17)
BUN SERPL-MCNC: 8.9 MG/DL — SIGNIFICANT CHANGE UP (ref 8–20)
CALCIUM SERPL-MCNC: 8.9 MG/DL — SIGNIFICANT CHANGE UP (ref 8.4–10.5)
CHLORIDE SERPL-SCNC: 105 MMOL/L — SIGNIFICANT CHANGE UP (ref 96–108)
CO2 SERPL-SCNC: 23 MMOL/L — SIGNIFICANT CHANGE UP (ref 22–29)
CREAT SERPL-MCNC: 0.91 MG/DL — SIGNIFICANT CHANGE UP (ref 0.5–1.3)
EGFR: 76 ML/MIN/1.73M2 — SIGNIFICANT CHANGE UP
GLUCOSE SERPL-MCNC: 100 MG/DL — HIGH (ref 70–99)
HCT VFR BLD CALC: 38.4 % — SIGNIFICANT CHANGE UP (ref 34.5–45)
HGB BLD-MCNC: 12.7 G/DL — SIGNIFICANT CHANGE UP (ref 11.5–15.5)
MAGNESIUM SERPL-MCNC: 2 MG/DL — SIGNIFICANT CHANGE UP (ref 1.6–2.6)
MCHC RBC-ENTMCNC: 27.1 PG — SIGNIFICANT CHANGE UP (ref 27–34)
MCHC RBC-ENTMCNC: 33.1 GM/DL — SIGNIFICANT CHANGE UP (ref 32–36)
MCV RBC AUTO: 82.1 FL — SIGNIFICANT CHANGE UP (ref 80–100)
PLATELET # BLD AUTO: 304 K/UL — SIGNIFICANT CHANGE UP (ref 150–400)
POTASSIUM SERPL-MCNC: 4.6 MMOL/L — SIGNIFICANT CHANGE UP (ref 3.5–5.3)
POTASSIUM SERPL-SCNC: 4.6 MMOL/L — SIGNIFICANT CHANGE UP (ref 3.5–5.3)
RBC # BLD: 4.68 M/UL — SIGNIFICANT CHANGE UP (ref 3.8–5.2)
RBC # FLD: 13.8 % — SIGNIFICANT CHANGE UP (ref 10.3–14.5)
SODIUM SERPL-SCNC: 140 MMOL/L — SIGNIFICANT CHANGE UP (ref 135–145)
WBC # BLD: 17.6 K/UL — HIGH (ref 3.8–10.5)
WBC # FLD AUTO: 17.6 K/UL — HIGH (ref 3.8–10.5)

## 2023-04-28 PROCEDURE — 80048 BASIC METABOLIC PNL TOTAL CA: CPT

## 2023-04-28 PROCEDURE — C1894: CPT

## 2023-04-28 PROCEDURE — C1766: CPT

## 2023-04-28 PROCEDURE — C1732: CPT

## 2023-04-28 PROCEDURE — 93005 ELECTROCARDIOGRAM TRACING: CPT

## 2023-04-28 PROCEDURE — C1759: CPT

## 2023-04-28 PROCEDURE — C1731: CPT

## 2023-04-28 PROCEDURE — C9399: CPT

## 2023-04-28 PROCEDURE — 93010 ELECTROCARDIOGRAM REPORT: CPT

## 2023-04-28 PROCEDURE — 93656 COMPRE EP EVAL ABLTJ ATR FIB: CPT

## 2023-04-28 PROCEDURE — 83735 ASSAY OF MAGNESIUM: CPT

## 2023-04-28 PROCEDURE — C1893: CPT

## 2023-04-28 PROCEDURE — C1889: CPT

## 2023-04-28 PROCEDURE — 36415 COLL VENOUS BLD VENIPUNCTURE: CPT

## 2023-04-28 PROCEDURE — 93655 ICAR CATH ABLTJ DSCRT ARRHYT: CPT

## 2023-04-28 PROCEDURE — 85027 COMPLETE CBC AUTOMATED: CPT

## 2023-04-28 PROCEDURE — 92960 CARDIOVERSION ELECTRIC EXT: CPT | Mod: 59

## 2023-04-28 RX ORDER — PANTOPRAZOLE SODIUM 20 MG/1
1 TABLET, DELAYED RELEASE ORAL
Qty: 70 | Refills: 0
Start: 2023-04-28 | End: 2023-05-11

## 2023-04-28 RX ORDER — SUCRALFATE 1 G
10 TABLET ORAL
Qty: 300 | Refills: 0
Start: 2023-04-28 | End: 2023-05-11

## 2023-04-28 RX ORDER — RIVAROXABAN 15 MG-20MG
1 KIT ORAL
Qty: 30 | Refills: 3
Start: 2023-04-28 | End: 2023-08-25

## 2023-04-28 RX ADMIN — Medication 50 MILLIGRAM(S): at 05:36

## 2023-04-28 RX ADMIN — Medication 1 GRAM(S): at 05:36

## 2023-04-28 RX ADMIN — Medication 20 MILLIGRAM(S): at 05:35

## 2023-04-28 RX ADMIN — LISINOPRIL 10 MILLIGRAM(S): 2.5 TABLET ORAL at 05:36

## 2023-04-28 RX ADMIN — PANTOPRAZOLE SODIUM 40 MILLIGRAM(S): 20 TABLET, DELAYED RELEASE ORAL at 05:35
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
## 2023-04-28 NOTE — DISCHARGE NOTE NURSING/CASE MANAGEMENT/SOCIAL WORK - NSPROMEDSBROUGHTTOHOSP_GEN_A_NUR
Health Maintenance Due   Topic Date Due   • Hepatitis B Vaccine (1 of 3 - 3-dose series) Never done   • Pneumococcal Vaccine 0-64 (1 - PCV) Never done   • Colorectal Cancer Screen-  Never done   • Shingles Vaccine (1 of 2) Never done   • COVID-19 Vaccine (4 - Booster for Pfizer series) 03/01/2022   • Influenza Vaccine (1) 09/01/2022   • Breast Cancer Screening  10/06/2022       Patient is due for topics listed above, she wishes to proceed with Mammogram, but is not proceeding with Immunization(s) COVID-19, Hep B, Influenza, Pneumococcal and Shingles at this time. The following has occurred: Education provided for Immunization(s) COVID-19, Hep B, Influenza, Pneumococcal and Shingles.  Orders placed for breast cancer screen and colorectal cancer screen.    Patient has given consent to record this visit for documentation in their clinical record.    Recent PHQ 2/9 Score    PHQ 2:  Date Adult PHQ 2 Score Adult PHQ 2 Interpretation   2/14/2023 0 No further screening needed       PHQ 9:  Date Adult PHQ 9 Score Adult PHQ 9 Interpretation   11/1/2022 2 Minimal Depression      no

## 2023-04-28 NOTE — DISCHARGE NOTE NURSING/CASE MANAGEMENT/SOCIAL WORK - PATIENT PORTAL LINK FT
You can access the FollowMyHealth Patient Portal offered by [facility] by registering at the following website: [email or web address]. By joining [employer]’s FollowMyHealth portal, you will also be able to view your health information using other applications (apps) compatible with our system.

## 2023-04-28 NOTE — DISCHARGE NOTE NURSING/CASE MANAGEMENT/SOCIAL WORK - NSDCPEFALRISK_GEN_ALL_CORE
For information on Fall & Injury Prevention, visit: [email or web address] OR  [email or web address] OR  https://www.cdc.gov/steadi/patient.html

## 2023-04-28 NOTE — DISCHARGE NOTE NURSING/CASE MANAGEMENT/SOCIAL WORK - NSTRANSFERBELONGINGSDISPO_GEN_A_NUR
with patient 70F presenting to the ED with 3 days of severe abdominal pain and obstipation after having a laparoscopic appendectomy on 6/3/22.  She states that the pain never went away after surgery and has been worsening.  This morning she tried taking oxycodone but this did not help so she called Dr. Verde's office who referred her to ED.  CT scan shows large amount of free air, more than expected for postoperative day 3 as well as loculated fluid collection in right pelvis. Patient was taken emergently to OR by Dr. Smith and found to have 2 perforations in terminal ileum. Patient underwent an exploratory laparotomy with ileocecal resection and ileocolic anastomosis on 6/6. Abthera was placed and patient was transferred to SICU. Patient returned to OR on 6/8 for abdominal washout and closure of abdominal wall with mesh. Patient transferred back to SICU.     6/9: Patient kept NPO with NGT due to no bowel function. OR cultures from 6/6 grew yeast and enterobacter. Patient placed on empiric zosyn and diflucan. Nystatin swish & swallow ordered for oral thrush   6/10: Diaz removed and patient passed trial of void. Advanced to sips with NGT in place.   6/11: Patient transferred from SICU to surgical floor.   6/12: Patient started passing gas and had multiple BM. NGT removed and diet advanced to clears. WBC up-trending and CT A/P with PO/IV contrast performed. C. diff collected due to diarrhea and was negative. CT read as 4 cm pelvic colleciton not amenable to drainage, right pleural effusion with collapsed right lower lobe.   6/13: Diet advanced to LRD.   6/15: White blood count still uptrending. ID consulted and switched zosyn to cefepime and flagyl.   6/16: Patient started to feel better after switch in antibiotics. White count downtrending   6/17: White count continues to downtrending   6/18: ID switched antibiotics to PO ciprofloxacin, flagyl, and diflucan.    70F presenting to the ED with 3 days of severe abdominal pain and obstipation after having a laparoscopic appendectomy on 6/3/22.  She states that the pain never went away after surgery and has been worsening.  This morning she tried taking oxycodone but this did not help so she called Dr. Verde's office who referred her to ED.  CT scan shows large amount of free air, more than expected for postoperative day 3 as well as loculated fluid collection in right pelvis. Patient was taken emergently to OR by Dr. Smith and found to have 2 perforations in terminal ileum. Patient underwent an exploratory laparotomy with ileocecal resection and ileocolic anastomosis on 6/6. Abthera was placed and patient was transferred to SICU. Patient returned to OR on 6/8 for abdominal washout and closure of abdominal wall with mesh. Patient transferred back to SICU.     6/9: Patient kept NPO with NGT due to no bowel function. OR cultures from 6/6 grew yeast and enterobacter. Patient placed on empiric zosyn and diflucan. Nystatin swish & swallow ordered for oral thrush   6/10: Diaz removed and patient passed trial of void. Advanced to sips with NGT in place.   6/11: Patient transferred from SICU to surgical floor.   6/12: Patient started passing gas and had multiple BM. NGT removed and diet advanced to clears. WBC up-trending and CT A/P with PO/IV contrast performed. C. diff collected due to diarrhea and was negative. CT read as 4 cm pelvic colleciton not amenable to drainage, right pleural effusion with collapsed right lower lobe.   6/13: Diet advanced to LRD.   6/15: White blood count still uptrending. ID consulted and switched zosyn to cefepime and flagyl.   6/16: Patient started to feel better after switch in antibiotics. White count downtrending   6/17: White count continues to downtrending   6/18: ID switched antibiotics to PO ciprofloxacin, flagyl, and diflucan.   6/19: Patient could not tolerate PO antibiotics and was placed back on IV antibiotics. Midline placed for home IV abx.      Today patient is tolerating diet, having bowel function, voiding spontaneously, ambulating, breathing comfortably on room air and deemed ready for discharge by Dr. Smith    70F presenting to the ED with 3 days of severe abdominal pain and obstipation after having a laparoscopic appendectomy on 6/3/22.  She states that the pain never went away after surgery and has been worsening.  This morning she tried taking oxycodone but this did not help so she called Dr. Verde's office who referred her to ED.  CT scan shows large amount of free air, more than expected for postoperative day 3 as well as loculated fluid collection in right pelvis. Patient was taken emergently to OR by Dr. Smith and found to have 2 perforations in terminal ileum. Patient underwent an exploratory laparotomy with ileocecal resection and ileocolic anastomosis on 6/6. Abthera was placed and patient was transferred to SICU. Patient returned to OR on 6/8 for abdominal washout and closure of abdominal wall with mesh. Patient transferred back to SICU.     6/9: Patient kept NPO with NGT due to no bowel function. OR cultures from 6/6 grew yeast and enterobacter. Patient placed on empiric zosyn and diflucan. Nystatin swish & swallow ordered for oral thrush   6/10: Diaz removed and patient passed trial of void. Advanced to sips with NGT in place.   6/11: Patient transferred from SICU to surgical floor.   6/12: Patient started passing gas and had multiple BM. NGT removed and diet advanced to clears. WBC up-trending and CT A/P with PO/IV contrast performed. C. diff collected due to diarrhea and was negative. CT read as 4 cm pelvic colleciton not amenable to drainage, right pleural effusion with collapsed right lower lobe.   6/13: Diet advanced to LRD.   6/15: White blood count still uptrending. ID consulted and switched zosyn to cefepime and flagyl.   6/16: Patient started to feel better after switch in antibiotics. White count downtrending   6/17: White count continues to downtrending   6/18: ID switched antibiotics to PO ciprofloxacin, flagyl, and diflucan.   6/19: Patient could not tolerate PO antibiotics and was placed back on IV antibiotics. Midline placed for home IV abx.  6/20: pt switched to Invanz abx IV and will be going home with IV abx for 1 week. On day of discharge, the patient's vitals are stable, is tolerating a regular diet, voiding appropriately, ambulating well and pain controlled. She will f/u with Dr. Smith in 1-2 weeks, will f/u with ID in 1 week, will f/u with her PCP in 1-2 weeks.

## 2023-04-29 LAB — SARS-COV-2 N GENE NPH QL NAA+PROBE: NOT DETECTED

## 2023-05-06 ENCOUNTER — EMERGENCY (EMERGENCY)
Facility: HOSPITAL | Age: 53
LOS: 1 days | Discharge: HOME | End: 2023-05-06
Attending: EMERGENCY MEDICINE
Payer: MEDICAID

## 2023-05-06 VITALS
TEMPERATURE: 99 F | HEART RATE: 85 BPM | DIASTOLIC BLOOD PRESSURE: 77 MMHG | OXYGEN SATURATION: 98 % | SYSTOLIC BLOOD PRESSURE: 121 MMHG | RESPIRATION RATE: 18 BRPM

## 2023-05-06 VITALS
OXYGEN SATURATION: 98 % | SYSTOLIC BLOOD PRESSURE: 96 MMHG | DIASTOLIC BLOOD PRESSURE: 54 MMHG | RESPIRATION RATE: 20 BRPM | HEIGHT: 68 IN | TEMPERATURE: 98 F | WEIGHT: 214.95 LBS | HEART RATE: 79 BPM

## 2023-05-06 DIAGNOSIS — Z96.642 PRESENCE OF LEFT ARTIFICIAL HIP JOINT: Chronic | ICD-10-CM

## 2023-05-06 DIAGNOSIS — Z98.891 HISTORY OF UTERINE SCAR FROM PREVIOUS SURGERY: Chronic | ICD-10-CM

## 2023-05-06 DIAGNOSIS — Z96.641 PRESENCE OF RIGHT ARTIFICIAL HIP JOINT: Chronic | ICD-10-CM

## 2023-05-06 DIAGNOSIS — D32.0 BENIGN NEOPLASM OF CEREBRAL MENINGES: Chronic | ICD-10-CM

## 2023-05-06 DIAGNOSIS — Z98.890 OTHER SPECIFIED POSTPROCEDURAL STATES: Chronic | ICD-10-CM

## 2023-05-06 DIAGNOSIS — Z90.710 ACQUIRED ABSENCE OF BOTH CERVIX AND UTERUS: Chronic | ICD-10-CM

## 2023-05-06 LAB
ANION GAP SERPL CALC-SCNC: 9 MMOL/L — SIGNIFICANT CHANGE UP (ref 5–17)
BASOPHILS # BLD AUTO: 0.03 K/UL — SIGNIFICANT CHANGE UP (ref 0–0.2)
BASOPHILS NFR BLD AUTO: 0.6 % — SIGNIFICANT CHANGE UP (ref 0–2)
BUN SERPL-MCNC: 10 MG/DL — SIGNIFICANT CHANGE UP (ref 8–20)
CALCIUM SERPL-MCNC: 8.4 MG/DL — SIGNIFICANT CHANGE UP (ref 8.4–10.5)
CHLORIDE SERPL-SCNC: 104 MMOL/L — SIGNIFICANT CHANGE UP (ref 96–108)
CO2 SERPL-SCNC: 27 MMOL/L — SIGNIFICANT CHANGE UP (ref 22–29)
CREAT SERPL-MCNC: 0.88 MG/DL — SIGNIFICANT CHANGE UP (ref 0.5–1.3)
EGFR: 79 ML/MIN/1.73M2 — SIGNIFICANT CHANGE UP
EOSINOPHIL # BLD AUTO: 0.08 K/UL — SIGNIFICANT CHANGE UP (ref 0–0.5)
EOSINOPHIL NFR BLD AUTO: 1.6 % — SIGNIFICANT CHANGE UP (ref 0–6)
GLUCOSE SERPL-MCNC: 87 MG/DL — SIGNIFICANT CHANGE UP (ref 70–99)
HCT VFR BLD CALC: 34.1 % — LOW (ref 34.5–45)
HGB BLD-MCNC: 11 G/DL — LOW (ref 11.5–15.5)
IMM GRANULOCYTES NFR BLD AUTO: 0.6 % — SIGNIFICANT CHANGE UP (ref 0–0.9)
LYMPHOCYTES # BLD AUTO: 1.12 K/UL — SIGNIFICANT CHANGE UP (ref 1–3.3)
LYMPHOCYTES # BLD AUTO: 22.4 % — SIGNIFICANT CHANGE UP (ref 13–44)
MCHC RBC-ENTMCNC: 26.7 PG — LOW (ref 27–34)
MCHC RBC-ENTMCNC: 32.3 GM/DL — SIGNIFICANT CHANGE UP (ref 32–36)
MCV RBC AUTO: 82.8 FL — SIGNIFICANT CHANGE UP (ref 80–100)
MONOCYTES # BLD AUTO: 0.63 K/UL — SIGNIFICANT CHANGE UP (ref 0–0.9)
MONOCYTES NFR BLD AUTO: 12.6 % — SIGNIFICANT CHANGE UP (ref 2–14)
NEUTROPHILS # BLD AUTO: 3.11 K/UL — SIGNIFICANT CHANGE UP (ref 1.8–7.4)
NEUTROPHILS NFR BLD AUTO: 62.2 % — SIGNIFICANT CHANGE UP (ref 43–77)
PLATELET # BLD AUTO: 262 K/UL — SIGNIFICANT CHANGE UP (ref 150–400)
POTASSIUM SERPL-MCNC: 4 MMOL/L — SIGNIFICANT CHANGE UP (ref 3.5–5.3)
POTASSIUM SERPL-SCNC: 4 MMOL/L — SIGNIFICANT CHANGE UP (ref 3.5–5.3)
RBC # BLD: 4.12 M/UL — SIGNIFICANT CHANGE UP (ref 3.8–5.2)
RBC # FLD: 13.5 % — SIGNIFICANT CHANGE UP (ref 10.3–14.5)
SODIUM SERPL-SCNC: 140 MMOL/L — SIGNIFICANT CHANGE UP (ref 135–145)
TROPONIN T SERPL-MCNC: <0.01 NG/ML — SIGNIFICANT CHANGE UP (ref 0–0.06)
WBC # BLD: 5 K/UL — SIGNIFICANT CHANGE UP (ref 3.8–10.5)
WBC # FLD AUTO: 5 K/UL — SIGNIFICANT CHANGE UP (ref 3.8–10.5)

## 2023-05-06 PROCEDURE — 85025 COMPLETE CBC W/AUTO DIFF WBC: CPT

## 2023-05-06 PROCEDURE — 93010 ELECTROCARDIOGRAM REPORT: CPT

## 2023-05-06 PROCEDURE — 71045 X-RAY EXAM CHEST 1 VIEW: CPT

## 2023-05-06 PROCEDURE — 99285 EMERGENCY DEPT VISIT HI MDM: CPT | Mod: 25

## 2023-05-06 PROCEDURE — 36415 COLL VENOUS BLD VENIPUNCTURE: CPT

## 2023-05-06 PROCEDURE — 93005 ELECTROCARDIOGRAM TRACING: CPT

## 2023-05-06 PROCEDURE — 80048 BASIC METABOLIC PNL TOTAL CA: CPT

## 2023-05-06 PROCEDURE — 71045 X-RAY EXAM CHEST 1 VIEW: CPT | Mod: 26

## 2023-05-06 PROCEDURE — 99284 EMERGENCY DEPT VISIT MOD MDM: CPT

## 2023-05-06 PROCEDURE — 84484 ASSAY OF TROPONIN QUANT: CPT

## 2023-05-06 NOTE — ED PROVIDER NOTE - PHYSICAL EXAMINATION
Gen: Well appearing in NAD  Head: NC/AT  Neck: trachea midline  Card: regular rate and rhythm  Resp:  CTAB  Abd: soft, non-distended, non-tender  Ext: no deformities above reported baseline  Neuro:  A&O, no motor or sensory deficits above reported baseline  Skin:  Warm and dry as visualized  Psych:  Normal affect and mood

## 2023-05-06 NOTE — ED PROVIDER NOTE - OBJECTIVE STATEMENT
53-year-old female patient with past medical history of SVT status post ablation April 28 of this year was sent in by her electrophysiologist for episode of palpitations today.  Patient reports that she had sudden onset of labile heartbeat.  She was ranging from the 140s to the 80s.  Palpitations were accompanied by chest pain and shortness of breath.  She denies nausea, vomiting, diarrhea, fever, chills, cough, back pain, numbness, tingling, focal weakness, or any other complaints.

## 2023-05-06 NOTE — ED PROVIDER NOTE - PATIENT PORTAL LINK FT
You can access the FollowMyHealth Patient Portal offered by [facility] by registering at the following website: [email or web address]. By joining [employer]’s FollowMyHealth portal, you will also be able to view your health information using other applications (apps) compatible with our system.

## 2023-05-06 NOTE — ED ADULT NURSE NOTE - OBJECTIVE STATEMENT
Pt in no apparent distress at this time. Airway patent, breathing spontaneous and nonlabored. Pt A&Ox3 resting in stretcher. Pt c/o       , palpitations, lightheaded x 1 hour, resolving just PTA. cardiac ablation 1 week ago. pt on monitor

## 2023-05-06 NOTE — ED ADULT TRIAGE NOTE - LOCATION:
Render In Strict Bullet Format?: No Initiate Treatment: Cetaphil cleanser or mild cleanser. Detail Level: Simple Discontinue Regimen: Clotrimazole and Betamethasone cream Left arm;

## 2023-05-06 NOTE — ED ADULT NURSE NOTE - CHIEF COMPLAINT QUOTE
Patient presents to ER C/O palpitations with dizziness and chest pain, reports episode lasted about 40 minutes, cardiac ablation last week, currently denies CP, mild dizziness, resp even/unlabored. Oriented - self; Oriented - place; Oriented - time

## 2023-05-06 NOTE — ED PROVIDER NOTE - CLINICAL SUMMARY MEDICAL DECISION MAKING FREE TEXT BOX
54 y/o F patient with past medical history of SVT was sent in by her electrophysiologist for episode of palpitations, chest pressure, shortness of breath today.  EKG normal sinus rhythm in the ED.  Labs, chest x-ray ordered.  Electrophysiology consulted. 54 y/o F patient with past medical history of SVT was sent in by her electrophysiologist for episode of palpitations, chest pressure, shortness of breath today.  EKG normal sinus rhythm in the ED.  Consider recurrent SVT versus ACS. Labs, chest x-ray ordered. 52 y/o F patient with past medical history of SVT was sent in by her electrophysiologist for episode of palpitations, chest pressure, shortness of breath today.  EKG normal sinus rhythm in the ED.  Consider recurrent SVT versus ACS. Labs, chest x-ray ordered. Workup is unremarkable for any emergent process.   Patient is now feeling improved and wishes to leave.  Patient / family members have capacity.    Patient/family was given full return precautions, counseled on red flag symptoms such as LOC, fever, severe pain, or focal deficits and advised to return to the ED for these reasons or any reason that was concerning to them. Patient/family was informed of all significant and incidental findings found on this workup today and all results were reviewed.   All questions were answered, advised to make close follow up with their primary care provider and specialty clinics (as applicable) to follow up with this visit and continue investigation/treatment.   Patient/family has shown adequate understanding and is agreeable to the plan.

## 2023-05-06 NOTE — ED ADULT TRIAGE NOTE - CHIEF COMPLAINT QUOTE
Patient presents to ER C/O palpitations with dizziness and chest pain, reports episode lasted about 40 minutes, cardiac ablation last week, currently denies CP, mild dizziness, resp even/unlabored.

## 2023-05-07 PROBLEM — I47.1 SUPRAVENTRICULAR TACHYCARDIA: Chronic | Status: ACTIVE | Noted: 2023-04-19

## 2023-05-10 ENCOUNTER — APPOINTMENT (OUTPATIENT)
Dept: ELECTROPHYSIOLOGY | Facility: CLINIC | Age: 53
End: 2023-05-10
Payer: MEDICAID

## 2023-05-10 VITALS
OXYGEN SATURATION: 100 % | WEIGHT: 215 LBS | HEART RATE: 59 BPM | DIASTOLIC BLOOD PRESSURE: 60 MMHG | BODY MASS INDEX: 32.58 KG/M2 | HEIGHT: 68 IN | SYSTOLIC BLOOD PRESSURE: 102 MMHG

## 2023-05-10 PROCEDURE — 99213 OFFICE O/P EST LOW 20 MIN: CPT | Mod: 25

## 2023-05-10 PROCEDURE — 93000 ELECTROCARDIOGRAM COMPLETE: CPT

## 2023-05-12 ENCOUNTER — NON-APPOINTMENT (OUTPATIENT)
Age: 53
End: 2023-05-12

## 2023-05-14 NOTE — REVIEW OF SYSTEMS
[Palpitations] : palpitations [Under Stress] : under stress [Feeling Fatigued] : not feeling fatigued [Blurry Vision] : no blurred vision [Sore Throat] : no sore throat [Dyspnea on exertion] : not dyspnea during exertion [Cough] : no cough [Abdominal Pain] : no abdominal pain [Easy Bleeding] : no tendency for easy bleeding

## 2023-05-14 NOTE — PHYSICAL EXAM
[No Acute Distress] : no acute distress [Normal Venous Pressure] : normal venous pressure [Normal S1, S2] : normal S1, S2 [Non Tender] : non-tender [No Edema] : no edema [General Appearance - In No Acute Distress] : no acute distress [Normal Conjunctiva] : the conjunctiva exhibited no abnormalities [Normal Jugular Venous V Waves Present] : normal jugular venous V waves present [Respiration, Rhythm And Depth] : normal respiratory rhythm and effort [Auscultation Breath Sounds / Voice Sounds] : lungs were clear to auscultation bilaterally [Heart Rate And Rhythm] : heart rate and rhythm were normal [Heart Sounds] : normal S1 and S2 [Murmurs] : no murmurs present [Arterial Pulses Normal] : the arterial pulses were normal [Edema] : no peripheral edema present [Abdomen Tenderness] : non-tender [Abnormal Walk] : normal gait [Nail Clubbing] : no clubbing of the fingernails [Cyanosis, Localized] : no localized cyanosis [] : no rash [Impaired Insight] : insight and judgment were intact [de-identified] : Wound site ILR: Healing well

## 2023-05-14 NOTE — HISTORY OF PRESENT ILLNESS
[FreeTextEntry1] : Patient was hospitalized at [facility] December 2022 with episode of tachycardia.\par \par She has been doing well recently but has short episodes of fluttering.\par \par She is currently on aspirin and metoprolol 50 mg twice daily.\par \par Her implantable loop monitor was interrogated and she has not had any episodes since hospital discharge.  She does have short fluttering episodes which is not recorded on the monitor.\par The patient had a nuclear stress test performed at [facility] on March 7/2023.  She exercised to 95% of her MPHR with 9 minutes had no symptoms and nondiagnostic EKG.  She had a normal stress test.\par \par status post recent ILR replacement.  She has a prior history of SVT/palpitation.  \par The ILR was interrogated today and there were no arrhythmias since December 2022.  \par \par She has no chest pain, shortness of breath, dizziness, lightheadedness, syncope or presyncope.\par Previous ILR interrogation: 2021: Patient had 3 very short episodes she had to 1 episodes she will 19 around the same time of 23: 29 lasting 9 seconds and 31 seconds respectively with mean ventricular rate of 180 bpm.  She had another similar episode on April 8 at 1: 48 less than 20 seconds with a similar heart rate of 180 bpm.  These episodes appear to be in SVT/atrial tachycardia.  \par She had prior left hip replacements last year and recently had a right hip replacement.\par She has an implantable loop monitor and previous recordings had shown nonsustained SVT with rates varying between 150 to 180 bpm and prior wide-complex tachycardia 4 beats.  \par \par The patient has been on metoprolol 25 mg a day time she takes it twice daily.  She has been under recent increased stress.\par \par History of asthma - not requiring treatment\par No HTN or DM\par ? Thyroid - hypo\par No sleep issues\par She is on metoprolol 25 mg qd .\par Evaluation:\par Exercise stress test 2/6/18: 10 METS, no ST abnormalities, no arrhythmia seen. 98% MPHR\par Echo done - verbal report : nl\par \par  na

## 2023-05-14 NOTE — DISCUSSION/SUMMARY
[FreeTextEntry1] : I reviewed the strips from her tachycardia episode in December 9.  It showed an SVT at about 200 bpm appeared to be an atrial tachycardia she is currently on metoprolol.  The options in her she would benefit from catheter ablation.  During this tachycardia she did had some jaw pain and her stress test was normal.  If she has recurrent episodes and does not want to do the ablation I will switch her to a calcium channel blocker from metoprolol.  We will see how she does over the next few months and make a decision regarding ablation.  The patient is doing well on metoprolol therapy.  We will continue to monitor her ILR.  Her blood pressure is low normal range.  I encouraged her to increase her fluid intake.  She does not have any symptoms of dizziness or lightheadedness\par \par Recommend follow-up echocardiogram.\par \par Follow-up remote monitoring\par \par Follow-up with primary care as well as cardiology.   [EKG obtained to assist in diagnosis and management of assessed problem(s)] : EKG obtained to assist in diagnosis and management of assessed problem(s)

## 2023-05-17 ENCOUNTER — NON-APPOINTMENT (OUTPATIENT)
Age: 53
End: 2023-05-17

## 2023-05-22 ENCOUNTER — APPOINTMENT (OUTPATIENT)
Dept: CARDIOLOGY | Facility: CLINIC | Age: 53
End: 2023-05-22
Payer: MEDICAID

## 2023-05-22 ENCOUNTER — NON-APPOINTMENT (OUTPATIENT)
Age: 53
End: 2023-05-22

## 2023-05-22 PROCEDURE — 93298 REM INTERROG DEV EVAL SCRMS: CPT

## 2023-05-22 PROCEDURE — G2066: CPT

## 2023-06-21 ENCOUNTER — NON-APPOINTMENT (OUTPATIENT)
Age: 53
End: 2023-06-21

## 2023-06-26 ENCOUNTER — APPOINTMENT (OUTPATIENT)
Dept: CARDIOLOGY | Facility: CLINIC | Age: 53
End: 2023-06-26
Payer: MEDICAID

## 2023-06-26 ENCOUNTER — NON-APPOINTMENT (OUTPATIENT)
Age: 53
End: 2023-06-26

## 2023-06-26 PROCEDURE — G2066: CPT

## 2023-06-26 PROCEDURE — 93298 REM INTERROG DEV EVAL SCRMS: CPT

## 2023-06-27 RX ORDER — RIVAROXABAN 20 MG/1
20 TABLET, FILM COATED ORAL
Qty: 90 | Refills: 1 | Status: ACTIVE | COMMUNITY
Start: 2023-06-27 | End: 1900-01-01

## 2023-08-09 ENCOUNTER — APPOINTMENT (OUTPATIENT)
Dept: ELECTROPHYSIOLOGY | Facility: CLINIC | Age: 53
End: 2023-08-09
Payer: MEDICAID

## 2023-08-09 VITALS
SYSTOLIC BLOOD PRESSURE: 94 MMHG | HEIGHT: 68 IN | DIASTOLIC BLOOD PRESSURE: 60 MMHG | HEART RATE: 60 BPM | WEIGHT: 205 LBS | BODY MASS INDEX: 31.07 KG/M2 | OXYGEN SATURATION: 100 %

## 2023-08-09 PROCEDURE — 99213 OFFICE O/P EST LOW 20 MIN: CPT

## 2023-08-09 RX ORDER — ASPIRIN ENTERIC COATED TABLETS 81 MG 81 MG/1
81 TABLET, DELAYED RELEASE ORAL DAILY
Qty: 90 | Refills: 2 | Status: DISCONTINUED | COMMUNITY
Start: 2021-07-07 | End: 2023-08-09

## 2023-08-27 NOTE — PHYSICAL EXAM
[No Acute Distress] : no acute distress [Normal Venous Pressure] : normal venous pressure [Normal S1, S2] : normal S1, S2 [Non Tender] : non-tender [No Edema] : no edema [General Appearance - In No Acute Distress] : no acute distress [Normal Conjunctiva] : the conjunctiva exhibited no abnormalities [Normal Jugular Venous V Waves Present] : normal jugular venous V waves present [Respiration, Rhythm And Depth] : normal respiratory rhythm and effort [Auscultation Breath Sounds / Voice Sounds] : lungs were clear to auscultation bilaterally [Heart Rate And Rhythm] : heart rate and rhythm were normal [Heart Sounds] : normal S1 and S2 [Murmurs] : no murmurs present [Arterial Pulses Normal] : the arterial pulses were normal [Edema] : no peripheral edema present [Abdomen Tenderness] : non-tender [Abnormal Walk] : normal gait [Nail Clubbing] : no clubbing of the fingernails [Cyanosis, Localized] : no localized cyanosis [] : no rash [Impaired Insight] : insight and judgment were intact [de-identified] : Wound site ILR: Healing well

## 2023-08-27 NOTE — REVIEW OF SYSTEMS
[Palpitations] : palpitations [Under Stress] : under stress [Blurry Vision] : no blurred vision [Feeling Fatigued] : not feeling fatigued [Sore Throat] : no sore throat [Dyspnea on exertion] : not dyspnea during exertion [Cough] : no cough [Abdominal Pain] : no abdominal pain [Easy Bleeding] : no tendency for easy bleeding

## 2023-08-27 NOTE — HISTORY OF PRESENT ILLNESS
[FreeTextEntry1] : Patient was hospitalized at [facility] December 2022 with episode of tachycardia.\par  \par  She has been doing well recently but has short episodes of fluttering.\par  \par  She is currently on aspirin and metoprolol 50 mg twice daily.\par  \par  Her implantable loop monitor was interrogated and she has not had any episodes since hospital discharge.  She does have short fluttering episodes which is not recorded on the monitor.\par  The patient had a nuclear stress test performed at [facility] on March 7/2023.  She exercised to 95% of her MPHR with 9 minutes had no symptoms and nondiagnostic EKG.  She had a normal stress test.\par  \par  status post recent ILR replacement.  She has a prior history of SVT/palpitation.  \par  The ILR was interrogated today and there were no arrhythmias since December 2022.  \par  \par  She has no chest pain, shortness of breath, dizziness, lightheadedness, syncope or presyncope.\par  Previous ILR interrogation: 2021: Patient had 3 very short episodes she had to 1 episodes she will 19 around the same time of 23: 29 lasting 9 seconds and 31 seconds respectively with mean ventricular rate of 180 bpm.  She had another similar episode on April 8 at 1: 48 less than 20 seconds with a similar heart rate of 180 bpm.  These episodes appear to be in SVT/atrial tachycardia.  \par  She had prior left hip replacements last year and recently had a right hip replacement.\par  She has an implantable loop monitor and previous recordings had shown nonsustained SVT with rates varying between 150 to 180 bpm and prior wide-complex tachycardia 4 beats.  \par  \par  The patient has been on metoprolol 25 mg a day time she takes it twice daily.  She has been under recent increased stress.\par  \par  History of asthma - not requiring treatment\par  No HTN or DM\par  ? Thyroid - hypo\par  No sleep issues\par  She is on metoprolol 25 mg qd .\par  Evaluation:\par  Exercise stress test 2/6/18: 10 METS, no ST abnormalities, no arrhythmia seen. 98% MPHR\par  Echo done - verbal report : nl\par  \par

## 2023-09-12 ENCOUNTER — NON-APPOINTMENT (OUTPATIENT)
Age: 53
End: 2023-09-12

## 2023-09-12 ENCOUNTER — APPOINTMENT (OUTPATIENT)
Dept: CARDIOLOGY | Facility: CLINIC | Age: 53
End: 2023-09-12
Payer: MEDICAID

## 2023-09-12 PROCEDURE — G2066: CPT | Mod: NC

## 2023-09-12 PROCEDURE — 93298 REM INTERROG DEV EVAL SCRMS: CPT

## 2023-09-20 ENCOUNTER — APPOINTMENT (OUTPATIENT)
Dept: ELECTROPHYSIOLOGY | Facility: CLINIC | Age: 53
End: 2023-09-20
Payer: MEDICAID

## 2023-09-20 VITALS
OXYGEN SATURATION: 99 % | SYSTOLIC BLOOD PRESSURE: 124 MMHG | HEART RATE: 57 BPM | HEIGHT: 68 IN | BODY MASS INDEX: 31.52 KG/M2 | DIASTOLIC BLOOD PRESSURE: 78 MMHG | WEIGHT: 208 LBS

## 2023-09-20 DIAGNOSIS — Z98.890 OTHER SPECIFIED POSTPROCEDURAL STATES: ICD-10-CM

## 2023-09-20 DIAGNOSIS — Z86.79 OTHER SPECIFIED POSTPROCEDURAL STATES: ICD-10-CM

## 2023-09-20 DIAGNOSIS — J45.909 UNSPECIFIED ASTHMA, UNCOMPLICATED: ICD-10-CM

## 2023-09-20 PROCEDURE — 99214 OFFICE O/P EST MOD 30 MIN: CPT

## 2023-09-26 ENCOUNTER — OUTPATIENT (OUTPATIENT)
Dept: OUTPATIENT SERVICES | Facility: HOSPITAL | Age: 53
LOS: 1 days | End: 2023-09-26
Payer: MEDICAID

## 2023-09-26 DIAGNOSIS — Z98.890 OTHER SPECIFIED POSTPROCEDURAL STATES: Chronic | ICD-10-CM

## 2023-09-26 DIAGNOSIS — Z98.891 HISTORY OF UTERINE SCAR FROM PREVIOUS SURGERY: Chronic | ICD-10-CM

## 2023-09-26 DIAGNOSIS — Z90.710 ACQUIRED ABSENCE OF BOTH CERVIX AND UTERUS: Chronic | ICD-10-CM

## 2023-09-26 DIAGNOSIS — Z96.641 PRESENCE OF RIGHT ARTIFICIAL HIP JOINT: Chronic | ICD-10-CM

## 2023-09-26 DIAGNOSIS — Z96.642 PRESENCE OF LEFT ARTIFICIAL HIP JOINT: Chronic | ICD-10-CM

## 2023-09-26 DIAGNOSIS — D32.0 BENIGN NEOPLASM OF CEREBRAL MENINGES: Chronic | ICD-10-CM

## 2023-09-26 DIAGNOSIS — M79.89 OTHER SPECIFIED SOFT TISSUE DISORDERS: ICD-10-CM

## 2023-09-26 PROCEDURE — 93970 EXTREMITY STUDY: CPT | Mod: 26

## 2023-09-26 PROCEDURE — 93970 EXTREMITY STUDY: CPT

## 2023-10-04 ENCOUNTER — APPOINTMENT (OUTPATIENT)
Dept: ELECTROPHYSIOLOGY | Facility: CLINIC | Age: 53
End: 2023-10-04

## 2023-10-11 ENCOUNTER — NON-APPOINTMENT (OUTPATIENT)
Age: 53
End: 2023-10-11

## 2023-10-11 ENCOUNTER — APPOINTMENT (OUTPATIENT)
Dept: NEUROLOGY | Facility: CLINIC | Age: 53
End: 2023-10-11

## 2023-10-12 ENCOUNTER — APPOINTMENT (OUTPATIENT)
Dept: VASCULAR SURGERY | Facility: CLINIC | Age: 53
End: 2023-10-12

## 2023-10-17 ENCOUNTER — APPOINTMENT (OUTPATIENT)
Dept: CARDIOLOGY | Facility: CLINIC | Age: 53
End: 2023-10-17

## 2023-10-18 ENCOUNTER — NON-APPOINTMENT (OUTPATIENT)
Age: 53
End: 2023-10-18

## 2023-10-18 ENCOUNTER — APPOINTMENT (OUTPATIENT)
Dept: CARDIOLOGY | Facility: CLINIC | Age: 53
End: 2023-10-18
Payer: MEDICAID

## 2023-10-18 VITALS
WEIGHT: 200 LBS | BODY MASS INDEX: 30.31 KG/M2 | DIASTOLIC BLOOD PRESSURE: 88 MMHG | SYSTOLIC BLOOD PRESSURE: 128 MMHG | HEIGHT: 68 IN | OXYGEN SATURATION: 100 % | HEART RATE: 62 BPM

## 2023-10-18 PROCEDURE — 99204 OFFICE O/P NEW MOD 45 MIN: CPT

## 2023-10-18 RX ORDER — FOLIC ACID 1 MG/1
1 TABLET ORAL DAILY
Refills: 0 | Status: ACTIVE | COMMUNITY

## 2023-10-18 RX ORDER — FUROSEMIDE 40 MG/1
40 TABLET ORAL DAILY
Refills: 0 | Status: ACTIVE | COMMUNITY

## 2023-10-18 RX ORDER — METOPROLOL TARTRATE 25 MG/1
25 TABLET, FILM COATED ORAL TWICE DAILY
Refills: 0 | Status: ACTIVE | COMMUNITY

## 2023-10-18 RX ORDER — METOPROLOL TARTRATE 50 MG/1
50 TABLET, FILM COATED ORAL TWICE DAILY
Qty: 180 | Refills: 3 | Status: DISCONTINUED | COMMUNITY
End: 2023-10-18

## 2023-11-03 ENCOUNTER — APPOINTMENT (OUTPATIENT)
Dept: ELECTROPHYSIOLOGY | Facility: CLINIC | Age: 53
End: 2023-11-03
Payer: MEDICAID

## 2023-11-03 DIAGNOSIS — Z98.890 OTHER SPECIFIED POSTPROCEDURAL STATES: ICD-10-CM

## 2023-11-03 DIAGNOSIS — R00.2 PALPITATIONS: ICD-10-CM

## 2023-11-03 PROCEDURE — 99202 OFFICE O/P NEW SF 15 MIN: CPT | Mod: 95

## 2023-11-03 PROCEDURE — 99212 OFFICE O/P EST SF 10 MIN: CPT | Mod: 95

## 2023-12-08 ENCOUNTER — APPOINTMENT (OUTPATIENT)
Dept: CARDIOLOGY | Facility: CLINIC | Age: 53
End: 2023-12-08
Payer: MEDICAID

## 2023-12-08 ENCOUNTER — NON-APPOINTMENT (OUTPATIENT)
Age: 53
End: 2023-12-08

## 2023-12-09 PROCEDURE — 93298 REM INTERROG DEV EVAL SCRMS: CPT

## 2023-12-09 PROCEDURE — G2066: CPT | Mod: NC

## 2024-01-12 ENCOUNTER — APPOINTMENT (OUTPATIENT)
Dept: CARDIOLOGY | Facility: CLINIC | Age: 54
End: 2024-01-12
Payer: MEDICAID

## 2024-01-12 ENCOUNTER — NON-APPOINTMENT (OUTPATIENT)
Age: 54
End: 2024-01-12

## 2024-01-12 PROCEDURE — 93298 REM INTERROG DEV EVAL SCRMS: CPT

## 2024-02-02 NOTE — ASSESSMENT
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  will start compression stocking 20-30 mm Hg of bilateral LE below the knee/distal thigh  elevate legs and exercise  will consider ablation of Left GSV if persistent symptoms after trial of compression stockings.

## 2024-02-02 NOTE — HISTORY OF PRESENT ILLNESS
[FreeTextEntry1] : 54 y/o female with PMHx of SVT/AFib s/p ablation , HTN , here for evaluation of bilateral LE edema and evidence of severe reflux in the left GSV in the distal thigh , knee and calf and moderate reflux in the left calf   It all started after having the ablation in 4/2023 . Swelling is worse when the patient dangles her legs and better in the morning  She has associated tingling sensation in the legs  She is currently on Xarelto for history of Afib.  No history of DVT.

## 2024-02-11 NOTE — H&P PST ADULT - NSCAFFEAMTFREQ_GEN_ALL_CORE_SD
73-year-old female with history of CAD, CHF, hypertension, hyperlipidemia, atrial fibrillation, VT s/p ICD, on amiodarone, who presents with defibrillator firing. Patient with recent history of supratherapeutic INR C/B GI bleed during recent admission, and had monomorphic VT s/p ICD shock, with planned future VT ablation by Dr. Hurt, discharged 2/2/2024 on Eliquis; presenting after three episodes of VT at home found to be hypokalemic in the ED admitted for likely ablation.  Cleared for therapy by SHANNON Chandler. occasional use

## 2024-02-14 ENCOUNTER — APPOINTMENT (OUTPATIENT)
Dept: CARDIOLOGY | Facility: CLINIC | Age: 54
End: 2024-02-14

## 2024-02-16 ENCOUNTER — NON-APPOINTMENT (OUTPATIENT)
Age: 54
End: 2024-02-16

## 2024-02-16 ENCOUNTER — APPOINTMENT (OUTPATIENT)
Dept: CARDIOLOGY | Facility: CLINIC | Age: 54
End: 2024-02-16
Payer: COMMERCIAL

## 2024-02-16 PROCEDURE — 93298 REM INTERROG DEV EVAL SCRMS: CPT

## 2024-03-22 ENCOUNTER — APPOINTMENT (OUTPATIENT)
Dept: CARDIOLOGY | Facility: CLINIC | Age: 54
End: 2024-03-22
Payer: COMMERCIAL

## 2024-03-22 ENCOUNTER — NON-APPOINTMENT (OUTPATIENT)
Age: 54
End: 2024-03-22

## 2024-03-22 PROCEDURE — 93298 REM INTERROG DEV EVAL SCRMS: CPT

## 2024-04-26 ENCOUNTER — NON-APPOINTMENT (OUTPATIENT)
Age: 54
End: 2024-04-26

## 2024-04-26 ENCOUNTER — APPOINTMENT (OUTPATIENT)
Dept: CARDIOLOGY | Facility: CLINIC | Age: 54
End: 2024-04-26
Payer: COMMERCIAL

## 2024-04-26 PROCEDURE — 93298 REM INTERROG DEV EVAL SCRMS: CPT

## 2024-05-31 ENCOUNTER — NON-APPOINTMENT (OUTPATIENT)
Age: 54
End: 2024-05-31

## 2024-05-31 ENCOUNTER — APPOINTMENT (OUTPATIENT)
Dept: CARDIOLOGY | Facility: CLINIC | Age: 54
End: 2024-05-31
Payer: COMMERCIAL

## 2024-05-31 PROCEDURE — 93298 REM INTERROG DEV EVAL SCRMS: CPT

## 2024-07-05 ENCOUNTER — APPOINTMENT (OUTPATIENT)
Dept: CARDIOLOGY | Facility: CLINIC | Age: 54
End: 2024-07-05

## 2024-11-15 ENCOUNTER — NON-APPOINTMENT (OUTPATIENT)
Age: 54
End: 2024-11-15

## 2024-11-15 ENCOUNTER — APPOINTMENT (OUTPATIENT)
Dept: ELECTROPHYSIOLOGY | Facility: CLINIC | Age: 54
End: 2024-11-15
Payer: MEDICAID

## 2024-11-15 VITALS
DIASTOLIC BLOOD PRESSURE: 66 MMHG | HEIGHT: 68 IN | BODY MASS INDEX: 28.49 KG/M2 | HEART RATE: 61 BPM | WEIGHT: 188 LBS | SYSTOLIC BLOOD PRESSURE: 114 MMHG | OXYGEN SATURATION: 97 %

## 2024-11-15 DIAGNOSIS — Z98.890 OTHER SPECIFIED POSTPROCEDURAL STATES: ICD-10-CM

## 2024-11-15 DIAGNOSIS — R00.2 PALPITATIONS: ICD-10-CM

## 2024-11-15 PROCEDURE — 99213 OFFICE O/P EST LOW 20 MIN: CPT

## 2024-11-22 RX ORDER — METOPROLOL SUCCINATE 50 MG/1
50 TABLET, EXTENDED RELEASE ORAL
Qty: 60 | Refills: 1 | Status: ACTIVE | COMMUNITY
Start: 1900-01-01 | End: 1900-01-01

## 2025-01-23 ENCOUNTER — APPOINTMENT (OUTPATIENT)
Dept: ORTHOPEDIC SURGERY | Facility: CLINIC | Age: 55
End: 2025-01-23
Payer: MEDICAID

## 2025-01-23 VITALS — BODY MASS INDEX: 28.49 KG/M2 | HEIGHT: 68 IN | WEIGHT: 188 LBS

## 2025-01-23 DIAGNOSIS — Z96.641 PRESENCE OF RIGHT ARTIFICIAL HIP JOINT: ICD-10-CM

## 2025-01-23 DIAGNOSIS — Z96.642 PRESENCE OF LEFT ARTIFICIAL HIP JOINT: ICD-10-CM

## 2025-01-23 PROCEDURE — 99203 OFFICE O/P NEW LOW 30 MIN: CPT

## 2025-01-23 PROCEDURE — G2211 COMPLEX E/M VISIT ADD ON: CPT | Mod: NC

## 2025-01-23 PROCEDURE — 73502 X-RAY EXAM HIP UNI 2-3 VIEWS: CPT

## 2025-02-01 ENCOUNTER — RX RENEWAL (OUTPATIENT)
Age: 55
End: 2025-02-01

## 2025-03-28 NOTE — DISCHARGE NOTE PROVIDER - YES NO FOR MLM POSITIVE OR NEGATIVE COVID RESULT
MD bedside. SVE 8/90/0. Coping well through contractions using breathing and comb techniques. Able to rest between contractions. SO supportive and engaged at bedside.    ,

## 2025-04-04 NOTE — ED ADULT NURSE NOTE - ISOLATION TYPE:
Health Maintenance       Shingles Vaccine (1 of 2)  Never done    Respiratory Syncytial Virus (RSV) Vaccine 60+ (1 - Risk 60-74 years 1-dose series)  Never done    COVID-19 Vaccine (5 - 2024-25 season)  Overdue since 9/1/2024    Breast Cancer Screening (Every 2 Years)  Order placed this encounter           Following review of the above:  Patient is not proceeding with: COVID-19, Respiratory Syncytial Virus (RSV), and Shingles    Note: Refer to final orders and clinician documentation.         None